# Patient Record
Sex: FEMALE | Race: WHITE | HISPANIC OR LATINO | Employment: FULL TIME | URBAN - METROPOLITAN AREA
[De-identification: names, ages, dates, MRNs, and addresses within clinical notes are randomized per-mention and may not be internally consistent; named-entity substitution may affect disease eponyms.]

---

## 2017-06-08 ENCOUNTER — OFFICE VISIT (OUTPATIENT)
Dept: FAMILY MEDICINE | Facility: CLINIC | Age: 27
End: 2017-06-08
Payer: COMMERCIAL

## 2017-06-08 VITALS
HEART RATE: 106 BPM | WEIGHT: 191 LBS | DIASTOLIC BLOOD PRESSURE: 78 MMHG | SYSTOLIC BLOOD PRESSURE: 116 MMHG | HEIGHT: 64 IN | OXYGEN SATURATION: 98 % | BODY MASS INDEX: 32.61 KG/M2

## 2017-06-08 DIAGNOSIS — F41.8 MIXED ANXIETY DEPRESSIVE DISORDER: ICD-10-CM

## 2017-06-08 DIAGNOSIS — Z00.00 PHYSICAL EXAM, ANNUAL: Primary | ICD-10-CM

## 2017-06-08 PROCEDURE — 99385 PREV VISIT NEW AGE 18-39: CPT | Mod: ,,, | Performed by: FAMILY MEDICINE

## 2017-06-08 RX ORDER — AMITRIPTYLINE HYDROCHLORIDE 10 MG/1
1 TABLET, FILM COATED ORAL NIGHTLY
COMMUNITY
Start: 2017-05-16 | End: 2018-08-29 | Stop reason: DRUGHIGH

## 2017-06-08 NOTE — PROGRESS NOTES
SUBJECTIVE:   HPI: Alea Barrett  is a 27 y.o. female who presents for annual physical .   Establish Care    HPI  Review of Systems   Constitutional: Negative for activity change, appetite change, chills and diaphoresis.   HENT: Negative for congestion, ear discharge and hearing loss.    Eyes: Negative for pain, discharge and itching.   Respiratory: Negative for apnea, cough, chest tightness and shortness of breath.    Cardiovascular: Negative for chest pain, palpitations and leg swelling.   Endocrine: Negative for cold intolerance, heat intolerance and polydipsia.   Genitourinary: Negative for dysuria.   Musculoskeletal: Negative for arthralgias, gait problem and joint swelling.   Skin: Negative for color change.   Neurological: Negative for dizziness and headaches.   Psychiatric/Behavioral: Negative for agitation.        (Not in a hospital admission)  Review of patient's allergies indicates:   Allergen Reactions    No known drug allergies      Past Medical History:   Diagnosis Date    Anxiety     Depression      Past Surgical History:   Procedure Laterality Date    ear tube      bilateral     Family History   Problem Relation Age of Onset    Hypertension Mother     Arthritis Mother     Hypertension Father     Arthritis Father     Hypertension Maternal Grandmother     Arthritis Maternal Grandmother     Hypertension Paternal Grandmother     Diabetes Paternal Grandmother     Arthritis Paternal Grandmother     Hyperlipidemia Paternal Grandmother     Heart disease Maternal Grandfather     Hypertension Maternal Grandfather     Cancer Paternal Grandfather      Social History   Substance Use Topics    Smoking status: Never Smoker    Smokeless tobacco: Never Used    Alcohol use No      Health Maintenance Topics with due status: Not Due       Topic Last Completion Date    TETANUS VACCINE 07/20/2009    Eye Exam 05/29/2017    Pap Smear 05/30/2017    Influenza Vaccine Not Due       There is no  immunization history on file for this patient.  OBJECTIVE:      Vitals:    06/08/17 1426   BP: 116/78   Pulse: 106     Physical Exam   Constitutional: She is oriented to person, place, and time. She appears well-developed and well-nourished.   HENT:   Head: Normocephalic and atraumatic.   Right Ear: External ear normal.   Left Ear: External ear normal.   Nose: Nose normal.   Mouth/Throat: Oropharynx is clear and moist.   Eyes: EOM are normal. Pupils are equal, round, and reactive to light. Right eye exhibits no discharge. Left eye exhibits no discharge.   Neck: Normal range of motion. Neck supple.   Cardiovascular: Normal rate, regular rhythm, normal heart sounds and intact distal pulses.    No murmur heard.  Pulmonary/Chest: Effort normal and breath sounds normal. No respiratory distress. She has no wheezes.   Abdominal: Soft. Bowel sounds are normal. She exhibits no distension. There is no tenderness. There is no rebound and no guarding.   Musculoskeletal: Normal range of motion. She exhibits no edema, tenderness or deformity.   Lymphadenopathy:     She has no cervical adenopathy.   Neurological: She is alert and oriented to person, place, and time. No cranial nerve deficit. Coordination normal.   Skin: Skin is warm and dry. No rash noted. No erythema.   Psychiatric: She has a normal mood and affect.      Assessment:       1. Physical exam, annual    2. Mixed anxiety depressive disorder        Plan:       Physical exam, annual  -     CBC auto differential; Future; Expected date: 06/08/2017  -     Comprehensive metabolic panel; Future; Expected date: 06/08/2017  -     Lipid panel; Future; Expected date: 06/08/2017  -     TSH; Future    Mixed anxiety depressive disorder  Comments:  on TCA since 2016  Orders:  -     CBC auto differential; Future; Expected date: 06/08/2017  -     Comprehensive metabolic panel; Future; Expected date: 06/08/2017  -     Lipid panel; Future; Expected date: 06/08/2017  -     TSH;  Future     Patient was educated on STDs and preventive measures.  Patient was educated on effects and addictive nature of drugs and alcohol.  Carcinogenic potential of tobacco and tanning beds were discussed.     Counseled on age appropriate medical preventative services, including age appropriate cancer screenings, over all nutritional health, need for a consistent exercise regimen and an over all push towards maintaining a vigorous and active lifestyle.    .xist  Counseled on age appropriate vaccines and discussed upcoming health care needs based on age/gender.  Return in about 3 months (around 9/8/2017) for lab review .      6/8/2017 Tamia Goodrich M.D.

## 2017-06-08 NOTE — PATIENT INSTRUCTIONS
Diet and Lifestyle Tips for Irritable Bowel Syndrome (IBS)    Your healthcare professional may suggest some lifestyle changes to help control your IBS. Changing your diet and managing stress are two of the most important. Follow your healthcare providers instructions and try some of the suggestions below.  Change your diet  Your diet may be an important cause of IBS symptoms. You may want to try the following:  · Pay attention to what foods bother you, and avoid them. For example, dairy products are hard for some people to digest.  · Drink 6 to 8 glasses of water a day.  · Avoid caffeine and tobacco. These are muscle stimulants and can affect the working of your digestive tract.  · Avoid alcohol, which can irritate your digestive tract and make your symptoms worse.  · Eat more fiber if constipation is a problem. Fiber makes the stool softer and easier to pass through the colon.  Reduce stress  If stress or anxiety makes your IBS symptoms worse, learning how to manage stress may help you feel better. Try these tips:  · Identify the causes of stress in your life.  · Learn new ways to cope with them.  · Regular exercise is a great way to relieve stress. It can also help ease constipation.  Date Last Reviewed: 7/1/2016  © 1569-3891 [employer]. [address] [place] [address], [place] [address]. All rights reserved. This information is not intended as a substitute for professional medical care. Always follow your healthcare professional's instructions.        Constipation (Adult)  Constipation means that you have bowel movements that are less frequent than usual. Stools often become very hard and difficult to pass.  Constipation is very common. At some point in life it affects almost everyone. Since everyone's bowel habits are different, what is constipation to one person may not be to another. Your healthcare provider may do tests to diagnose constipation. It depends on what he or she finds when evaluating  you.    Symptoms of constipation include:  · Abdominal pain  · Bloating  · Vomiting  · Painful bowel movements  · Itching, swelling, bleeding, or pain around the anus  Causes  Constipation can have many causes. These include:  · Diet low in fiber  · Too much dairy  · Not drinking enough liquids  · Lack of exercise or physical activity. This is especially true for older adults.  · Changes in lifestyle or daily routine, including pregnancy, aging, work, and travel  · Frequent use or misuse of laxatives  · Ignoring the urge to have a bowel movement or delaying it until later  · Medicines, such as certain prescription pain medicines, iron supplements, antacids, certain antidepressants, and calcium supplements  · Diseases like irritable bowel syndrome, bowel obstructions, stroke, diabetes, thyroid disease, Parkinson disease, hemorrhoids, and colon cancer  Complications  Potential complications of constipation can include:  · Hemorrhoids  · Rectal bleeding from hemorrhoids or anal fissures (skin tears)  · Hernias  · Dependency on laxatives  · Chronic constipation  · Fecal impaction  · Bowel obstruction or perforation  Home care  All treatment should be done after talking with your healthcare provider. This is especially true if you have another medical problems, are taking prescription medicines, or are an older adult. Treatment most often involves lifestyle changes. You may also need medicines. Your healthcare provider will tell you which will work best for you. Follow the advice below to help avoid this problem in the future.  Lifestyle changes  These lifestyle changes can help prevent constipation:  · Diet. Eat a high-fiber diet, with fresh fruit and vegetables, and reduce dairy intake, meats, and processed foods  · Fluids. It's important to get enough fluids each day. Drink plenty of water when you eat more fiber. If you are on diet that limits the amount of fluid you can have, talk about this with your healthcare  provider.  · Regular exercise. Check with your healthcare provider first.  Medications  Take any medicines as directed. Some laxatives are safe to use only every now and then. Others can be taken on a regular basis. Talk with your doctor or pharmacist if you have questions.  Prescription pain medicines can cause constipation. If you are taking this kind of medicine, ask your healthcare provider if you should also take a stool softener.  Medicines you may take to treat constipation include:  · Fiber supplements  · Stool softeners  · Laxatives  · Enemas  · Rectal suppositories  Follow-up care  Follow up with your healthcare provider if symptoms don't get better in the next few days. You may need to have more tests or see a specialist.  Call 911  Call 911 if any of these occur:  · Trouble breathing  · Stiff, rigid abdomen that is severely painful to touch  · Confusion  · Fainting or loss of consciousness  · Rapid heart rate  · Chest pain  When to seek medical advice  Call your healthcare provider right away if any of these occur:  · Fever over 100.4°F (38°C)  · Failure to resume normal bowel movements  · Pain in your abdomen or back gets worse  · Nausea or vomiting  · Swelling in your abdomen  · Blood in the stool  · Black, tarry stool  · Involuntary weight loss  · Weakness  Date Last Reviewed: 12/30/2015  © 4516-1647 [employer]. [address] [place] [address], [place] [address]. All rights reserved. This information is not intended as a substitute for professional medical care. Always follow your healthcare professional's instructions.

## 2018-08-29 ENCOUNTER — OFFICE VISIT (OUTPATIENT)
Dept: FAMILY MEDICINE | Facility: CLINIC | Age: 28
End: 2018-08-29
Payer: COMMERCIAL

## 2018-08-29 VITALS
HEIGHT: 64 IN | WEIGHT: 171.5 LBS | DIASTOLIC BLOOD PRESSURE: 66 MMHG | RESPIRATION RATE: 16 BRPM | BODY MASS INDEX: 29.28 KG/M2 | SYSTOLIC BLOOD PRESSURE: 118 MMHG | OXYGEN SATURATION: 99 % | HEART RATE: 102 BPM

## 2018-08-29 DIAGNOSIS — G43.809 OTHER MIGRAINE WITHOUT STATUS MIGRAINOSUS, NOT INTRACTABLE: ICD-10-CM

## 2018-08-29 DIAGNOSIS — G47.09 OTHER INSOMNIA: ICD-10-CM

## 2018-08-29 DIAGNOSIS — F32.89 OTHER DEPRESSION: Primary | ICD-10-CM

## 2018-08-29 DIAGNOSIS — R53.83 OTHER FATIGUE: ICD-10-CM

## 2018-08-29 PROCEDURE — 99999 PR PBB SHADOW E&M-NEW PATIENT-LVL IV: CPT | Mod: PBBFAC,,, | Performed by: FAMILY MEDICINE

## 2018-08-29 PROCEDURE — 99203 OFFICE O/P NEW LOW 30 MIN: CPT | Mod: S$GLB,,, | Performed by: FAMILY MEDICINE

## 2018-08-29 PROCEDURE — 3008F BODY MASS INDEX DOCD: CPT | Mod: CPTII,S$GLB,, | Performed by: FAMILY MEDICINE

## 2018-08-29 RX ORDER — AMITRIPTYLINE HYDROCHLORIDE 25 MG/1
25 TABLET, FILM COATED ORAL NIGHTLY
Qty: 90 TABLET | Refills: 1 | Status: SHIPPED | OUTPATIENT
Start: 2018-08-29 | End: 2019-02-27 | Stop reason: SDUPTHER

## 2018-08-29 NOTE — PATIENT INSTRUCTIONS
Progressive Relaxation  Your body needs relaxation to reduce stress and undo the fight-or-flight response. It helps to plan for 20 minutes of relaxation every day. This is time for yourself. Sit or lie comfortably. Limit distractions like phones. Listen to soft music or just sit in silence. And try the relaxation technique below.    How to do progressive relaxation  Progressive relaxation helps your whole body relax. To try this technique, follow these steps:  1. Find a quiet room. Sit in a comfortable chair or lie on your back.  2. Breathe in deeply to a slow count of 5. Feel your belly, chest, and back expand. Breathe out slowly to a count of 5.  3. After a few minutes, breathe in deeply. Tighten the muscles in your feet. Notice how it feels. Hold the tension for 3 seconds.  4. Breathe out while relaxing the tightened muscles. Notice how relaxed you feel.  5. Repeat steps 3 and 4 with another muscle group. You can move from your feet, calves, and thighs to your stomach, arms, and hands.  Remember the 4 As  · Avoid a stressor. If someone is smoking when youre trying to quit, leave the room.  · Accept a stressor you cant change, like a job loss, by knowing that your feelings are normal.  · Alter how you deal with a stressor. If a constantly ringing phone is a stressor, let the answering machine .  · Adapt to some stressors. When starting a new exercise program, instead of focusing on how hard it will be, think how good you will feel.  Date Last Reviewed: 2/25/2016  © 6991-4506 [employer]. [address] [place] [address], [place] [address]. All rights reserved. This information is not intended as a substitute for professional medical care. Always follow your healthcare professional's instructions.        Treating Anxiety Disorders with Medicine  An anxiety disorder can make you feel nervous or apprehensive, even without a clear reason. In people age 65 and older, generalized anxiety disorder is one  of the most commonly diagnosed anxiety disorders. Many times it occurs with depression. Certain anxiety disorders can cause intense feelings of fear or panic. You may even have physical symptoms such as a racing heartbeat, sweating, or dizziness. If you have these feelings, you dont have to suffer anymore. Treatment to help you overcome your fears will likely include therapy (also called counseling). Medicine may also be prescribed to help control your symptoms.    Medicines  Certain medicines may be prescribed to help control your symptoms. So you may feel less anxious. You may also feel able to move forward with therapy. At first, medicines and dosages may need to be adjusted to find what works best for you. Try to be patient. Tell your healthcare provider how a medicine makes you feel. This way, you can work together to find the treatment thats best for you. Keep in mind that medicines can have side effects. Talk with your provider about any side effects that are bothering you. Changing the dose or type of medicine may help. Dont stop taking medicine on your own. That can cause symptoms to come back.  · Anti-anxiety medicine. This medicine eases symptoms and helps you relax. Your healthcare provider will explain when and how to use it. It may be prescribed for use before situations that make you anxious. You may also be told to take medicine on a regular schedule. Anti-anxiety medicine may make you feel a little sleepy or out of it. Dont drive a car or operate machinery while on this medicine, until you know how it affects you.  Caution  Never use alcohol or other drugs with anti-anxiety medicines. This could result in loss of muscular control, sedation, coma, or death. Also, use only the amount of medicine prescribed for you. If you think you may have taken too much, get emergency care right away.   · Antidepressant medicine. This kind of medicine is often used to treat anxiety, even if you arent  depressed. An antidepressant helps balance out brain chemicals. This helps keep anxiety under control. This medicine is taken on a schedule. It takes a few weeks to start working. If you dont notice a change at first, you may just need more time. But if you dont notice results after the first few weeks, tell your provider.  Keep taking medicines as prescribed  Never change your dosage, share or use another person's medicine, or stop taking your medicines without talking to your healthcare provider first. Keep the following in mind:  · Some medicines must be taken on a schedule. Make this part of your daily routine. For instance, always take your pill before brushing your teeth. A pillbox can help you remember if youve taken your medicine each day.  · Medicines are often taken for 6 to 12 months. Your healthcare provider will then evaluate whether you need to stay on them. Many people who have also had therapy may no longer need medicine to manage anxiety.  · You may need to stop taking medicine slowly to give your body time to adjust. When its time to stop, your healthcare provider will tell you more. Remember: Never stop taking your medicine without talking to your provider first.  · If symptoms return, you may need to start taking medicines again. This isnt your fault. Its just the nature of your anxiety disorder.  Special concerns  · Side effects. Medicines may cause side effects. Ask your healthcare provider or pharmacist what you can expect. They may have ideas for avoiding some side effects.  · Sexual problems. Some antidepressants can affect your desire for sex or your ability to have an orgasm. A change in dosage or medicine often solves the problem. If you have a sexual side effect that concerns you, tell your healthcare provider.  · Addiction. If youve never had a problem with drugs or alcohol, you may not have a problem with medicines used to treat anxiety disorders. But always discuss the  medicines with your healthcare provider before taking them. If you have a history of addiction, you may not be able to use certain medicines used to treat anxiety disorders.  · Medicine interactions. Always check with your pharmacist before using any over-the-counter medicines, including herbal supplements.   Date Last Reviewed: 5/1/2017 © 2000-2017 [employer]. [address] [place] [address], [place] [address]. All rights reserved. This information is not intended as a substitute for professional medical care. Always follow your healthcare professional's instructions.        Depression Affects Your Mind and Body  Everyone feels sad or blue from time to time for a few days or weeks. Depression is when these feelings don't go away and they interfere with daily life.  Depression is a real illness that can develop at any age. It is one of the most common mental health problems in the [place] Depression makes you feel sad, helpless, and hopeless. It gets in the way of your life and relationships. It inhibits your ability to think and act. But, with help, you can feel better again.     When I was depressed, I felt awful. I was so tired all the time I could hardly think, but at night I couldnt fall asleep. My head hurt. My stomach hurt. I didnt know what was wrong with me.   Depression affects your whole body  Brain chemicals affect your body as well as your mood. So depression may do more than just make you feel low. You may also feel bad physically. Depression can:  · Cause trouble with mental tasks such as remembering, concentrating, or making decisions  · Make you feel nervous and jumpy  · Cause trouble sleeping. Or you may sleep too much  · Change your appetite  · Cause headaches, stomachaches, or other aches and pains  · Drain your body of energy  Depression and other illness  It is common for people who have chronic health problems to also have depression. It can often be hard to tell which one caused the  other. A person might become depressed after finding out they have a health problem. But some studies suggest being depressed may make certain health problems more likely. And some depressed people stop taking care of themselves. This may make them more likely to get sick.  Date Last Reviewed: 1/1/2017  © 4668-2719 [employer]. [address] [place] [address], [place] [address]. All rights reserved. This information is not intended as a substitute for professional medical care. Always follow your healthcare professional's instructions.
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
## 2019-02-27 RX ORDER — AMITRIPTYLINE HYDROCHLORIDE 25 MG/1
25 TABLET, FILM COATED ORAL NIGHTLY
Qty: 90 TABLET | Refills: 1 | Status: SHIPPED | OUTPATIENT
Start: 2019-02-27 | End: 2019-08-25 | Stop reason: SDUPTHER

## 2019-08-25 RX ORDER — AMITRIPTYLINE HYDROCHLORIDE 25 MG/1
25 TABLET, FILM COATED ORAL NIGHTLY
Qty: 30 TABLET | Refills: 5 | Status: SHIPPED | OUTPATIENT
Start: 2019-08-25 | End: 2019-11-04

## 2019-10-28 LAB — PAP SMEAR: NORMAL

## 2019-11-04 ENCOUNTER — LAB VISIT (OUTPATIENT)
Dept: LAB | Facility: HOSPITAL | Age: 29
End: 2019-11-04
Attending: FAMILY MEDICINE
Payer: COMMERCIAL

## 2019-11-04 ENCOUNTER — OFFICE VISIT (OUTPATIENT)
Dept: FAMILY MEDICINE | Facility: CLINIC | Age: 29
End: 2019-11-04
Payer: COMMERCIAL

## 2019-11-04 DIAGNOSIS — F41.9 ANXIETY: ICD-10-CM

## 2019-11-04 DIAGNOSIS — Z13.6 SCREENING FOR CARDIOVASCULAR CONDITION: ICD-10-CM

## 2019-11-04 DIAGNOSIS — R53.83 OTHER FATIGUE: Primary | ICD-10-CM

## 2019-11-04 DIAGNOSIS — R53.83 OTHER FATIGUE: ICD-10-CM

## 2019-11-04 LAB
ALBUMIN SERPL BCP-MCNC: 3.7 G/DL (ref 3.5–5.2)
ALP SERPL-CCNC: 62 U/L (ref 55–135)
ALT SERPL W/O P-5'-P-CCNC: 17 U/L (ref 10–44)
ANION GAP SERPL CALC-SCNC: 6 MMOL/L (ref 8–16)
AST SERPL-CCNC: 18 U/L (ref 10–40)
BASOPHILS # BLD AUTO: 0.04 K/UL (ref 0–0.2)
BASOPHILS NFR BLD: 0.4 % (ref 0–1.9)
BILIRUB SERPL-MCNC: 0.2 MG/DL (ref 0.1–1)
BUN SERPL-MCNC: 11 MG/DL (ref 6–20)
CALCIUM SERPL-MCNC: 9 MG/DL (ref 8.7–10.5)
CHLORIDE SERPL-SCNC: 107 MMOL/L (ref 95–110)
CHOLEST SERPL-MCNC: 186 MG/DL (ref 120–199)
CHOLEST/HDLC SERPL: 2.5 {RATIO} (ref 2–5)
CO2 SERPL-SCNC: 25 MMOL/L (ref 23–29)
CREAT SERPL-MCNC: 0.8 MG/DL (ref 0.5–1.4)
DIFFERENTIAL METHOD: ABNORMAL
EOSINOPHIL # BLD AUTO: 0.1 K/UL (ref 0–0.5)
EOSINOPHIL NFR BLD: 1.2 % (ref 0–8)
ERYTHROCYTE [DISTWIDTH] IN BLOOD BY AUTOMATED COUNT: 12.5 % (ref 11.5–14.5)
EST. GFR  (AFRICAN AMERICAN): >60 ML/MIN/1.73 M^2
EST. GFR  (NON AFRICAN AMERICAN): >60 ML/MIN/1.73 M^2
GLUCOSE SERPL-MCNC: 86 MG/DL (ref 70–110)
HCT VFR BLD AUTO: 43.1 % (ref 37–48.5)
HDLC SERPL-MCNC: 74 MG/DL (ref 40–75)
HDLC SERPL: 39.8 % (ref 20–50)
HGB BLD-MCNC: 13.2 G/DL (ref 12–16)
IMM GRANULOCYTES # BLD AUTO: 0.04 K/UL (ref 0–0.04)
IMM GRANULOCYTES NFR BLD AUTO: 0.4 % (ref 0–0.5)
LDLC SERPL CALC-MCNC: 100 MG/DL (ref 63–159)
LYMPHOCYTES # BLD AUTO: 2.5 K/UL (ref 1–4.8)
LYMPHOCYTES NFR BLD: 28.3 % (ref 18–48)
MCH RBC QN AUTO: 28.5 PG (ref 27–31)
MCHC RBC AUTO-ENTMCNC: 30.6 G/DL (ref 32–36)
MCV RBC AUTO: 93 FL (ref 82–98)
MONOCYTES # BLD AUTO: 0.5 K/UL (ref 0.3–1)
MONOCYTES NFR BLD: 5.1 % (ref 4–15)
NEUTROPHILS # BLD AUTO: 5.8 K/UL (ref 1.8–7.7)
NEUTROPHILS NFR BLD: 64.6 % (ref 38–73)
NONHDLC SERPL-MCNC: 112 MG/DL
NRBC BLD-RTO: 0 /100 WBC
PLATELET # BLD AUTO: 288 K/UL (ref 150–350)
PMV BLD AUTO: 10.5 FL (ref 9.2–12.9)
POTASSIUM SERPL-SCNC: 4.1 MMOL/L (ref 3.5–5.1)
PROT SERPL-MCNC: 7.1 G/DL (ref 6–8.4)
RBC # BLD AUTO: 4.63 M/UL (ref 4–5.4)
SODIUM SERPL-SCNC: 138 MMOL/L (ref 136–145)
TRIGL SERPL-MCNC: 60 MG/DL (ref 30–150)
TSH SERPL DL<=0.005 MIU/L-ACNC: 0.58 UIU/ML (ref 0.4–4)
WBC # BLD AUTO: 8.97 K/UL (ref 3.9–12.7)

## 2019-11-04 PROCEDURE — 99213 OFFICE O/P EST LOW 20 MIN: CPT | Mod: S$GLB,,, | Performed by: FAMILY MEDICINE

## 2019-11-04 PROCEDURE — 84443 ASSAY THYROID STIM HORMONE: CPT

## 2019-11-04 PROCEDURE — 36415 COLL VENOUS BLD VENIPUNCTURE: CPT | Mod: PO

## 2019-11-04 PROCEDURE — 85025 COMPLETE CBC W/AUTO DIFF WBC: CPT

## 2019-11-04 PROCEDURE — 99213 PR OFFICE/OUTPT VISIT, EST, LEVL III, 20-29 MIN: ICD-10-PCS | Mod: S$GLB,,, | Performed by: FAMILY MEDICINE

## 2019-11-04 PROCEDURE — 80053 COMPREHEN METABOLIC PANEL: CPT

## 2019-11-04 PROCEDURE — 99999 PR PBB SHADOW E&M-EST. PATIENT-LVL III: ICD-10-PCS | Mod: PBBFAC,,, | Performed by: FAMILY MEDICINE

## 2019-11-04 PROCEDURE — 3008F PR BODY MASS INDEX (BMI) DOCUMENTED: ICD-10-PCS | Mod: CPTII,S$GLB,, | Performed by: FAMILY MEDICINE

## 2019-11-04 PROCEDURE — 80061 LIPID PANEL: CPT

## 2019-11-04 PROCEDURE — 99999 PR PBB SHADOW E&M-EST. PATIENT-LVL III: CPT | Mod: PBBFAC,,, | Performed by: FAMILY MEDICINE

## 2019-11-04 PROCEDURE — 3008F BODY MASS INDEX DOCD: CPT | Mod: CPTII,S$GLB,, | Performed by: FAMILY MEDICINE

## 2019-11-04 RX ORDER — NORETHINDRONE ACETATE AND ETHINYL ESTRADIOL .02; 1 MG/1; MG/1
1 TABLET ORAL DAILY
Refills: 6 | COMMUNITY
Start: 2019-10-31 | End: 2021-09-27

## 2019-11-04 RX ORDER — AMITRIPTYLINE HYDROCHLORIDE 10 MG/1
10 TABLET, FILM COATED ORAL NIGHTLY
Qty: 30 TABLET | Refills: 2 | Status: SHIPPED | OUTPATIENT
Start: 2019-11-04 | End: 2020-03-06

## 2019-11-04 NOTE — PROGRESS NOTES
"Subjective:       Patient ID: Alea Barrett is a 29 y.o. female.    Chief Complaint: Medication Refill (wants to "get off" anxiety medication, )    HPI     The patient is coming here today for a follow-up visit, has anxiety, but stated the symptoms are improved, she wants to be of of amitriptyline.  She is coming here today to get off of the medication.  She complains of worsening fatigue, she has been losing weight and eating healthier.  She denies any symptoms of chest pain, shortness of breath, nausea, vomiting, abdominal pain, diarrhea or constipation at this office visit.  She is up-to-date with Pap smears.  She declined influenza immunization.    Past medical history, past social history was reviewed and discussed with the patient.    Review of Systems   Constitutional: Positive for fatigue. Negative for activity change and unexpected weight change.   HENT: Negative for hearing loss, rhinorrhea and trouble swallowing.    Eyes: Negative for discharge and visual disturbance.   Respiratory: Negative for chest tightness and wheezing.    Cardiovascular: Negative for chest pain and palpitations.   Gastrointestinal: Negative for blood in stool, constipation, diarrhea and vomiting.   Endocrine: Negative for polydipsia and polyuria.   Genitourinary: Negative for difficulty urinating, dysuria, hematuria and menstrual problem.   Musculoskeletal: Negative for arthralgias, joint swelling and neck pain.   Skin: Negative for color change and pallor.   Neurological: Negative for weakness and headaches.   Psychiatric/Behavioral: Negative for confusion, dysphoric mood and sleep disturbance. The patient is nervous/anxious.        Objective:      Physical Exam   Constitutional: She appears well-developed and well-nourished. No distress.   HENT:   Head: Normocephalic and atraumatic.   Right Ear: External ear normal.   Left Ear: External ear normal.   Mouth/Throat: Oropharynx is clear and moist. No oropharyngeal exudate.   Eyes: " Pupils are equal, round, and reactive to light. Conjunctivae are normal. Right eye exhibits no discharge. Left eye exhibits no discharge.   Neck: No tracheal deviation present. No thyromegaly present.   Cardiovascular: Normal rate, regular rhythm and normal heart sounds.   No murmur heard.  Pulmonary/Chest: Effort normal and breath sounds normal. No respiratory distress. She has no wheezes.   Musculoskeletal: She exhibits no tenderness or deformity.   Neurological: No cranial nerve deficit. Coordination normal.   Skin: No rash noted. She is not diaphoretic. No erythema. No pallor.   Psychiatric: She has a normal mood and affect. Her behavior is normal. Judgment and thought content normal.   Nursing note and vitals reviewed.      Assessment:       1. Other fatigue    2. Screening for cardiovascular condition    3. Anxiety        Plan:       Other fatigue:  New problem, workup needed  -     CBC auto differential; Future; Expected date: 11/04/2019  -     Comprehensive metabolic panel; Future; Expected date: 11/04/2019  -     TSH; Future; Expected date: 11/04/2019    Screening for cardiovascular condition  -     Lipid panel; Future; Expected date: 11/04/2019    Anxiety:  Improved  -     amitriptyline (ELAVIL) 10 MG tablet; Take 1 tablet (10 mg total) by mouth every evening.  Dispense: 30 tablet; Refill: 2    Will wean the patient off of amitriptyline, will start patient on 10 mg at bedtime instead of 25 mg.  The patient was advised to take the medication for 4 weeks and then start taking it every other night.  The patient will contact us if she start develops any worsening symptoms of anxiety or depression.  Will call the patient after we have the results of the test.Patient agreed with assessment and plan. Patient verbalized understanding.  The patient declined influenza immunization.

## 2019-11-04 NOTE — PATIENT INSTRUCTIONS
Eating Heart-Healthy Food: Using the DASH Plan    Eating for your heart doesnt have to be hard or boring. You just need to know how to make healthier choices. The DASH eating plan has been developed to help you do just that. DASH stands for Dietary Approaches to Stop Hypertension. It is a plan that has been proven to be healthier for your heart and to lower your risk for high blood pressure. It can also help lower your risk for cancer, heart disease, osteoporosis, and diabetes.  Choosing from each food group  Choose foods from each of the food groups below each day. Try to get the recommended number of servings for each food group. The serving numbers are based on a diet of 2,000 calories a day. Talk to your doctor if youre unsure about your calorie needs. Along with getting the correct servings, the DASH plan also recommends a sodium intake less than 2,300 mg per day.        Grains  Servings: 6 to 8 a day  A serving is:  · 1 slice bread  · 1 ounce dry cereal  · Half a cup cooked rice, pasta or cereal  Best choices: Whole grains and any grains high in fiber. Vegetables  Servings: 4 to 5 a day  A serving is:  · 1 cup raw leafy vegetable  · Half a cup cut-up raw or cooked vegetable  · Half a cup vegetable juice  Best choices: Fresh or frozen vegetables prepared without added salt or fat.   Fruits  Servings: 4 to 5 a day  A serving is:  · 1 medium fruit  · One-quarter cup dried fruit  · Half a cup fresh, frozen, or canned fruit  · Half a cup of 100% fruit juices  Best choices: A variety of fresh fruits of different colors. Whole fruits are a better choice than fruit juices. Low-fat or fat-free dairy  Servings: 2 to 3 a day  A serving is:  · 1 cup milk  · 1 cup yogurt  · One and a half ounces cheese  Best choices: Skim or 1% milk, low-fat or fat-free yogurt or buttermilk, and low-fat cheeses.         Lean meats, poultry, fish  Servings: 6 or fewer a day  A serving is:  · 1 ounce cooked meats, poultry, or fish  · 1  egg  Best choices: Lean poultry and fish. Trim away visible fat. Broil, grill, roast, or boil instead of frying. Remove skin from poultry before eating. Limit how much red meat you eat.  Nuts, seeds, beans  Servings: 4 to 5 a week  A serving is:  · One-third cup nuts (one and a half ounces)  · 2 tablespoons nut butter or seeds  · Half a cup cooked dry beans or legumes  Best choices: Dry roasted nuts with no salt added, lentils, kidney beans, garbanzo beans, and whole worley beans.   Fats and oils  Servings: 2 to 3 a day  A serving is:  · 1 teaspoon vegetable oil  · 1 teaspoon soft margarine  · 1 tablespoon mayonnaise  · 2 tablespoons salad dressing  Best choices: Nut and vegetable oils (nontropical vegetable oils), such as olive and canola oil. Sweets  Servings: 5 a week or fewer  A serving is:  · 1 tablespoon sugar, maple syrup, or honey  · 1 tablespoon jam or jelly  · 1 half-ounce jelly beans (about 15)  · 1 cup lemonade  Best choices: Dried fruit can be a satisfying sweet. Choose low-fat sweets. And watch your serving sizes!      For more on the DASH eating plan, visit:  www.nhlbi.nih.gov/health/health-topics/topics/dash   Date Last Reviewed: 6/1/2016  © 1649-4097 [employer] [address] [place] [address], [place] [address]. All rights reserved. This information is not intended as a substitute for professional medical care. Always follow your healthcare professional's instructions.

## 2019-11-05 VITALS
OXYGEN SATURATION: 98 % | BODY MASS INDEX: 30.34 KG/M2 | TEMPERATURE: 98 F | HEART RATE: 101 BPM | WEIGHT: 177.69 LBS | HEIGHT: 64 IN | SYSTOLIC BLOOD PRESSURE: 122 MMHG | DIASTOLIC BLOOD PRESSURE: 86 MMHG

## 2020-02-06 ENCOUNTER — LAB VISIT (OUTPATIENT)
Dept: LAB | Facility: HOSPITAL | Age: 30
End: 2020-02-06
Attending: FAMILY MEDICINE
Payer: COMMERCIAL

## 2020-02-06 ENCOUNTER — OFFICE VISIT (OUTPATIENT)
Dept: FAMILY MEDICINE | Facility: CLINIC | Age: 30
End: 2020-02-06
Payer: COMMERCIAL

## 2020-02-06 VITALS
WEIGHT: 156.31 LBS | BODY MASS INDEX: 26.69 KG/M2 | HEART RATE: 102 BPM | SYSTOLIC BLOOD PRESSURE: 120 MMHG | RESPIRATION RATE: 19 BRPM | DIASTOLIC BLOOD PRESSURE: 78 MMHG | HEIGHT: 64 IN | TEMPERATURE: 98 F | OXYGEN SATURATION: 98 %

## 2020-02-06 DIAGNOSIS — R00.0 TACHYCARDIA: Primary | ICD-10-CM

## 2020-02-06 DIAGNOSIS — R00.0 TACHYCARDIA: ICD-10-CM

## 2020-02-06 LAB
BASOPHILS # BLD AUTO: 0.06 K/UL (ref 0–0.2)
BASOPHILS NFR BLD: 0.6 % (ref 0–1.9)
DIFFERENTIAL METHOD: NORMAL
EOSINOPHIL # BLD AUTO: 0.1 K/UL (ref 0–0.5)
EOSINOPHIL NFR BLD: 0.7 % (ref 0–8)
ERYTHROCYTE [DISTWIDTH] IN BLOOD BY AUTOMATED COUNT: 12.1 % (ref 11.5–14.5)
HCT VFR BLD AUTO: 43.5 % (ref 37–48.5)
HGB BLD-MCNC: 13.9 G/DL (ref 12–16)
IMM GRANULOCYTES # BLD AUTO: 0.02 K/UL (ref 0–0.04)
IMM GRANULOCYTES NFR BLD AUTO: 0.2 % (ref 0–0.5)
LYMPHOCYTES # BLD AUTO: 3.3 K/UL (ref 1–4.8)
LYMPHOCYTES NFR BLD: 33.6 % (ref 18–48)
MCH RBC QN AUTO: 28.8 PG (ref 27–31)
MCHC RBC AUTO-ENTMCNC: 32 G/DL (ref 32–36)
MCV RBC AUTO: 90 FL (ref 82–98)
MONOCYTES # BLD AUTO: 0.7 K/UL (ref 0.3–1)
MONOCYTES NFR BLD: 6.7 % (ref 4–15)
NEUTROPHILS # BLD AUTO: 5.6 K/UL (ref 1.8–7.7)
NEUTROPHILS NFR BLD: 58.2 % (ref 38–73)
NRBC BLD-RTO: 0 /100 WBC
PLATELET # BLD AUTO: 263 K/UL (ref 150–350)
PMV BLD AUTO: 10.9 FL (ref 9.2–12.9)
RBC # BLD AUTO: 4.82 M/UL (ref 4–5.4)
T4 FREE SERPL-MCNC: 0.89 NG/DL (ref 0.71–1.51)
WBC # BLD AUTO: 9.7 K/UL (ref 3.9–12.7)

## 2020-02-06 PROCEDURE — 84481 FREE ASSAY (FT-3): CPT

## 2020-02-06 PROCEDURE — 85025 COMPLETE CBC W/AUTO DIFF WBC: CPT

## 2020-02-06 PROCEDURE — 3008F PR BODY MASS INDEX (BMI) DOCUMENTED: ICD-10-PCS | Mod: CPTII,S$GLB,, | Performed by: FAMILY MEDICINE

## 2020-02-06 PROCEDURE — 82306 VITAMIN D 25 HYDROXY: CPT

## 2020-02-06 PROCEDURE — 99214 PR OFFICE/OUTPT VISIT, EST, LEVL IV, 30-39 MIN: ICD-10-PCS | Mod: S$GLB,,, | Performed by: FAMILY MEDICINE

## 2020-02-06 PROCEDURE — 3008F BODY MASS INDEX DOCD: CPT | Mod: CPTII,S$GLB,, | Performed by: FAMILY MEDICINE

## 2020-02-06 PROCEDURE — 80048 BASIC METABOLIC PNL TOTAL CA: CPT

## 2020-02-06 PROCEDURE — 36415 COLL VENOUS BLD VENIPUNCTURE: CPT | Mod: PO

## 2020-02-06 PROCEDURE — 99999 PR PBB SHADOW E&M-EST. PATIENT-LVL III: ICD-10-PCS | Mod: PBBFAC,,, | Performed by: FAMILY MEDICINE

## 2020-02-06 PROCEDURE — 99214 OFFICE O/P EST MOD 30 MIN: CPT | Mod: S$GLB,,, | Performed by: FAMILY MEDICINE

## 2020-02-06 PROCEDURE — 84439 ASSAY OF FREE THYROXINE: CPT

## 2020-02-06 PROCEDURE — 99999 PR PBB SHADOW E&M-EST. PATIENT-LVL III: CPT | Mod: PBBFAC,,, | Performed by: FAMILY MEDICINE

## 2020-02-06 NOTE — PROGRESS NOTES
"Subjective:       Patient ID: Alea Barrett is a 30 y.o. female.    Chief Complaint: No chief complaint on file.    New to me patient here for UC visit.  Heart rate has gotten up into the 130's - 140's.  Past VS review showed HR just above 100 in past several OV's since 2017.    Palpitations    This is a new problem. The current episode started in the past 7 days. The problem occurs 2 to 4 times per day. The problem has been unchanged. Nothing aggravates the symptoms. Associated symptoms include dizziness and an irregular heartbeat. Pertinent negatives include no chest pain, fever, nausea, shortness of breath or syncope. She has tried nothing for the symptoms.     Review of Systems   Constitutional: Positive for appetite change (decreased) and unexpected weight change. Negative for fever.   Respiratory: Negative for shortness of breath.    Cardiovascular: Positive for palpitations. Negative for chest pain and syncope.   Gastrointestinal: Positive for diarrhea. Negative for abdominal pain and nausea.   Endocrine: Positive for cold intolerance.   Skin: Negative for rash.   Neurological: Positive for dizziness.   All other systems reviewed and are negative.      Objective:      Physical Exam   Constitutional: She appears well-developed and well-nourished.   Eyes: Pupils are equal, round, and reactive to light. No scleral icterus.   Neck: Neck supple. No thyroid mass present.   Thyroid feels somewhat "full"; no TTP   Cardiovascular: Regular rhythm. Tachycardia present. Exam reveals no gallop.   No murmur heard.     Pulmonary/Chest: Effort normal and breath sounds normal.   Abdominal: Soft. Bowel sounds are normal. There is no tenderness.   Musculoskeletal: She exhibits no edema or tenderness.   Lymphadenopathy:     She has no cervical adenopathy.   Skin: Skin is warm and dry.       Assessment:       1. Tachycardia        Plan:       Tachycardia  -     CBC auto differential; Future; Expected date: 02/06/2020  -     " Basic metabolic panel; Future; Expected date: 02/06/2020  -     T4, free; Future; Expected date: 02/06/2020  -     T3, free; Future; Expected date: 02/06/2020  -     Cancel: HVA/VMA Urine Random  -     Holter monitor - 24 hour; Future  -     HVA/VMA Urine Random; Future; Expected date: 02/06/2020      Patient Instructions   Goal for fluids (water intake) is 75 ounces a day.

## 2020-02-07 LAB
25(OH)D3+25(OH)D2 SERPL-MCNC: 4 NG/ML (ref 30–96)
ANION GAP SERPL CALC-SCNC: 12 MMOL/L (ref 8–16)
BUN SERPL-MCNC: 12 MG/DL (ref 6–20)
CALCIUM SERPL-MCNC: 9.8 MG/DL (ref 8.7–10.5)
CHLORIDE SERPL-SCNC: 105 MMOL/L (ref 95–110)
CO2 SERPL-SCNC: 25 MMOL/L (ref 23–29)
CREAT SERPL-MCNC: 0.8 MG/DL (ref 0.5–1.4)
EST. GFR  (AFRICAN AMERICAN): >60 ML/MIN/1.73 M^2
EST. GFR  (NON AFRICAN AMERICAN): >60 ML/MIN/1.73 M^2
GLUCOSE SERPL-MCNC: 76 MG/DL (ref 70–110)
POTASSIUM SERPL-SCNC: 3.6 MMOL/L (ref 3.5–5.1)
SODIUM SERPL-SCNC: 142 MMOL/L (ref 136–145)
T3FREE SERPL-MCNC: 3 PG/ML (ref 2.3–4.2)

## 2020-02-08 ENCOUNTER — NURSE TRIAGE (OUTPATIENT)
Dept: ADMINISTRATIVE | Facility: CLINIC | Age: 30
End: 2020-02-08

## 2020-02-08 NOTE — TELEPHONE ENCOUNTER
Patient's mom is calling stating she was seen Thursday at [facility] by Dr. Choi for a rapid heart rate. She states patient is now at work but she called and told her her heart rate is now 144. She will be getting a holster monitor on Monday. Attempted to contact caller x 3 to triage her but no answer. Mom advised to have her taken to ED. Understanding verbalized. Please contact caller directly to discuss any further care advice.    Reason for Disposition   [1] Caller is not with the adult (patient) AND [2] probable NON-URGENT symptoms   [1] Heart beating very rapidly (e.g., > 140 / minute) AND [2] present now  (Exception: during exercise)    Additional Information   Negative: [1] Caller is not with the adult (patient) AND [2] reporting urgent symptoms   Negative: Lab result questions   Negative: Medication questions   Negative: Caller can't be reached by phone   Negative: Caller has already spoken to PCP or another triager   Negative: Question about upcoming scheduled test, no triage required and triager able to answer question   Negative: General information question, no triage required and triager able to answer question   Negative: Health Information question, no triage required and triager able to answer question   Negative: Requesting regular office appointment   Negative: [1] Caller requesting NON-URGENT health information AND [2] PCP's office is the best resource   Negative: RN needs further essential information from caller in order to complete triage   Negative: Passed out (i.e., lost consciousness, collapsed and was not responding)   Negative: Shock suspected (e.g., cold/pale/clammy skin, too weak to stand, low BP, rapid pulse)   Negative: Difficult to awaken or acting confused (e.g., disoriented, slurred speech)   Negative: Visible sweat on face or sweat dripping down face   Negative: Unable to walk, or can only walk with assistance (e.g., requires support)   Negative: [1]  Received SHOCK from implantable cardiac defibrillator AND [2] persisting symptoms (i.e., palpitations, lightheadedness)   Negative: Sounds like a life-threatening emergency to the triager   Negative: Chest pain   Negative: Difficulty breathing   Negative: Dizziness, lightheadedness, or weakness    Protocols used: INFORMATION ONLY CALL-A-AH, HEART RATE AND HEARTBEAT [id number]

## 2020-02-10 ENCOUNTER — HOSPITAL ENCOUNTER (OUTPATIENT)
Dept: CARDIOLOGY | Facility: HOSPITAL | Age: 30
Discharge: HOME | End: 2020-02-10
Attending: FAMILY MEDICINE
Payer: COMMERCIAL

## 2020-02-10 DIAGNOSIS — R00.0 TACHYCARDIA: ICD-10-CM

## 2020-02-10 PROCEDURE — 93225 XTRNL ECG REC<48 HRS REC: CPT

## 2020-02-10 PROCEDURE — 93227 XTRNL ECG REC<48 HR R&I: CPT | Mod: ,,, | Performed by: INTERNAL MEDICINE

## 2020-02-10 PROCEDURE — 93227 HOLTER MONITOR - 24 HOUR (CUPID ONLY): ICD-10-PCS | Mod: ,,, | Performed by: INTERNAL MEDICINE

## 2020-02-10 NOTE — TELEPHONE ENCOUNTER
I spoke to patients mother who stated that her daughters pulse came down to 88 so they did not report to the ER.  Lab results given.  Pt's mother verbalized understanding.

## 2020-02-12 LAB
OHS CV EVENT MONITOR DAY: 0
OHS CV HOLTER LENGTH DECIMAL HOURS: 23.98
OHS CV HOLTER LENGTH HOURS: 23
OHS CV HOLTER LENGTH MINUTES: 59

## 2020-02-14 ENCOUNTER — TELEPHONE (OUTPATIENT)
Dept: FAMILY MEDICINE | Facility: CLINIC | Age: 30
End: 2020-02-14

## 2020-02-14 ENCOUNTER — TELEPHONE (OUTPATIENT)
Dept: UROLOGY | Facility: CLINIC | Age: 30
End: 2020-02-14

## 2020-02-14 NOTE — TELEPHONE ENCOUNTER
----- Message from Yany Thomas sent at 2/14/2020  3:36 PM CST -----  Contact: mother  Type:  Patient Returning Call    Who Called: Sharmila  Who Left Message for Patient:  Jaquelin  Does the patient know what this is regarding?:  unsure  Best Call Back Number:    Additional Information:  na

## 2020-02-14 NOTE — TELEPHONE ENCOUNTER
----- Message from Camilo Whipple sent at 2/14/2020  3:27 PM CST -----  Contact: Mother of pt   Mother calling in regards to a call back from nurse in office. Mother states she received a call but person didn't state who was calling, only stated Doctor name    Please advise pt can be contact at [phone]

## 2020-02-14 NOTE — TELEPHONE ENCOUNTER
----- Message from Megan Bernal sent at 2/14/2020  3:49 PM CST -----  Type:  Patient Returning Call    Who Called:  Sharmila Barrett (Mother)  Who Left Message for Patient:  lela  Does the patient know what this is regarding?:    Best Call Back Number:  [phone]  Additional Information:  Please call mom's number because patient is at work and unable to answer the phone

## 2020-02-18 ENCOUNTER — HOSPITAL ENCOUNTER (EMERGENCY)
Facility: HOSPITAL | Age: 30
Discharge: HOME | End: 2020-02-18
Attending: EMERGENCY MEDICINE
Payer: COMMERCIAL

## 2020-02-18 VITALS
BODY MASS INDEX: 27.64 KG/M2 | HEART RATE: 83 BPM | DIASTOLIC BLOOD PRESSURE: 72 MMHG | SYSTOLIC BLOOD PRESSURE: 118 MMHG | OXYGEN SATURATION: 100 % | TEMPERATURE: 98 F | WEIGHT: 156 LBS | HEIGHT: 63 IN | RESPIRATION RATE: 18 BRPM

## 2020-02-18 DIAGNOSIS — R07.89 CHEST DISCOMFORT: ICD-10-CM

## 2020-02-18 DIAGNOSIS — R07.9 CHEST PAIN, UNSPECIFIED TYPE: Primary | ICD-10-CM

## 2020-02-18 LAB
ANION GAP SERPL CALC-SCNC: 10 MMOL/L (ref 8–16)
BASOPHILS # BLD AUTO: 0.04 K/UL (ref 0–0.2)
BASOPHILS NFR BLD: 0.4 % (ref 0–1.9)
BUN SERPL-MCNC: 4 MG/DL (ref 6–20)
CALCIUM SERPL-MCNC: 9.7 MG/DL (ref 8.7–10.5)
CHLORIDE SERPL-SCNC: 107 MMOL/L (ref 95–110)
CO2 SERPL-SCNC: 24 MMOL/L (ref 23–29)
CREAT SERPL-MCNC: 0.7 MG/DL (ref 0.5–1.4)
D DIMER PPP IA.FEU-MCNC: <0.19 MG/L FEU
DIFFERENTIAL METHOD: NORMAL
EOSINOPHIL # BLD AUTO: 0.1 K/UL (ref 0–0.5)
EOSINOPHIL NFR BLD: 0.5 % (ref 0–8)
ERYTHROCYTE [DISTWIDTH] IN BLOOD BY AUTOMATED COUNT: 12.2 % (ref 11.5–14.5)
EST. GFR  (AFRICAN AMERICAN): >60 ML/MIN/1.73 M^2
EST. GFR  (NON AFRICAN AMERICAN): >60 ML/MIN/1.73 M^2
GLUCOSE SERPL-MCNC: 89 MG/DL (ref 70–110)
HCT VFR BLD AUTO: 38.1 % (ref 37–48.5)
HGB BLD-MCNC: 12.2 G/DL (ref 12–16)
IMM GRANULOCYTES # BLD AUTO: 0.03 K/UL (ref 0–0.04)
LYMPHOCYTES # BLD AUTO: 2.8 K/UL (ref 1–4.8)
LYMPHOCYTES NFR BLD: 30.4 % (ref 18–48)
MCH RBC QN AUTO: 28.2 PG (ref 27–31)
MCHC RBC AUTO-ENTMCNC: 32 G/DL (ref 32–36)
MCV RBC AUTO: 88 FL (ref 82–98)
MONOCYTES # BLD AUTO: 0.4 K/UL (ref 0.3–1)
MONOCYTES NFR BLD: 4.8 % (ref 4–15)
NEUTROPHILS # BLD AUTO: 5.8 K/UL (ref 1.8–7.7)
NEUTROPHILS NFR BLD: 63.6 % (ref 38–73)
NRBC BLD-RTO: 0 /100 WBC
PLATELET # BLD AUTO: 260 K/UL (ref 150–350)
PMV BLD AUTO: 10.2 FL (ref 9.2–12.9)
POTASSIUM SERPL-SCNC: 3.2 MMOL/L (ref 3.5–5.1)
RBC # BLD AUTO: 4.32 M/UL (ref 4–5.4)
SODIUM SERPL-SCNC: 141 MMOL/L (ref 136–145)
TROPONIN I SERPL DL<=0.01 NG/ML-MCNC: <0.006 NG/ML (ref 0–0.03)
WBC # BLD AUTO: 9.17 K/UL (ref 3.9–12.7)

## 2020-02-18 PROCEDURE — 85025 COMPLETE CBC W/AUTO DIFF WBC: CPT

## 2020-02-18 PROCEDURE — 85379 FIBRIN DEGRADATION QUANT: CPT

## 2020-02-18 PROCEDURE — 93005 ELECTROCARDIOGRAM TRACING: CPT

## 2020-02-18 PROCEDURE — 99284 EMERGENCY DEPT VISIT MOD MDM: CPT | Mod: 25

## 2020-02-18 PROCEDURE — 84484 ASSAY OF TROPONIN QUANT: CPT

## 2020-02-18 PROCEDURE — 80048 BASIC METABOLIC PNL TOTAL CA: CPT

## 2020-02-18 PROCEDURE — 36415 COLL VENOUS BLD VENIPUNCTURE: CPT

## 2020-02-18 NOTE — ED PROVIDER NOTES
Encounter Date: 2/18/2020    SCRIBE #1 NOTE: IMaryann, am scribing for, and in the presence of, Dr. Luke.       History     Chief Complaint   Patient presents with    Chest Pain     x 2 days        Time seen by provider: 5:51 PM on 02/18/2020    Alea Barrett is a 30 y.o. female with anxiety and depression who presents to the ED with c/o left upper chest tightness that has been waxing and waning over the past 2-3 days. Patient states the episodes are  by a few minutes and her pain is worse with exertion and movement. She endorses nausea and one episode of feeling dizzy but denies any cough, SOB, fever, pain with breathing, vomiting, diaphoresis, leg swelling, or abdominal pain. Patient was seen by her PCP where she had labs and an elevated HR. No hx of hospitalization. No cardiopulmonary PMHx or PSHx. NKDA.     The history is provided by the patient.     Review of patient's allergies indicates:   Allergen Reactions    No known drug allergies      Past Medical History:   Diagnosis Date    Anxiety     Depression      Past Surgical History:   Procedure Laterality Date    ear tube      bilateral    EYE SURGERY      WRIST SURGERY  04/2019    Carpal Tunnel     Family History   Problem Relation Age of Onset    Hypertension Mother     Arthritis Mother     Hypertension Father     Arthritis Father     Hypertension Maternal Grandmother     Arthritis Maternal Grandmother     Hypertension Paternal Grandmother     Diabetes Paternal Grandmother     Arthritis Paternal Grandmother     Hyperlipidemia Paternal Grandmother     Heart disease Maternal Grandfather     Hypertension Maternal Grandfather     Cancer Paternal Grandfather      Social History     Tobacco Use    Smoking status: Never Smoker    Smokeless tobacco: Never Used   Substance Use Topics    Alcohol use: No     Frequency: Monthly or less     Drinks per session: 1 or 2     Binge frequency: Less than monthly    Drug use: No      Review of Systems   Constitutional: Negative for diaphoresis and fever.   Respiratory: Positive for chest tightness. Negative for cough and shortness of breath.    Cardiovascular: Negative for leg swelling.   Gastrointestinal: Positive for nausea. Negative for abdominal pain and vomiting.   Genitourinary: Negative for flank pain.   Musculoskeletal: Negative for gait problem.   Neurological: Positive for dizziness. Negative for weakness.   Psychiatric/Behavioral: Negative for confusion.       Physical Exam     Initial Vitals [02/18/20 1741]   BP Pulse Resp Temp SpO2   131/73 97 18 98.2 °F (36.8 °C) 100 %      MAP       --         Physical Exam    Nursing note and vitals reviewed.  Constitutional: She appears well-developed and well-nourished. She is not diaphoretic. No distress.   HENT:   Head: Normocephalic and atraumatic.   Mouth/Throat: Oropharynx is clear and moist.   Eyes: Conjunctivae are normal.   Neck: Neck supple.   Cardiovascular: Normal rate, regular rhythm, normal heart sounds and intact distal pulses. Exam reveals no gallop and no friction rub.    No murmur heard.  Pulses:       Radial pulses are 2+ on the right side, and 2+ on the left side.        Dorsalis pedis pulses are 2+ on the right side, and 2+ on the left side.        Posterior tibial pulses are 2+ on the right side, and 2+ on the left side.   Pulmonary/Chest: Breath sounds normal. She has no wheezes. She has no rhonchi. She has no rales. She exhibits no tenderness and no crepitus.   Abdominal: Soft. She exhibits no distension. There is no tenderness.   Musculoskeletal: Normal range of motion.        Right lower leg: She exhibits no tenderness and no edema.        Left lower leg: She exhibits no tenderness and no edema.   No leg tenderness, asymmetry or edema    Neurological: She is alert and oriented to person, place, and time.   Skin: No rash noted. No erythema.   Psychiatric: Her speech is normal.         ED Course   Procedures  Labs  Reviewed   BASIC METABOLIC PANEL - Abnormal; Notable for the following components:       Result Value    Potassium 3.2 (*)     BUN, Bld 4 (*)     All other components within normal limits   CBC W/ AUTO DIFFERENTIAL   TROPONIN I   D DIMER, QUANTITATIVE          Imaging Results          X-Ray Chest PA And Lateral (In process)  Result time 02/18/20 18:09:31                 Medical Decision Making:   History:   Old Medical Records: I decided to obtain old medical records.  Independently Interpreted Test(s):   I have ordered and independently interpreted EKG Reading(s) - see prior notes  Clinical Tests:   Lab Tests: Ordered and Reviewed  Radiological Study: Ordered and Reviewed  Medical Tests: Ordered and Reviewed            Scribe Attestation:   Scribe #1: I performed the above scribed service and the documentation accurately describes the services I performed. I attest to the accuracy of the note.    I, Dr. Miguelito Luke, personally performed the services described in this documentation. All medical record entries made by the scribe were at my direction and in my presence.  I have reviewed the chart and agree that the record reflects my personal performance and is accurate and complete. Miguelito Luke MD.  10:21 PM 02/18/2020    Alea Barrett is a 30 y.o. female presenting with chest pain in this otherwise well-appearing patient.  Low risk HEART score with negative cardiac biomarker after multiple hours of symptoms for the past 2 days and nonischemic EKG.  I doubt ACS.  She is appropriate for outpatient cardiology follow-up.  I do not think she requires admission for cardiac monitoring or serial troponin evaluation.  D-dimer is negative with low suspicion for PE.  I doubt PE and do not think further CT is indicated.  I doubt aortic dissection.  I doubt other process such as pneumothorax.  Chest x-ray reviewed.  No sign of arrhythmia on EKG or monitor here.  Follow up with Cardiology for previous tachycardia  as previously planned.  Detailed return precautions reviewed as well.        ED Course as of Feb 18 2222   Tue Feb 18, 2020   1810 EKG:  Normal sinus rhythm at a rate of 80.  Normal intervals.  Normal axis.  No significant ST or T wave changes suggesting acute ischemia or infarction.  No MO depression or other sign of pericarditis.  No sign of arrhythmia including no delta waves, Brugada syndrome, or signs of hypertrophic cardiomyopathy.    [MR]   1911 CXR:  NAD. (my read)    [MR]      ED Course User Index  [MR] Miguelito Luke MD                Clinical Impression:       ICD-10-CM ICD-9-CM   1. Chest pain, unspecified type R07.9 786.50   2. Chest discomfort R07.89 786.59         Disposition:   Disposition: Discharged  Condition: Stable                     Miguelito Luke MD  02/18/20 2224

## 2020-02-19 NOTE — ED NOTES
Alea Barrett presents to the ED with c/o left sided chest pain that started 2 days ago. Patient reports that pain is exacerbated with exertion. Patient denies any SOB, nausea, emesis, diaphoresis and area of complaint is not reproducible. Mucous membranes are pink and moist. Skin is warm, dry and intact. Lungs are clear bilaterally, respirations are regular and unlabored. Denies SOB, cough, congestion or rhinorrhea. BS active x4, no tenderness with palpation, abd is soft and not distended. Denies any appetite or activity change. S1S2, capillary refill is < 2 seconds. Denies dysuria, difficulty urinating, frequency, numbness, tingling or weakness. GISSEL RAY

## 2020-02-19 NOTE — ED NOTES
Upon discharge, patient is AAOx4, no cardiac or respiratory complications. Follow up care reviewed with patient and has been instructed to return to the ER if needed. Patient verbalized understanding and ambulated to the lobby without difficulty. CORY CHAUHAN

## 2020-02-24 ENCOUNTER — PATIENT OUTREACH (OUTPATIENT)
Dept: ADMINISTRATIVE | Facility: OTHER | Age: 30
End: 2020-02-24

## 2020-02-26 ENCOUNTER — OFFICE VISIT (OUTPATIENT)
Dept: CARDIOLOGY | Facility: CLINIC | Age: 30
End: 2020-02-26
Payer: COMMERCIAL

## 2020-02-26 VITALS
WEIGHT: 157.88 LBS | DIASTOLIC BLOOD PRESSURE: 72 MMHG | HEART RATE: 101 BPM | SYSTOLIC BLOOD PRESSURE: 123 MMHG | HEIGHT: 63 IN | OXYGEN SATURATION: 99 % | BODY MASS INDEX: 27.97 KG/M2

## 2020-02-26 DIAGNOSIS — R00.2 PALPITATIONS: ICD-10-CM

## 2020-02-26 DIAGNOSIS — E87.6 HYPOKALEMIA: ICD-10-CM

## 2020-02-26 DIAGNOSIS — R63.4 WEIGHT LOSS, UNINTENTIONAL: ICD-10-CM

## 2020-02-26 DIAGNOSIS — E55.9 VITAMIN D DEFICIENCY: ICD-10-CM

## 2020-02-26 DIAGNOSIS — R00.0 TACHYCARDIA: Primary | ICD-10-CM

## 2020-02-26 PROCEDURE — 99999 PR PBB SHADOW E&M-EST. PATIENT-LVL V: ICD-10-PCS | Mod: PBBFAC,,, | Performed by: NURSE PRACTITIONER

## 2020-02-26 PROCEDURE — 99204 OFFICE O/P NEW MOD 45 MIN: CPT | Mod: S$GLB,,, | Performed by: NURSE PRACTITIONER

## 2020-02-26 PROCEDURE — 99204 PR OFFICE/OUTPT VISIT, NEW, LEVL IV, 45-59 MIN: ICD-10-PCS | Mod: S$GLB,,, | Performed by: NURSE PRACTITIONER

## 2020-02-26 PROCEDURE — 99999 PR PBB SHADOW E&M-EST. PATIENT-LVL V: CPT | Mod: PBBFAC,,, | Performed by: NURSE PRACTITIONER

## 2020-02-26 PROCEDURE — 3008F BODY MASS INDEX DOCD: CPT | Mod: CPTII,S$GLB,, | Performed by: NURSE PRACTITIONER

## 2020-02-26 PROCEDURE — 3008F PR BODY MASS INDEX (BMI) DOCUMENTED: ICD-10-PCS | Mod: CPTII,S$GLB,, | Performed by: NURSE PRACTITIONER

## 2020-02-26 RX ORDER — CHOLECALCIFEROL (VITAMIN D3) 25 MCG
2000 TABLET ORAL DAILY
COMMUNITY
End: 2023-05-22

## 2020-02-26 RX ORDER — ERGOCALCIFEROL 1.25 MG/1
50000 CAPSULE ORAL
Qty: 6 CAPSULE | Refills: 0 | Status: SHIPPED | OUTPATIENT
Start: 2020-02-26 | End: 2020-03-17

## 2020-02-26 NOTE — PATIENT INSTRUCTIONS
Thank you for coming to your cardiology appointment today.     Points of care discussed at today's appointment:   Increase your Vit D to 50,000 units once weekly for 6 weeks. Then resume Vit D 1,000 mg daily.       If you have any questions or concerns related to your cardiology care, please call [phone].        Thank you,  Maryann HIGGINS, DNP-C  02/26/2020

## 2020-02-26 NOTE — LETTER
February 26, 2020      Wes Choi MD  [address]  [facility] [address]           [facility] - Cardiology  [address]  [place] [address]  Phone: [phone]          Patient: Alea Barrett   MR Number: [id number]   YOB: 1990   Date of Visit: 2/26/2020       Dear Dr. Wes Choi:    Thank you for referring Alea Barrett to me for evaluation. Attached you will find relevant portions of my assessment and plan of care.    If you have questions, please do not hesitate to call me. I look forward to following Alea Barrett along with you.    Sincerely,    Maryann Reyes, [place]    Enclosure  CC:  No Recipients    If you would like to receive this communication electronically, please contact [email or web address] or [phone] to request more information on [email or web address] Link access.    For providers and/or their staff who would like to refer a patient to [facility], please contact us through our one-stop-shop provider referral line, [facility], at [phone].    If you feel you have received this communication in error or would no longer like to receive these types of communications, please e-mail [email or web address]

## 2020-02-26 NOTE — PROGRESS NOTES
Cardiology Consults Clinic Note    Subjective:   Chief Complaint: Tachycardia; Chest Pain; Shortness of Breath; and Dizziness  Last Clinic Visit: New Patient     Problems:  Hypokalemia  Tachycardia  Unintentional weight loss    History of Present Illness: Alea Barrett is a 30 y.o. female who presents for evaluation of tachycardia and palpitations. She was seen in the ED on 2/18 with reports of chest pain associated with chest fluttering.  ED workup was unremarkable with negative cardiac biomarker and nonischemic EKG. Completed 24 hour holter revealing of sinus tachycardia with max  bpm and rare ectopy.   Reports that for approximately 6 weeks she has found her HR to be randomly and briefly elevated, rates up to 130s-140s.  She associates tachycardia with mild shortness of breath and brief episodes of chest fluttering.  She has also had episodes of feeling dizzy.      Review of labs also revealing of vitamin d deficiency and hypokalemia. Reports that she has lost approximately 20 lbs in the past 2 month.  She is not dieting or exercising, reports that she has not been trying to lose weight. She attributes weight loss to stress and working nearly 80+ hour work weeks.  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      Review of Systems   Constitution: Positive for malaise/fatigue and weight loss. Negative for decreased appetite, fever and weight gain.   HENT: Negative for congestion, hearing loss and nosebleeds.    Eyes: Negative for visual disturbance.   Cardiovascular: Positive for irregular heartbeat and palpitations. Negative for chest pain, dyspnea on exertion, leg swelling and syncope.   Respiratory: Negative for cough, shortness of breath and sleep disturbances due to breathing.    Endocrine: Negative for cold intolerance and heat intolerance.   Hematologic/Lymphatic: Negative for bleeding problem. Does not bruise/bleed easily.   Gastrointestinal: Negative for  "bloating, abdominal pain, change in bowel habit and heartburn.   Neurological: Positive for dizziness. Negative for loss of balance and numbness.   Psychiatric/Behavioral: Negative for altered mental status. The patient is not nervous/anxious.        Medications:  Patient's Medications   New Prescriptions    ERGOCALCIFEROL (ERGOCALCIFEROL) 50,000 UNIT CAP    Take 1 capsule (50,000 Units total) by mouth every 7 days.   Previous Medications    AMITRIPTYLINE (ELAVIL) 10 MG TABLET    Take 1 tablet (10 mg total) by mouth every evening.    NORETHINDRONE-ETHINYL ESTRADIOL (MICROGESTIN 1/20) 1-20 MG-MCG PER TABLET    Take 1 tablet by mouth once daily.    VITAMIN D (VITAMIN D3) 1000 UNITS TAB    Take 1,000 Units by mouth once daily.   Modified Medications    No medications on file   Discontinued Medications    No medications on file       Family History:  Alea's family history includes Arthritis in her father, maternal grandmother, mother, and paternal grandmother; Cancer in her paternal grandfather; Diabetes in her paternal grandmother; Heart disease in her maternal grandfather; Hyperlipidemia in her father and paternal grandmother; Hypertension in her father, maternal grandfather, maternal grandmother, mother, and paternal grandmother; Pacemaker/defibrilator in her maternal grandmother.    Social History:  Alea reports that she has never smoked. She has never used smokeless tobacco. She reports that she does not drink alcohol or use drugs.    Objective:   /72 (BP Location: Left arm, Patient Position: Sitting, BP Method: Medium (Automatic))   Pulse 101   Ht 5' 3" (1.6 m)   Wt 71.6 kg (157 lb 13.6 oz)   SpO2 99%   BMI 27.96 kg/m²     Physical Exam   Constitutional: She is oriented to person, place, and time. Vital signs are normal. She appears well-developed and well-nourished.   HENT:   Head: Normocephalic.   Eyes: Pupils are equal, round, and reactive to light.   Neck: Normal range of motion. Neck supple. No " JVD present. Carotid bruit is not present.   Cardiovascular: Normal rate, regular rhythm, S1 normal, S2 normal, normal heart sounds and intact distal pulses.  Occasional extrasystoles are present. PMI is not displaced. Exam reveals no gallop and no friction rub.   No murmur heard.  Pulses:       Carotid pulses are 2+ on the right side, and 2+ on the left side.       Radial pulses are 2+ on the right side, and 2+ on the left side.        Dorsalis pedis pulses are 2+ on the right side, and 2+ on the left side.        Posterior tibial pulses are 1+ on the right side, and 1+ on the left side.   Pulmonary/Chest: Effort normal and breath sounds normal. No accessory muscle usage. She has no decreased breath sounds. She has no wheezes.   Abdominal: Soft. Normal appearance and bowel sounds are normal. She exhibits no distension, no fluid wave and no ascites. There is no hepatosplenomegaly. There is no tenderness.   Musculoskeletal: Normal range of motion. She exhibits no edema.   Neurological: She is alert and oriented to person, place, and time. She has normal strength.   Skin: Skin is warm, dry and intact. No ecchymosis and no rash noted. No erythema.   Psychiatric: She has a normal mood and affect. Her speech is normal and behavior is normal. Judgment and thought content normal. Cognition and memory are normal.   Vitals reviewed.      EKG:  My independent visualization of most recent EKG is NSR 80 bpm. Normal EKG.     Lipids:  Recent Labs   Lab 11/04/19  0854   LDL Cholesterol 100.0   HDL 74   Cholesterol 186      Renal:  Recent Labs   Lab 02/18/20  1816   Creatinine 0.7   Potassium 3.2 L   CO2 24   BUN, Bld 4 L     Liver:  Recent Labs   Lab 11/04/19  0854   AST 18   ALT 17       Assessment:     1. Tachycardia    2. Palpitations    3. Hypokalemia    4. Vitamin D deficiency    5. Weight loss, unintentional      Plan:     Alea Barrett was seen in clinic today for tachycardia and palpitations     Diagnoses and associated  orders include:    Tachycardia  Comments:  24 hour holter completed: sinus tachycardia with rare ectopy  Atypical presentation of tachycardia, no not suspect cardiac cause   Refer to endocrine     Orders:  -     ALDOSTERONE; Future; Expected date: 02/26/2020  -     CORTISOL, 8AM; Future; Expected date: 02/26/2020  -     CORTISOL, 11PM; Future; Expected date: 02/26/2020  -     Ambulatory referral/consult to Endocrinology; Future; Expected date: 03/04/2020    Palpitations  Comments:  24 hour holter completed with rare ectopy  Suspect benign premature beats  Correct electrolyte deficiencies     Hypokalemia  Comments:  K 3.2  Monitor aldosterone and cortisol levels   Orders:  -     Ambulatory referral/consult to Endocrinology; Future; Expected date: 03/04/2020    Vitamin D deficiency  Comments:  Start high dose Vit D2 once weekly for 6 weeks, then resume Vit D3 1000 units daily.   Orders:  -     ergocalciferol (ERGOCALCIFEROL) 50,000 unit Cap; Take 1 capsule (50,000 Units total) by mouth every 7 days.  Dispense: 6 capsule; Refill: 0    Weight loss, unintentional  Comments:  20 lb weight loss in 2 months  Attributes to busy lifestyle and high stress  Not dieting or exercising   Orders:  -     Ambulatory referral/consult to Endocrinology; Future; Expected date: 03/04/2020      This patient was seen and discussed with Dr. Elizabeth.     Maryann HIGGINS DNP  02/26/2020  5:50 PM      Follow-up:     Future Appointments   Date Time Provider Department Center   2/27/2020 11:30 AM CHET MUNOZ [facility]   2/27/2020  4:30 PM CHET MUNOZ [facility]   3/2/2020  8:30 AM Kirill Avila MD [facility]

## 2020-02-27 ENCOUNTER — LAB VISIT (OUTPATIENT)
Dept: LAB | Facility: HOSPITAL | Age: 30
End: 2020-02-27
Payer: COMMERCIAL

## 2020-02-27 DIAGNOSIS — R00.0 TACHYCARDIA: ICD-10-CM

## 2020-02-27 LAB — CORTIS SERPL-MCNC: 16.2 UG/DL (ref 4.3–22.4)

## 2020-02-27 PROCEDURE — 82533 TOTAL CORTISOL: CPT

## 2020-02-27 PROCEDURE — 36415 COLL VENOUS BLD VENIPUNCTURE: CPT | Mod: PO

## 2020-03-01 ENCOUNTER — PATIENT OUTREACH (OUTPATIENT)
Dept: ADMINISTRATIVE | Facility: OTHER | Age: 30
End: 2020-03-01

## 2020-03-02 ENCOUNTER — OFFICE VISIT (OUTPATIENT)
Dept: ENDOCRINOLOGY | Facility: CLINIC | Age: 30
End: 2020-03-02
Payer: COMMERCIAL

## 2020-03-02 VITALS
SYSTOLIC BLOOD PRESSURE: 116 MMHG | BODY MASS INDEX: 27.98 KG/M2 | DIASTOLIC BLOOD PRESSURE: 64 MMHG | WEIGHT: 157.94 LBS | HEIGHT: 63 IN | HEART RATE: 91 BPM

## 2020-03-02 DIAGNOSIS — R00.0 TACHYCARDIA: ICD-10-CM

## 2020-03-02 DIAGNOSIS — F41.8 MIXED ANXIETY DEPRESSIVE DISORDER: ICD-10-CM

## 2020-03-02 DIAGNOSIS — E55.9 VITAMIN D DEFICIENCY: ICD-10-CM

## 2020-03-02 DIAGNOSIS — R00.2 PALPITATIONS: ICD-10-CM

## 2020-03-02 PROCEDURE — 3008F PR BODY MASS INDEX (BMI) DOCUMENTED: ICD-10-PCS | Mod: CPTII,S$GLB,, | Performed by: INTERNAL MEDICINE

## 2020-03-02 PROCEDURE — 99999 PR PBB SHADOW E&M-EST. PATIENT-LVL III: CPT | Mod: PBBFAC,,, | Performed by: INTERNAL MEDICINE

## 2020-03-02 PROCEDURE — 99999 PR PBB SHADOW E&M-EST. PATIENT-LVL III: ICD-10-PCS | Mod: PBBFAC,,, | Performed by: INTERNAL MEDICINE

## 2020-03-02 PROCEDURE — 99204 PR OFFICE/OUTPT VISIT, NEW, LEVL IV, 45-59 MIN: ICD-10-PCS | Mod: S$GLB,,, | Performed by: INTERNAL MEDICINE

## 2020-03-02 PROCEDURE — 99204 OFFICE O/P NEW MOD 45 MIN: CPT | Mod: S$GLB,,, | Performed by: INTERNAL MEDICINE

## 2020-03-02 PROCEDURE — 3008F BODY MASS INDEX DOCD: CPT | Mod: CPTII,S$GLB,, | Performed by: INTERNAL MEDICINE

## 2020-03-02 NOTE — PROGRESS NOTES
"ENDOCRINOLOGY CLINIC NEW PATIENT NOTE  03/02/2020     The patient's last visit with me was on Visit date not found.     Subjective:      Reason for referal: referred by Maryann Reyes, NP for management of palpitations and tachycardia.    HPI:   Alea Barrett is a 30 y.o. female with a history of depression /anxiety and IBS who presents for evaluation of episodic tachycardia and palpitations.    Since mid December 2019 has been having periods of palpitations (sometimes associate lightheadedness, shortness of breath, no CP).  Per fitbit HR going to 130s-140s, has been wearing it since 2018.  Resting HR in 90s.  Having episodes daily lasting less than a minute several times a day.  Has not noticed any triggers or patterns, happens at rest too.  Last month she had 1 severe episode that was persistent and per patient felt like she was having a heart attack so she went to the ER.  At that time she had a normal EKG and was referred to Cardiology.  She saw Cardiology and had 24 hour holter completed showing sinus tachycardia with rare ectopy.  Per cardiology note, she had Atypical presentation of tachycardia, no not suspect cardiac cause" thus was referred to endocrine for further evaluation.    She also had normal free T3 and free T4 last month however latest TSH was last year, normal.     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  She does not feel her symptoms are related to anxiety.    Pt reports no abdominal discomfort.  No h/o hypertension or hypertensive emergency.  - pallor  +palpitations  Positive weight loss      Has been on amitriptyline x 4 years for anxiety/depression/IBS    Thyroid Symptoms    Weight change:  []  Gain [x]  Loss  []  Denies   Unintentional 23 lb weight loss from Nov-Jan [id number]  Not eating as much b/c busy    Temperature intolerance:  [x]  Cold [x]  Hot   []  Denies   both- unchanged    GI:  []  Diarrhea []  Constipation [x]  " Denies    Integument:  []  Hair loss []  Dry skin  [x]  Denies    Other:  [x]  Palpitation [x]  tremor    episodic tremor - sometimes unrelated to palpitations     [x]  Increased anxiety    []  Denies     Ref. Range 11/4/2019 08:54 2/6/2020 16:26   TSH Latest Ref Range: 0.400 - 4.000 uIU/mL 0.580    T3, Free Latest Ref Range: 2.3 - 4.2 pg/mL  3.0   Free T4 Latest Ref Range: 0.71 - 1.51 ng/dL  0.89     With regards to the vitamin d deficiency:  currently taking Ergocalciferol 50 k weekly, started by Cardiology in February 2020.    Lab Results   Component Value Date    EIBAFIBC34OH 4 (L) 02/06/2020          Past Medical History:   Diagnosis Date    Anxiety     Depression        Past Surgical History:   Procedure Laterality Date    ear tube      bilateral    EYE SURGERY      WRIST SURGERY  04/2019    Carpal Tunnel       Review of patient's allergies indicates:   Allergen Reactions    No known drug allergies          Current Outpatient Medications:     norethindrone-ethinyl estradiol (MICROGESTIN 1/20) 1-20 mg-mcg per tablet, Take 1 tablet by mouth once daily., Disp: , Rfl: 6    vitamin D (VITAMIN D3) 1000 units Tab, Take 1,000 Units by mouth once daily., Disp: , Rfl:     amitriptyline (ELAVIL) 10 MG tablet, Take 1 tablet (10 mg total) by mouth every evening. (Patient taking differently: Take 25 mg by mouth every evening. ), Disp: 30 tablet, Rfl: 2    ergocalciferol (ERGOCALCIFEROL) 50,000 unit Cap, Take 1 capsule (50,000 Units total) by mouth every 7 days. (Patient not taking: Reported on 3/2/2020), Disp: 6 capsule, Rfl: 0    Social History     Socioeconomic History    Marital status: Single     Spouse name: Not on file    Number of children: Not on file    Years of education: Not on file    Highest education level: Not on file   Occupational History    Not on file   Social Needs    Financial resource strain: Not very hard    Food insecurity:     Worry: Never true     Inability: Never true     "Transportation needs:     Medical: No     Non-medical: No   Tobacco Use    Smoking status: Never Smoker    Smokeless tobacco: Never Used   Substance and Sexual Activity    Alcohol use: No     Frequency: Monthly or less     Drinks per session: 1 or 2     Binge frequency: Less than monthly    Drug use: No    Sexual activity: Not on file   Lifestyle    Physical activity:     Days per week: 2 days     Minutes per session: 30 min    Stress: Only a little   Relationships    Social connections:     Talks on phone: More than three times a week     Gets together: More than three times a week     Attends Hoahaoism service: Not on file     Active member of club or organization: No     Attends meetings of clubs or organizations: Never     Relationship status: Never    Other Topics Concern    Not on file   Social History Narrative    Not on file       Family History   Problem Relation Age of Onset    Hypertension Mother     Arthritis Mother     Hypertension Father     Arthritis Father     Hyperlipidemia Father     Hypertension Maternal Grandmother     Arthritis Maternal Grandmother     Pacemaker/defibrilator Maternal Grandmother     Hypertension Paternal Grandmother     Diabetes Paternal Grandmother     Arthritis Paternal Grandmother     Hyperlipidemia Paternal Grandmother     Hypertension Maternal Grandfather     Heart disease Maternal Grandfather     Cancer Paternal Grandfather        ROS:  14 point review of systems reviewed.  Pertinent positives and negatives per HPI.  All others negative.    Objective:   Physical Exam   /64   Pulse 91   Ht 5' 3" (1.6 m)   Wt 71.6 kg (157 lb 15.4 oz)   BMI 27.98 kg/m²   Wt Readings from Last 3 Encounters:   03/02/20 71.6 kg (157 lb 15.4 oz)   02/26/20 71.6 kg (157 lb 13.6 oz)   02/18/20 70.8 kg (156 lb)   ]    Constitutional:  Pleasant,  in no acute distress.   HENT:   Head:    Normocephalic and atraumatic.   Mouth/Throat:   Oropharynx is clear and " moist. No oropharyngeal exudate.   Eyes:    EOMI. No scleral icterus.   Neck:    No thyromegaly or palpable thyroid nodules, no cervical LAD  Cardiovascular:  Normal rate, regular rhythm, no murmurs.  No carotid bruits.  Respiratory:   Effort normal and breath sounds normal.   Gastrointestinal: Soft, nontender  Neurological:  + fine tremor of outstretched hands, normal speech  Skin:    Skin is warm, dry  Psych:   Normal mood and affect.   Extremity:  No edema or wounds, no deformity    LABORATORY REVIEW:    Chemistry        Component Value Date/Time     02/18/2020 1816    K 3.2 (L) 02/18/2020 1816     02/18/2020 1816    CO2 24 02/18/2020 1816    BUN 4 (L) 02/18/2020 1816    CREATININE 0.7 02/18/2020 1816    GLU 89 02/18/2020 1816        Component Value Date/Time    CALCIUM 9.7 02/18/2020 1816    ALKPHOS 62 11/04/2019 0854    AST 18 11/04/2019 0854    ALT 17 11/04/2019 0854    BILITOT 0.2 11/04/2019 0854    ESTGFRAFRICA >60 02/18/2020 1816    EGFRNONAA >60 02/18/2020 1816          No results found for: HGBA1C  Other labs reviewed today in HPI    Assessment/Plan:     Problem List Items Addressed This Visit        1 - High    Palpitations     Patient continues to have frequent episodes of palpitations however they last less than 30 sec and are relatively mild now.  She only had 1 severe episode of palpitations and chest discomfort that took her to the ER last month which may have been related to a panic attack as symptoms seemed different than current symptoms.  Given episodically nature I do not think this is related to thyroid dysfunction but will repeat thyroid labs today to definitively rule out.  She did have some reported weight loss that she attributes to stress but this could have been a transient hyperthyroidism.         Tachycardia     Holter monitor with sinus tachycardia with average heart rate 103 which may be her baseline.  I doubt that she has hyperthyroidism or pheochromocytoma.  We  discussed testing plasma metanephrines may show a slightly high level as she is currently taking Amitriptyline (has been on this medication for 4 years) however if very high will need further investigation.  My suspicion for pheochromocytoma or paraganglioma is low.         Relevant Orders    Basic metabolic panel (Completed)    T3, free (Completed)    TSH (Completed)    T4, free (Completed)    Metanephrines, Plasma Free       3     Mixed anxiety depressive disorder     Periodic palpitations and tachycardia may be related to stress and anxiety.  If endocrine workup negative she will discuss adjusting medications with her primary care doctor.            Unprioritized    Vitamin D deficiency     Continue ergocalciferol 50,000 weekly.  Recheck vitamin-D in 6 months with primary care             Return to clinic pawan Avila MD

## 2020-03-02 NOTE — ASSESSMENT & PLAN NOTE
Patient continues to have frequent episodes of palpitations however they last less than 30 sec and are relatively mild now.  She only had 1 severe episode of palpitations and chest discomfort that took her to the ER last month which may have been related to a panic attack as symptoms seemed different than current symptoms.  Given episodically nature I do not think this is related to thyroid dysfunction but will repeat thyroid labs today to definitively rule out.  She did have some reported weight loss that she attributes to stress but this could have been a transient hyperthyroidism.

## 2020-03-02 NOTE — LETTER
March 2, 2020      Maryann Reyes, [place]  [address]  [facility] [address]           [facility] Endocrinology [address] [place]  [address]  [facility] [address]  Phone: [phone]  Fax: [phone]          Patient: Alea Barrett   MR Number: [id number]   YOB: 1990   Date of Visit: 3/2/2020       Dear Maryann Reyes:    Thank you for referring Alea Barrett to me for evaluation. Attached you will find relevant portions of my assessment and plan of care.    If you have questions, please do not hesitate to call me. I look forward to following Alea Barrett along with you.    Sincerely,    Kirill Avila MD    Enclosure  CC:  No Recipients    If you would like to receive this communication electronically, please contact [email or web address] or [phone] to request more information on [employer] Link access.    For providers and/or their staff who would like to refer a patient to [facility], please contact us through our one-stop-shop provider referral line, [facility] , at [phone].    If you feel you have received this communication in error or would no longer like to receive these types of communications, please e-mail [email or web address]

## 2020-03-02 NOTE — ASSESSMENT & PLAN NOTE
Periodic palpitations and tachycardia may be related to stress and anxiety.  If endocrine workup negative she will discuss adjusting medications with her primary care doctor.

## 2020-03-05 ENCOUNTER — PATIENT MESSAGE (OUTPATIENT)
Dept: CARDIOLOGY | Facility: CLINIC | Age: 30
End: 2020-03-05

## 2020-03-06 ENCOUNTER — OFFICE VISIT (OUTPATIENT)
Dept: CARDIOLOGY | Facility: CLINIC | Age: 30
End: 2020-03-06
Payer: COMMERCIAL

## 2020-03-06 VITALS
DIASTOLIC BLOOD PRESSURE: 69 MMHG | WEIGHT: 161.63 LBS | HEIGHT: 63 IN | HEART RATE: 95 BPM | BODY MASS INDEX: 28.64 KG/M2 | OXYGEN SATURATION: 99 % | SYSTOLIC BLOOD PRESSURE: 117 MMHG

## 2020-03-06 DIAGNOSIS — R00.2 PALPITATIONS: ICD-10-CM

## 2020-03-06 DIAGNOSIS — I47.11 INAPPROPRIATE SINUS TACHYCARDIA: Primary | ICD-10-CM

## 2020-03-06 DIAGNOSIS — F41.8 MIXED ANXIETY DEPRESSIVE DISORDER: ICD-10-CM

## 2020-03-06 PROBLEM — F41.9 ANXIETY: Status: ACTIVE | Noted: 2020-03-06

## 2020-03-06 PROCEDURE — 99213 PR OFFICE/OUTPT VISIT, EST, LEVL III, 20-29 MIN: ICD-10-PCS | Mod: S$GLB,,, | Performed by: NURSE PRACTITIONER

## 2020-03-06 PROCEDURE — 3008F PR BODY MASS INDEX (BMI) DOCUMENTED: ICD-10-PCS | Mod: CPTII,S$GLB,, | Performed by: NURSE PRACTITIONER

## 2020-03-06 PROCEDURE — 3008F BODY MASS INDEX DOCD: CPT | Mod: CPTII,S$GLB,, | Performed by: NURSE PRACTITIONER

## 2020-03-06 PROCEDURE — 99999 PR PBB SHADOW E&M-EST. PATIENT-LVL IV: CPT | Mod: PBBFAC,,, | Performed by: NURSE PRACTITIONER

## 2020-03-06 PROCEDURE — 99213 OFFICE O/P EST LOW 20 MIN: CPT | Mod: S$GLB,,, | Performed by: NURSE PRACTITIONER

## 2020-03-06 PROCEDURE — 99999 PR PBB SHADOW E&M-EST. PATIENT-LVL IV: ICD-10-PCS | Mod: PBBFAC,,, | Performed by: NURSE PRACTITIONER

## 2020-03-06 NOTE — LETTER
March 6, 2020      Wes Choi MD  [address]  [facility] [address]           [facility] - Cardiology  [address]  [place] [address]  Phone: [phone]          Patient: Alea Barrett   MR Number: [id number]   YOB: 1990   Date of Visit: 3/6/2020       Dear Dr. Wes Choi:    Thank you for referring Alea Barrett to me for evaluation. Attached you will find relevant portions of my assessment and plan of care.    If you have questions, please do not hesitate to call me. I look forward to following Alea Barrett along with you.    Sincerely,    Maryann Reyes, [place]    Enclosure  CC:  No Recipients    If you would like to receive this communication electronically, please contact [email or web address] or [phone] to request more information on [employer] Link access.    For providers and/or their staff who would like to refer a patient to [facility], please contact us through our one-stop-shop provider referral line, [facility], at [phone].    If you feel you have received this communication in error or would no longer like to receive these types of communications, please e-mail [email or web address]

## 2020-03-06 NOTE — PATIENT INSTRUCTIONS
Thank you for coming to your cardiology appointment today.     Points of care discussed at today's appointment:  - Increase hydration, especially beverages high in electrolytes  - keep up on your salt intake   - It's okay to exercise    If you have any questions or concerns related to your cardiology care, please call [phone].    Thank you,  Maryann Eric HIGGINS, DNP-C  03/06/2020    Understanding Tachycardia    The heart has an electrical system that sends signals to control the heartbeat. Any abnormal change in the speed or pattern of the heartbeat is called an arrhythmia. If you have an arrhythmia that causes the heart to beat faster than normal, this is known as tachycardia. There are many types of tachycardia. They can affect the hearts upper chambers (atria), the hearts lower chambers (ventricles), or both.  What causes tachycardia?  Many things can cause tachycardia, including:  · Damage to heart tissue from heart disease, past heart attack, or heart surgery  · Abnormal electrical pathways in the heart  · Problems with the hearts structure that you are born with   · High blood pressure  · Overactive thyroid  · Use of certain medicines  · Severe blood loss or anemia  · Dehydration  · Severe stress, fear, or anxiety  · Smoking  · Too much alcohol or caffeine  · Abuse of certain street drugs, such as cocaine  · Infections  · Certain inflammatory conditions  · Chronic  pain syndromes  What are the symptoms of tachycardia?  Tachycardia can cause a fast, pounding, or irregular heartbeat. It can also make it harder for the heart to pump blood efficiently to the body. This may cause symptoms such as:  · Shortness of breath  · Tiredness  · Dizziness or fainting  · Chest pain  Some people with tachycardia may have no symptoms at all.  How is tachycardiatreated?  Treatment for tachycardia depends on the cause. It also depends on the type you have and how severe your symptoms are. Tachycardia in the ventricles is  often more serious than in the atria. For this reason, it may need to be treated right away. Possible treatments include:  · Treating the underlying cause. For instance, if a medicine is causing your tachycardia, changing the dosage or stopping the medicine with your doctors guidance may correct the problem.  · Lifestyle changes. These include getting enough sleep and reducing stress. They also include avoiding caffeine, alcohol, tobacco, and street drugs.  · Vagal maneuvers.These are techniques that may help interrupt a fast heartbeat and slow it down. One example is to take a deep breath and bear down hard while holding your breath.   · Medicines. These may be used to help slow down a fast heartbeat. They may also be used to regulate the pattern of the heartbeat.  · Electrical cardioversion. Special pads or paddles are used to send one or more brief  electrical shocks to the heart. This can help restore the heartbeat to normal.  · Ablation. A long thin tube called a catheter is inserted into a blood vessel and threaded to the heart. The catheter sends out hot or cold energy to the areas causing abnormal signals. This destroys the problem tissue or cells. This may stop certain types of tachycardia.  · Pacemaker. This is a device that is placed just under the skin in the chest. It sends paced signals to make the heart beat at a more normal rate and rhythm. You may need this when certain medicines that treat tachycardia also result in a slow heart rate.    · Implantable cardioverter defibrillator (ICD). This is a device that is placed just under the skin in the chest or armpit. The ICD monitors your heart rate. When needed, it sends controlled burst of signals to the heart to overdrive a tachycardia.  It can also send a single stronger shock to the heart to stop a life-threatening type of tachycardia, if needed.  · Surgery. During surgery, different techniques may be used to create scar tissue in the areas of the  heart causing abnormal signals. This may help stop certain types of tachycardia.  What are the complications of tachycardia?  These can include:  · Blood clots or stroke  · Heart failure. With this problem, the heart muscle is so weak it no longer pumps blood well.  · Sudden cardiac arrest. This is when the heart suddenly stops beating.  When should I call my healthcare provider?  Call your healthcare provider right away if you have any of these:  · Symptoms that dont get better with treatment, or get worse  · New symptoms   Date Last Reviewed: 5/1/2016  © 9007-6252 [employer]. [address] [place] [id number]. All rights reserved. This information is not intended as a substitute for professional medical care. Always follow your healthcare professional's instructions.

## 2020-03-06 NOTE — PROGRESS NOTES
Cardiology Consults Clinic Note    Subjective:   Chief Complaint: Tachycardia (2 week f/u ); Palpitations; Shortness of Breath; and Dizziness  Last Clinic Visit:      Problems:  IBS  Depression/anxiety  Tachycardia       History of Present Illness: Alea Barrett is a 30 y.o. female who presents for follow up of episodic palpitations and tachycardia. She was recently seen by endocrine. Work up revealed normal thyroid function, as well as normal aldosterone and cortisol levels. Metanephrines in process, however, low suspicion for pheochromocytoma.   She returns today with reports of ongoing episodes of tachycardia.  She wears a Fitbit which has detected HRs in the 120s-140s.  Reports that episodes of palpitations/tachycardia are occurring multiple times/day, lasting less than a minute.  Feeling more fatigues and reports having a chest tightness with palpitations. Denies shortness of breath or syncope.   She completed at 24 hour Holter in February that was revealing of sinus tachycardia/sinus rhythm with rare ectopy.    Review of labs revealing of normal kidney and thyroid function.     Review of Systems   Constitution: Negative for decreased appetite, fever, malaise/fatigue and weight gain.   HENT: Negative for congestion, hearing loss and nosebleeds.    Eyes: Negative for visual disturbance.   Cardiovascular: Positive for palpitations. Negative for chest pain, dyspnea on exertion, irregular heartbeat, leg swelling and syncope.   Respiratory: Negative for cough, shortness of breath and sleep disturbances due to breathing.    Endocrine: Negative for cold intolerance and heat intolerance.   Hematologic/Lymphatic: Negative for bleeding problem. Does not bruise/bleed easily.   Gastrointestinal: Negative for bloating, abdominal pain, change in bowel habit and heartburn.   Neurological: Negative for dizziness, loss of balance and numbness.   Psychiatric/Behavioral: Negative for altered mental status. The patient is  "not nervous/anxious.        Medications:  Patient's Medications   New Prescriptions    No medications on file   Previous Medications    AMITRIPTYLINE (ELAVIL) 10 MG TABLET    Take 1 tablet (10 mg total) by mouth every evening.    ERGOCALCIFEROL (ERGOCALCIFEROL) 50,000 UNIT CAP    Take 1 capsule (50,000 Units total) by mouth every 7 days.    NORETHINDRONE-ETHINYL ESTRADIOL (MICROGESTIN 1/20) 1-20 MG-MCG PER TABLET    Take 1 tablet by mouth once daily.    VITAMIN D (VITAMIN D3) 1000 UNITS TAB    Take 1,000 Units by mouth once daily.   Modified Medications    No medications on file   Discontinued Medications    No medications on file       Family History:  Alea's family history includes Arthritis in her father, maternal grandmother, mother, and paternal grandmother; Cancer in her paternal grandfather; Diabetes in her paternal grandmother; Heart disease in her maternal grandfather; Hyperlipidemia in her father and paternal grandmother; Hypertension in her father, maternal grandfather, maternal grandmother, mother, and paternal grandmother; Pacemaker/defibrilator in her maternal grandmother.    Social History:  Alea reports that she has never smoked. She has never used smokeless tobacco. She reports that she does not drink alcohol or use drugs.    Objective:   /69 (BP Location: Left arm, Patient Position: Sitting, BP Method: Medium (Automatic))   Pulse 95   Ht 5' 3" (1.6 m)   Wt 73.3 kg (161 lb 9.6 oz)   SpO2 99%   BMI 28.63 kg/m²     Physical Exam   Constitutional: She is oriented to person, place, and time. Vital signs are normal. She appears well-developed and well-nourished.   HENT:   Head: Normocephalic.   Eyes: Pupils are equal, round, and reactive to light.   Neck: Normal range of motion. Neck supple. No JVD present. Carotid bruit is not present.   Cardiovascular: Normal rate, regular rhythm, S1 normal, S2 normal, normal heart sounds and intact distal pulses. PMI is not displaced. Exam reveals no " gallop and no friction rub.   No murmur heard.  Pulses:       Carotid pulses are 2+ on the right side, and 2+ on the left side.       Radial pulses are 2+ on the right side, and 2+ on the left side.        Dorsalis pedis pulses are 2+ on the right side, and 2+ on the left side.        Posterior tibial pulses are 1+ on the right side, and 1+ on the left side.   Pulmonary/Chest: Effort normal and breath sounds normal. No accessory muscle usage. She has no decreased breath sounds. She has no wheezes.   Abdominal: Soft. Normal appearance and bowel sounds are normal. She exhibits no distension, no fluid wave and no ascites. There is no hepatosplenomegaly. There is no tenderness.   Musculoskeletal: Normal range of motion. She exhibits no edema.   Neurological: She is alert and oriented to person, place, and time. She has normal strength.   Skin: Skin is warm, dry and intact. No ecchymosis and no rash noted. No erythema.   Psychiatric: She has a normal mood and affect. Her speech is normal and behavior is normal. Judgment and thought content normal. Cognition and memory are normal.   Vitals reviewed.    EKG:  My independent visualization of most recent EKG is NSR 80 bpm. Normal EKG.     Lipids:  Recent Labs   Lab 11/04/19  0854   LDL Cholesterol 100.0   HDL 74   Cholesterol 186      Renal:  Recent Labs   Lab 03/02/20  0903   Creatinine 0.8   Potassium 4.2   CO2 23   BUN, Bld 6     Liver:  Recent Labs   Lab 11/04/19  0854   AST 18   ALT 17       Assessment:     1. Inappropriate sinus tachycardia    2. Palpitations    3. Mixed anxiety depressive disorder      Plan:     Alea Barrett was seen in clinic today for Tachycardia (2 week f/u ); Palpitations; Shortness of Breath; and Dizziness    Diagnoses and associated orders include:      Inappropriate sinus tachycardia  Comments:  Resting HR 90s. Uncertain origin, however, labs WNL, holter with rare ectopy and no arrhythmias.   Extensive time spent counseling patient. Recommend  avoiding triggers including caffeine, alcohol.   Encouraged to hydrate and continue to practice stress reduction techniques. Encouraged to exercise.     Palpitations    Mixed anxiety depressive disorder        Follow up as needed    This patient was seen and discussed with Dr. Malik HIGGINS DNP  03/06/2020  8:01 AM      Follow-up:     No future appointments.

## 2020-03-17 DIAGNOSIS — E55.9 VITAMIN D DEFICIENCY: ICD-10-CM

## 2020-03-17 RX ORDER — ERGOCALCIFEROL 1.25 MG/1
CAPSULE ORAL
Qty: 4 CAPSULE | Refills: 1 | Status: SHIPPED | OUTPATIENT
Start: 2020-03-17 | End: 2020-04-10

## 2020-04-10 DIAGNOSIS — E55.9 VITAMIN D DEFICIENCY: ICD-10-CM

## 2020-04-10 RX ORDER — ERGOCALCIFEROL 1.25 MG/1
CAPSULE ORAL
Qty: 4 CAPSULE | Refills: 1 | Status: SHIPPED | OUTPATIENT
Start: 2020-04-10 | End: 2021-09-27

## 2020-04-16 ENCOUNTER — PATIENT OUTREACH (OUTPATIENT)
Dept: ADMINISTRATIVE | Facility: HOSPITAL | Age: 30
End: 2020-04-16

## 2020-04-16 NOTE — PROGRESS NOTES
Chart review completed 04/16/2020.  Care Everywhere updates requested and reviewed.  Immunizations reconciled. Media reviewed.        updated with external Pap report.     Health Maintenance Due   Topic Date Due    HIV Screening  01/21/2005    TETANUS VACCINE  07/20/2019    Influenza Vaccine (1) 09/01/2019

## 2020-05-21 NOTE — PROGRESS NOTES
Refill Routing Note    Medication(s) are not appropriate for processing by [facility]:       Medication not active on medication list     Medication-related problems identified:   Dose adjustment  Drug-drug interaction  Medication Therapy Plan: Elavil last ordered for 10 mg dose in 11/19 with plans to wean per OV note; Pt has routinely filled Elavil 25 mg per Epic claims data; Unclear which dose pt should be taking; Defer to you         Automatic Epic Protocol Generated Data:    Requested Prescriptions   Pending Prescriptions Disp Refills    amitriptyline (ELAVIL) 25 MG tablet [Pharmacy Med Name: AMITRIPTYLINE HCL 25 MG TAB] 30 tablet 5     Sig: TAKE 1 TABLET (25 MG TOTAL) BY MOUTH EVERY EVENING.       Psychiatry:  Antidepressants - Heterocyclics (TCAs) Passed - 5/21/2020  1:36 AM        Passed - Patient is at least 18 years old        Passed - Negative Pregnancy Status Check        Passed - Office visit in past 6 months or future 90 days.     Recent Outpatient Visits            2 months ago Inappropriate sinus tachycardia    [facility] - Cardiology Maryann Reyes NP    2 months ago Tachycardia    [facility] - Endocrinology [facility] [place] Kirill Avila MD    2 months ago Tachycardia    [facility] - Cardiology Maryann Reyes NP    3 months ago Tachycardia    [place] - Family Medicine Wes Choi MD    6 months ago Other fatigue    [place] - Family Medicine Makeda Reeder MD                    Powered by [employer] - 5/21/2020  1:36 AM        The requested medication is not on the active medication list.           Appointments  past 12m or future 3m with PCP    Date Provider   Last Visit   11/4/2019 Makeda Reeder MD   Next Visit   Visit date not found Makeda Reeder MD   ED visits in past 90 days: 0     Note composed:4:59 PM 05/21/2020

## 2020-05-22 RX ORDER — AMITRIPTYLINE HYDROCHLORIDE 25 MG/1
25 TABLET, FILM COATED ORAL NIGHTLY
Qty: 30 TABLET | Refills: 5 | Status: SHIPPED | OUTPATIENT
Start: 2020-05-22 | End: 2020-10-21

## 2020-10-21 RX ORDER — AMITRIPTYLINE HYDROCHLORIDE 25 MG/1
TABLET, FILM COATED ORAL
Qty: 30 TABLET | Refills: 1 | Status: SHIPPED | OUTPATIENT
Start: 2020-10-21 | End: 2020-12-30

## 2020-12-30 RX ORDER — AMITRIPTYLINE HYDROCHLORIDE 25 MG/1
TABLET, FILM COATED ORAL
Qty: 30 TABLET | Refills: 0 | Status: SHIPPED | OUTPATIENT
Start: 2020-12-30 | End: 2021-09-27

## 2020-12-30 NOTE — TELEPHONE ENCOUNTER
Care Due:                  Date            Visit Type   Department     Provider  --------------------------------------------------------------------------------                                MYCHART                              FOLLOWUP/OF  [facility]  Last Visit: 11-      FICE VISIT   MEDICINE       AYALA SOLARES  Next Visit: None Scheduled  None         None Found                                                            Last  Test          Frequency    Reason                     Performed    Due Date  --------------------------------------------------------------------------------    Office Visit  12 months..  amitriptyline............  11-   10-    Powered by [employer] Reference number: [id number]. 12/30/2020 12:24:30 AM   CST

## 2020-12-30 NOTE — TELEPHONE ENCOUNTER
Please if you can make her an appointment to see me back, I gave her 30 tablets and no refills, I did not see the patient for more than a year.  Thank you

## 2021-02-19 ENCOUNTER — PATIENT OUTREACH (OUTPATIENT)
Dept: ADMINISTRATIVE | Facility: OTHER | Age: 31
End: 2021-02-19

## 2021-02-19 ENCOUNTER — HOSPITAL ENCOUNTER (OUTPATIENT)
Dept: RADIOLOGY | Facility: HOSPITAL | Age: 31
Discharge: HOME | End: 2021-02-19
Attending: STUDENT IN AN ORGANIZED HEALTH CARE EDUCATION/TRAINING PROGRAM
Payer: COMMERCIAL

## 2021-02-19 ENCOUNTER — OFFICE VISIT (OUTPATIENT)
Dept: RHEUMATOLOGY | Facility: CLINIC | Age: 31
End: 2021-02-19
Payer: COMMERCIAL

## 2021-02-19 VITALS
SYSTOLIC BLOOD PRESSURE: 129 MMHG | HEART RATE: 92 BPM | HEIGHT: 64 IN | DIASTOLIC BLOOD PRESSURE: 77 MMHG | WEIGHT: 174 LBS | BODY MASS INDEX: 29.71 KG/M2

## 2021-02-19 DIAGNOSIS — M54.9 DORSALGIA, UNSPECIFIED: ICD-10-CM

## 2021-02-19 DIAGNOSIS — M25.50 ARTHRALGIA, UNSPECIFIED JOINT: Primary | ICD-10-CM

## 2021-02-19 DIAGNOSIS — M25.50 ARTHRALGIA, UNSPECIFIED JOINT: ICD-10-CM

## 2021-02-19 DIAGNOSIS — F41.9 ANXIETY: ICD-10-CM

## 2021-02-19 DIAGNOSIS — E55.9 VITAMIN D DEFICIENCY: ICD-10-CM

## 2021-02-19 PROCEDURE — 1125F AMNT PAIN NOTED PAIN PRSNT: CPT | Mod: S$GLB,,, | Performed by: STUDENT IN AN ORGANIZED HEALTH CARE EDUCATION/TRAINING PROGRAM

## 2021-02-19 PROCEDURE — 99205 OFFICE O/P NEW HI 60 MIN: CPT | Mod: S$GLB,,, | Performed by: STUDENT IN AN ORGANIZED HEALTH CARE EDUCATION/TRAINING PROGRAM

## 2021-02-19 PROCEDURE — 72100 X-RAY EXAM L-S SPINE 2/3 VWS: CPT | Mod: 26,,, | Performed by: RADIOLOGY

## 2021-02-19 PROCEDURE — 99999 PR PBB SHADOW E&M-EST. PATIENT-LVL III: CPT | Mod: PBBFAC,,, | Performed by: STUDENT IN AN ORGANIZED HEALTH CARE EDUCATION/TRAINING PROGRAM

## 2021-02-19 PROCEDURE — 99205 PR OFFICE/OUTPT VISIT, NEW, LEVL V, 60-74 MIN: ICD-10-PCS | Mod: S$GLB,,, | Performed by: STUDENT IN AN ORGANIZED HEALTH CARE EDUCATION/TRAINING PROGRAM

## 2021-02-19 PROCEDURE — 77077 JOINT SURVEY SINGLE VIEW: CPT | Mod: 26,,, | Performed by: RADIOLOGY

## 2021-02-19 PROCEDURE — 77077 JOINT SURVEY SINGLE VIEW: CPT | Mod: TC

## 2021-02-19 PROCEDURE — 72100 X-RAY EXAM L-S SPINE 2/3 VWS: CPT | Mod: TC

## 2021-02-19 PROCEDURE — 77077 XR ARTHRITIS SURVEY: ICD-10-PCS | Mod: 26,,, | Performed by: RADIOLOGY

## 2021-02-19 PROCEDURE — 73521 X-RAY EXAM HIPS BI 2 VIEWS: CPT | Mod: 26,,, | Performed by: RADIOLOGY

## 2021-02-19 PROCEDURE — 3008F PR BODY MASS INDEX (BMI) DOCUMENTED: ICD-10-PCS | Mod: CPTII,S$GLB,, | Performed by: STUDENT IN AN ORGANIZED HEALTH CARE EDUCATION/TRAINING PROGRAM

## 2021-02-19 PROCEDURE — 72100 XR LUMBAR SPINE AP AND LATERAL: ICD-10-PCS | Mod: 26,,, | Performed by: RADIOLOGY

## 2021-02-19 PROCEDURE — 73521 X-RAY EXAM HIPS BI 2 VIEWS: CPT | Mod: TC

## 2021-02-19 PROCEDURE — 99999 PR PBB SHADOW E&M-EST. PATIENT-LVL III: ICD-10-PCS | Mod: PBBFAC,,, | Performed by: STUDENT IN AN ORGANIZED HEALTH CARE EDUCATION/TRAINING PROGRAM

## 2021-02-19 PROCEDURE — 1125F PR PAIN SEVERITY QUANTIFIED, PAIN PRESENT: ICD-10-PCS | Mod: S$GLB,,, | Performed by: STUDENT IN AN ORGANIZED HEALTH CARE EDUCATION/TRAINING PROGRAM

## 2021-02-19 PROCEDURE — 3008F BODY MASS INDEX DOCD: CPT | Mod: CPTII,S$GLB,, | Performed by: STUDENT IN AN ORGANIZED HEALTH CARE EDUCATION/TRAINING PROGRAM

## 2021-02-19 PROCEDURE — 73521 XR HIPS BILATERAL 2 VIEW INCL AP PELVIS: ICD-10-PCS | Mod: 26,,, | Performed by: RADIOLOGY

## 2021-02-19 RX ORDER — NORELGESTROMIN AND ETHINYL ESTRADIOL 35; 150 UG/MG; UG/MG
1 PATCH TRANSDERMAL WEEKLY
COMMUNITY
End: 2023-05-22

## 2021-02-23 ENCOUNTER — PATIENT MESSAGE (OUTPATIENT)
Dept: RHEUMATOLOGY | Facility: CLINIC | Age: 31
End: 2021-02-23

## 2021-03-15 ENCOUNTER — OFFICE VISIT (OUTPATIENT)
Dept: RHEUMATOLOGY | Facility: CLINIC | Age: 31
End: 2021-03-15
Payer: COMMERCIAL

## 2021-03-15 VITALS
HEIGHT: 64 IN | SYSTOLIC BLOOD PRESSURE: 132 MMHG | WEIGHT: 164 LBS | BODY MASS INDEX: 28 KG/M2 | HEART RATE: 97 BPM | DIASTOLIC BLOOD PRESSURE: 79 MMHG

## 2021-03-15 DIAGNOSIS — G47.00 INSOMNIA, UNSPECIFIED TYPE: ICD-10-CM

## 2021-03-15 DIAGNOSIS — M79.7 FIBROMYALGIA: Primary | ICD-10-CM

## 2021-03-15 DIAGNOSIS — F41.9 ANXIETY: ICD-10-CM

## 2021-03-15 DIAGNOSIS — M25.50 ARTHRALGIA, UNSPECIFIED JOINT: ICD-10-CM

## 2021-03-15 DIAGNOSIS — E55.9 VITAMIN D DEFICIENCY: ICD-10-CM

## 2021-03-15 PROCEDURE — 99999 PR PBB SHADOW E&M-EST. PATIENT-LVL III: ICD-10-PCS | Mod: PBBFAC,,, | Performed by: STUDENT IN AN ORGANIZED HEALTH CARE EDUCATION/TRAINING PROGRAM

## 2021-03-15 PROCEDURE — 3008F PR BODY MASS INDEX (BMI) DOCUMENTED: ICD-10-PCS | Mod: CPTII,S$GLB,, | Performed by: STUDENT IN AN ORGANIZED HEALTH CARE EDUCATION/TRAINING PROGRAM

## 2021-03-15 PROCEDURE — 99999 PR PBB SHADOW E&M-EST. PATIENT-LVL III: CPT | Mod: PBBFAC,,, | Performed by: STUDENT IN AN ORGANIZED HEALTH CARE EDUCATION/TRAINING PROGRAM

## 2021-03-15 PROCEDURE — 1125F PR PAIN SEVERITY QUANTIFIED, PAIN PRESENT: ICD-10-PCS | Mod: S$GLB,,, | Performed by: STUDENT IN AN ORGANIZED HEALTH CARE EDUCATION/TRAINING PROGRAM

## 2021-03-15 PROCEDURE — 1125F AMNT PAIN NOTED PAIN PRSNT: CPT | Mod: S$GLB,,, | Performed by: STUDENT IN AN ORGANIZED HEALTH CARE EDUCATION/TRAINING PROGRAM

## 2021-03-15 PROCEDURE — 99214 OFFICE O/P EST MOD 30 MIN: CPT | Mod: S$GLB,,, | Performed by: STUDENT IN AN ORGANIZED HEALTH CARE EDUCATION/TRAINING PROGRAM

## 2021-03-15 PROCEDURE — 99214 PR OFFICE/OUTPT VISIT, EST, LEVL IV, 30-39 MIN: ICD-10-PCS | Mod: S$GLB,,, | Performed by: STUDENT IN AN ORGANIZED HEALTH CARE EDUCATION/TRAINING PROGRAM

## 2021-03-15 PROCEDURE — 3008F BODY MASS INDEX DOCD: CPT | Mod: CPTII,S$GLB,, | Performed by: STUDENT IN AN ORGANIZED HEALTH CARE EDUCATION/TRAINING PROGRAM

## 2021-03-15 RX ORDER — TRAZODONE HYDROCHLORIDE 50 MG/1
50 TABLET ORAL NIGHTLY
Qty: 30 TABLET | Refills: 5 | Status: SHIPPED | OUTPATIENT
Start: 2021-03-15 | End: 2022-02-28

## 2021-04-29 ENCOUNTER — PATIENT MESSAGE (OUTPATIENT)
Dept: RESEARCH | Facility: HOSPITAL | Age: 31
End: 2021-04-29

## 2021-07-07 ENCOUNTER — PATIENT MESSAGE (OUTPATIENT)
Dept: ADMINISTRATIVE | Facility: HOSPITAL | Age: 31
End: 2021-07-07

## 2021-09-27 ENCOUNTER — OFFICE VISIT (OUTPATIENT)
Dept: FAMILY MEDICINE | Facility: CLINIC | Age: 31
End: 2021-09-27
Payer: COMMERCIAL

## 2021-09-27 ENCOUNTER — HOSPITAL ENCOUNTER (OUTPATIENT)
Dept: RADIOLOGY | Facility: HOSPITAL | Age: 31
Discharge: HOME | End: 2021-09-27
Attending: FAMILY MEDICINE
Payer: COMMERCIAL

## 2021-09-27 VITALS
OXYGEN SATURATION: 96 % | DIASTOLIC BLOOD PRESSURE: 78 MMHG | HEIGHT: 63 IN | WEIGHT: 167.31 LBS | SYSTOLIC BLOOD PRESSURE: 118 MMHG | HEART RATE: 74 BPM | BODY MASS INDEX: 29.64 KG/M2

## 2021-09-27 DIAGNOSIS — M71.331 SYNOVIAL CYST OF RIGHT WRIST: ICD-10-CM

## 2021-09-27 DIAGNOSIS — M25.531 RIGHT WRIST PAIN: ICD-10-CM

## 2021-09-27 DIAGNOSIS — Z00.01 ANNUAL VISIT FOR GENERAL ADULT MEDICAL EXAMINATION WITH ABNORMAL FINDINGS: Primary | ICD-10-CM

## 2021-09-27 PROCEDURE — 1159F MED LIST DOCD IN RCRD: CPT | Mod: CPTII,S$GLB,, | Performed by: FAMILY MEDICINE

## 2021-09-27 PROCEDURE — 73100 X-RAY EXAM OF WRIST: CPT | Mod: TC,FY,PO,RT

## 2021-09-27 PROCEDURE — 73100 X-RAY EXAM OF WRIST: CPT | Mod: 26,RT,, | Performed by: RADIOLOGY

## 2021-09-27 PROCEDURE — 3074F PR MOST RECENT SYSTOLIC BLOOD PRESSURE < 130 MM HG: ICD-10-PCS | Mod: CPTII,S$GLB,, | Performed by: FAMILY MEDICINE

## 2021-09-27 PROCEDURE — 99999 PR PBB SHADOW E&M-EST. PATIENT-LVL IV: CPT | Mod: PBBFAC,,, | Performed by: FAMILY MEDICINE

## 2021-09-27 PROCEDURE — 1160F PR REVIEW ALL MEDS BY PRESCRIBER/CLIN PHARMACIST DOCUMENTED: ICD-10-PCS | Mod: CPTII,S$GLB,, | Performed by: FAMILY MEDICINE

## 2021-09-27 PROCEDURE — 90686 IIV4 VACC NO PRSV 0.5 ML IM: CPT | Mod: S$GLB,,, | Performed by: FAMILY MEDICINE

## 2021-09-27 PROCEDURE — 99395 PR PREVENTIVE VISIT,EST,18-39: ICD-10-PCS | Mod: 25,S$GLB,, | Performed by: FAMILY MEDICINE

## 2021-09-27 PROCEDURE — 3078F DIAST BP <80 MM HG: CPT | Mod: CPTII,S$GLB,, | Performed by: FAMILY MEDICINE

## 2021-09-27 PROCEDURE — 90686 FLU VACCINE (QUAD) GREATER THAN OR EQUAL TO 3YO PRESERVATIVE FREE IM: ICD-10-PCS | Mod: S$GLB,,, | Performed by: FAMILY MEDICINE

## 2021-09-27 PROCEDURE — 73100 XR WRIST 2 VIEW RIGHT: ICD-10-PCS | Mod: 26,RT,, | Performed by: RADIOLOGY

## 2021-09-27 PROCEDURE — 3078F PR MOST RECENT DIASTOLIC BLOOD PRESSURE < 80 MM HG: ICD-10-PCS | Mod: CPTII,S$GLB,, | Performed by: FAMILY MEDICINE

## 2021-09-27 PROCEDURE — 90471 FLU VACCINE (QUAD) GREATER THAN OR EQUAL TO 3YO PRESERVATIVE FREE IM: ICD-10-PCS | Mod: S$GLB,,, | Performed by: FAMILY MEDICINE

## 2021-09-27 PROCEDURE — 99999 PR PBB SHADOW E&M-EST. PATIENT-LVL IV: ICD-10-PCS | Mod: PBBFAC,,, | Performed by: FAMILY MEDICINE

## 2021-09-27 PROCEDURE — 3074F SYST BP LT 130 MM HG: CPT | Mod: CPTII,S$GLB,, | Performed by: FAMILY MEDICINE

## 2021-09-27 PROCEDURE — 3008F PR BODY MASS INDEX (BMI) DOCUMENTED: ICD-10-PCS | Mod: CPTII,S$GLB,, | Performed by: FAMILY MEDICINE

## 2021-09-27 PROCEDURE — 1160F RVW MEDS BY RX/DR IN RCRD: CPT | Mod: CPTII,S$GLB,, | Performed by: FAMILY MEDICINE

## 2021-09-27 PROCEDURE — 99395 PREV VISIT EST AGE 18-39: CPT | Mod: 25,S$GLB,, | Performed by: FAMILY MEDICINE

## 2021-09-27 PROCEDURE — 1159F PR MEDICATION LIST DOCUMENTED IN MEDICAL RECORD: ICD-10-PCS | Mod: CPTII,S$GLB,, | Performed by: FAMILY MEDICINE

## 2021-09-27 PROCEDURE — 3008F BODY MASS INDEX DOCD: CPT | Mod: CPTII,S$GLB,, | Performed by: FAMILY MEDICINE

## 2021-09-27 PROCEDURE — 90471 IMMUNIZATION ADMIN: CPT | Mod: S$GLB,,, | Performed by: FAMILY MEDICINE

## 2021-09-29 ENCOUNTER — OFFICE VISIT (OUTPATIENT)
Dept: ORTHOPEDICS | Facility: CLINIC | Age: 31
End: 2021-09-29
Payer: COMMERCIAL

## 2021-09-29 VITALS
SYSTOLIC BLOOD PRESSURE: 135 MMHG | DIASTOLIC BLOOD PRESSURE: 88 MMHG | HEIGHT: 63 IN | WEIGHT: 167 LBS | HEART RATE: 90 BPM | BODY MASS INDEX: 29.59 KG/M2

## 2021-09-29 DIAGNOSIS — M71.331 SYNOVIAL CYST OF RIGHT WRIST: ICD-10-CM

## 2021-09-29 DIAGNOSIS — M25.531 RIGHT WRIST PAIN: ICD-10-CM

## 2021-09-29 PROCEDURE — 1159F PR MEDICATION LIST DOCUMENTED IN MEDICAL RECORD: ICD-10-PCS | Mod: CPTII,S$GLB,, | Performed by: ORTHOPAEDIC SURGERY

## 2021-09-29 PROCEDURE — 1160F RVW MEDS BY RX/DR IN RCRD: CPT | Mod: CPTII,S$GLB,, | Performed by: ORTHOPAEDIC SURGERY

## 2021-09-29 PROCEDURE — 99203 OFFICE O/P NEW LOW 30 MIN: CPT | Mod: 25,S$GLB,, | Performed by: ORTHOPAEDIC SURGERY

## 2021-09-29 PROCEDURE — 3075F PR MOST RECENT SYSTOLIC BLOOD PRESS GE 130-139MM HG: ICD-10-PCS | Mod: CPTII,S$GLB,, | Performed by: ORTHOPAEDIC SURGERY

## 2021-09-29 PROCEDURE — 3079F DIAST BP 80-89 MM HG: CPT | Mod: CPTII,S$GLB,, | Performed by: ORTHOPAEDIC SURGERY

## 2021-09-29 PROCEDURE — 1159F MED LIST DOCD IN RCRD: CPT | Mod: CPTII,S$GLB,, | Performed by: ORTHOPAEDIC SURGERY

## 2021-09-29 PROCEDURE — 99203 PR OFFICE/OUTPT VISIT, NEW, LEVL III, 30-44 MIN: ICD-10-PCS | Mod: 25,S$GLB,, | Performed by: ORTHOPAEDIC SURGERY

## 2021-09-29 PROCEDURE — 20612 GANGLION CYST: ICD-10-PCS | Mod: RT,S$GLB,, | Performed by: ORTHOPAEDIC SURGERY

## 2021-09-29 PROCEDURE — 3008F BODY MASS INDEX DOCD: CPT | Mod: CPTII,S$GLB,, | Performed by: ORTHOPAEDIC SURGERY

## 2021-09-29 PROCEDURE — 20612 ASPIRATE/INJ GANGLION CYST: CPT | Mod: RT,S$GLB,, | Performed by: ORTHOPAEDIC SURGERY

## 2021-09-29 PROCEDURE — 3075F SYST BP GE 130 - 139MM HG: CPT | Mod: CPTII,S$GLB,, | Performed by: ORTHOPAEDIC SURGERY

## 2021-09-29 PROCEDURE — 99999 PR PBB SHADOW E&M-EST. PATIENT-LVL III: CPT | Mod: PBBFAC,,, | Performed by: ORTHOPAEDIC SURGERY

## 2021-09-29 PROCEDURE — 3008F PR BODY MASS INDEX (BMI) DOCUMENTED: ICD-10-PCS | Mod: CPTII,S$GLB,, | Performed by: ORTHOPAEDIC SURGERY

## 2021-09-29 PROCEDURE — 99999 PR PBB SHADOW E&M-EST. PATIENT-LVL III: ICD-10-PCS | Mod: PBBFAC,,, | Performed by: ORTHOPAEDIC SURGERY

## 2021-09-29 PROCEDURE — 3079F PR MOST RECENT DIASTOLIC BLOOD PRESSURE 80-89 MM HG: ICD-10-PCS | Mod: CPTII,S$GLB,, | Performed by: ORTHOPAEDIC SURGERY

## 2021-09-29 PROCEDURE — 1160F PR REVIEW ALL MEDS BY PRESCRIBER/CLIN PHARMACIST DOCUMENTED: ICD-10-PCS | Mod: CPTII,S$GLB,, | Performed by: ORTHOPAEDIC SURGERY

## 2021-11-04 ENCOUNTER — IMMUNIZATION (OUTPATIENT)
Dept: PRIMARY CARE CLINIC | Facility: CLINIC | Age: 31
End: 2021-11-04
Payer: COMMERCIAL

## 2021-11-04 DIAGNOSIS — Z23 NEED FOR VACCINATION: Primary | ICD-10-CM

## 2021-11-04 PROCEDURE — 91300 COVID-19, MRNA, LNP-S, PF, 30 MCG/0.3 ML DOSE VACCINE: CPT | Mod: S$GLB,,, | Performed by: FAMILY MEDICINE

## 2021-11-04 PROCEDURE — 91300 COVID-19, MRNA, LNP-S, PF, 30 MCG/0.3 ML DOSE VACCINE: ICD-10-PCS | Mod: S$GLB,,, | Performed by: FAMILY MEDICINE

## 2022-02-12 ENCOUNTER — PATIENT MESSAGE (OUTPATIENT)
Dept: ADMINISTRATIVE | Facility: OTHER | Age: 32
End: 2022-02-12
Payer: COMMERCIAL

## 2022-02-15 ENCOUNTER — OFFICE VISIT (OUTPATIENT)
Dept: URGENT CARE | Facility: CLINIC | Age: 32
End: 2022-02-15
Payer: COMMERCIAL

## 2022-02-15 VITALS
HEIGHT: 63 IN | WEIGHT: 168 LBS | HEART RATE: 111 BPM | DIASTOLIC BLOOD PRESSURE: 86 MMHG | RESPIRATION RATE: 18 BRPM | SYSTOLIC BLOOD PRESSURE: 129 MMHG | TEMPERATURE: 98 F | OXYGEN SATURATION: 99 % | BODY MASS INDEX: 29.77 KG/M2

## 2022-02-15 DIAGNOSIS — U07.1 COVID-19 VIRUS DETECTED: Primary | ICD-10-CM

## 2022-02-15 DIAGNOSIS — R53.83 FATIGUE, UNSPECIFIED TYPE: ICD-10-CM

## 2022-02-15 DIAGNOSIS — R06.09 DYSPNEA ON EXERTION: ICD-10-CM

## 2022-02-15 DIAGNOSIS — R11.0 NAUSEA: ICD-10-CM

## 2022-02-15 DIAGNOSIS — R05.9 COUGH: ICD-10-CM

## 2022-02-15 LAB
CTP QC/QA: YES
SARS-COV-2 AG RESP QL IA.RAPID: POSITIVE

## 2022-02-15 PROCEDURE — 3074F SYST BP LT 130 MM HG: CPT | Mod: CPTII,S$GLB,, | Performed by: NURSE PRACTITIONER

## 2022-02-15 PROCEDURE — 99214 OFFICE O/P EST MOD 30 MIN: CPT | Mod: S$GLB,,, | Performed by: NURSE PRACTITIONER

## 2022-02-15 PROCEDURE — 1159F MED LIST DOCD IN RCRD: CPT | Mod: CPTII,S$GLB,, | Performed by: NURSE PRACTITIONER

## 2022-02-15 PROCEDURE — 1159F PR MEDICATION LIST DOCUMENTED IN MEDICAL RECORD: ICD-10-PCS | Mod: CPTII,S$GLB,, | Performed by: NURSE PRACTITIONER

## 2022-02-15 PROCEDURE — 87811 SARS-COV-2 COVID19 W/OPTIC: CPT | Mod: S$GLB,,, | Performed by: NURSE PRACTITIONER

## 2022-02-15 PROCEDURE — 3008F BODY MASS INDEX DOCD: CPT | Mod: CPTII,S$GLB,, | Performed by: NURSE PRACTITIONER

## 2022-02-15 PROCEDURE — 1160F RVW MEDS BY RX/DR IN RCRD: CPT | Mod: CPTII,S$GLB,, | Performed by: NURSE PRACTITIONER

## 2022-02-15 PROCEDURE — 3079F PR MOST RECENT DIASTOLIC BLOOD PRESSURE 80-89 MM HG: ICD-10-PCS | Mod: CPTII,S$GLB,, | Performed by: NURSE PRACTITIONER

## 2022-02-15 PROCEDURE — 1160F PR REVIEW ALL MEDS BY PRESCRIBER/CLIN PHARMACIST DOCUMENTED: ICD-10-PCS | Mod: CPTII,S$GLB,, | Performed by: NURSE PRACTITIONER

## 2022-02-15 PROCEDURE — 3079F DIAST BP 80-89 MM HG: CPT | Mod: CPTII,S$GLB,, | Performed by: NURSE PRACTITIONER

## 2022-02-15 PROCEDURE — 3008F PR BODY MASS INDEX (BMI) DOCUMENTED: ICD-10-PCS | Mod: CPTII,S$GLB,, | Performed by: NURSE PRACTITIONER

## 2022-02-15 PROCEDURE — 99214 PR OFFICE/OUTPT VISIT, EST, LEVL IV, 30-39 MIN: ICD-10-PCS | Mod: S$GLB,,, | Performed by: NURSE PRACTITIONER

## 2022-02-15 PROCEDURE — 87811 SARS CORONAVIRUS 2 ANTIGEN POCT, MANUAL READ: ICD-10-PCS | Mod: S$GLB,,, | Performed by: NURSE PRACTITIONER

## 2022-02-15 PROCEDURE — 3074F PR MOST RECENT SYSTOLIC BLOOD PRESSURE < 130 MM HG: ICD-10-PCS | Mod: CPTII,S$GLB,, | Performed by: NURSE PRACTITIONER

## 2022-02-15 RX ORDER — FLUTICASONE PROPIONATE 50 MCG
1 SPRAY, SUSPENSION (ML) NASAL DAILY
Qty: 15.8 ML | Refills: 0 | Status: SHIPPED | OUTPATIENT
Start: 2022-02-15 | End: 2023-05-22

## 2022-02-15 RX ORDER — CETIRIZINE HYDROCHLORIDE 10 MG/1
10 TABLET ORAL DAILY
Qty: 30 TABLET | Refills: 0 | Status: SHIPPED | OUTPATIENT
Start: 2022-02-15 | End: 2023-05-22

## 2022-02-15 RX ORDER — ONDANSETRON 4 MG/1
4 TABLET, ORALLY DISINTEGRATING ORAL EVERY 8 HOURS PRN
Qty: 20 TABLET | Refills: 0 | Status: SHIPPED | OUTPATIENT
Start: 2022-02-15 | End: 2022-02-28

## 2022-02-15 RX ORDER — BENZONATATE 100 MG/1
100 CAPSULE ORAL 3 TIMES DAILY PRN
Qty: 30 CAPSULE | Refills: 0 | Status: SHIPPED | OUTPATIENT
Start: 2022-02-15 | End: 2022-02-25

## 2022-02-15 RX ORDER — PROMETHAZINE HYDROCHLORIDE AND DEXTROMETHORPHAN HYDROBROMIDE 6.25; 15 MG/5ML; MG/5ML
5 SYRUP ORAL EVERY 4 HOURS PRN
Qty: 118 ML | Refills: 0 | Status: SHIPPED | OUTPATIENT
Start: 2022-02-15 | End: 2022-02-25

## 2022-02-15 RX ORDER — ALBUTEROL SULFATE 90 UG/1
2 AEROSOL, METERED RESPIRATORY (INHALATION) EVERY 6 HOURS PRN
Qty: 18 G | Refills: 0 | Status: SHIPPED | OUTPATIENT
Start: 2022-02-15 | End: 2023-10-16

## 2022-02-15 NOTE — PATIENT INSTRUCTIONS
Patient Education       COVID-19 Discharge Instructions   About this topic   Coronavirus disease 2019 is also known as COVID-19. It is a viral illness that infects the lungs. It is caused by a virus called SARS-associated coronavirus (SARS-CoV-2).  The signs of COVID-19 most often start a few days after you have been infected. In some people, it takes longer to show signs. Others never show signs of the infection. You may have a cough, fever, shaking chills and it may be hard to breathe. You may be very tired, have muscle aches, a headache or sore throat. Some people have an upset stomach or loose stools. Others lose their sense of smell or taste. You may not have these signs all the time and they may come and go while you are sick.  The virus spreads easily through droplets when you talk, sneeze, or cough. You can pass the virus to others when you are talking close together, singing, hugging, sharing food, or shaking hands. Doctors believe the germs also survive on surfaces like tables, door handles, and telephones. However, this is not a common way that COVID-19 spreads. Doctors believe you can also spread the infection even if you dont have any symptoms, but they do not know how that happens. This is why getting vaccinated is one of the best ways to keep you healthy and slow the spread of the virus.  Some people have a mild case of COVID-19 and are able to stay at home and away from others until they feel better. Others may need to be in the hospital if they are very sick. Some people with COVID-19 can have some symptoms for weeks or months. People with COVID-19 must isolate themselves. You can start to be around others when your doctor says it is safe to do so.       What care is needed at home?   · Ask your doctor what you need to do when you go home. Make sure you ask questions if you do not understand what the doctor says.  · Drink lots of water, juice, or broth to replace fluids lost from a fever.  · You  may use cool mist humidifiers to help ease congestion and coughing.  · Use 2 to 3 pillows to prop yourself up when you lie down to make it easier to breathe and sleep.  · Do not smoke and do not drink beer, wine, and mixed drinks (alcohol).  · To lower the chance of passing the infection to others, get a COVID-19 vaccine after your infection has resolved.  · If you have not been fully vaccinated:  ? Wear a mask over your mouth and nose if you are around others who are not sick. Cloth masks work best if they have more than one layer of fabric.  ? Wash your hands often.  ? Stay home in a separate room, if possible, away from others. Only go out to get medical care.  ? Use a separate bathroom if possible.  ? Do not make food for others.  What follow-up care is needed?   · Your doctor may ask you to make visits to the office to check on your progress. Be sure to keep these visits. Make sure you wear a mask at these visits.  · If you can, tell the staff you have COVID-19 ahead of time so they can take extra care to stop the disease from spreading.  · It may take a few weeks before your health returns to normal.  What drugs may be needed?   The doctor may order drugs to:  · Help with breathing  · Help with fever  · Help with swelling in your airways and lungs  · Control coughing  · Ease a sore throat  · Help a runny or stuffy nose  Will physical activity be limited?   You may have to limit your physical activity. Talk to your doctor about the right amount of activity for you. If you have been very sick with COVID-19, it can take some time to get your strength back.  Will there be any other care needed?   Doctors do not know how long you can pass the virus on to others after you are sick. This is why it is important to stay in a separate room, if possible, when you are sick. For now, doctors are giving general guidelines for you to follow after you have been sick. Before you go around other people, you should:  · Be fever  free for 24 hours without taking any drugs to lower the fever  · Have no symptoms of cough or shortness of breath  · Wait at least 10 days after first having symptoms or your first positive test, and you need to be symptom free as above. Some experts suggest waiting 20 days if you have had a more severe infection.  Talk with your doctor about getting a COVID-19 vaccine.  What problems could happen?   · Fluid loss. This is dehydration.  · Short-term or long-term lung damage  · Heart problems  · Death  When do I need to call the doctor?   · You are having so much trouble breathing that you can only say one or two words at a time.  · You need to sit upright at all times to be able to breathe and/or cannot lie down.  · You are very confused or cannot stay awake.  · Your lips or skin start to turn blue or grey.  · You think you might be having a medical emergency. Some examples of medical emergencies are:  ? Severe chest pain.  ? Not able to speak or move normally.  · You have trouble breathing when talking or sitting still.  · You have new shortness of breath.  · You become weak or dizzy.  · You have very dark urine or do not pass urine for more than 8 hours.  · You have new or worsening COVID-19 symptoms like:  ? Fever  ? Cough  ? Feeling very tired  ? Shaking chills  ? Headache  ? Trouble swallowing  ? Throwing up  ? Loose stools  ? Reddish purple spots on your fingers or toes  Teach Back: Helping You Understand   The Teach Back Method helps you understand the information we are giving you. After you talk with the staff, tell them in your own words what you learned. This helps to make sure the staff has described each thing clearly. It also helps to explain things that may have been confusing. Before going home, make sure you can do these:  · I can tell you about my condition.  · I can tell you what may help ease my breathing.  · I can tell you what I can do to help avoid passing the infection to others.  · I can tell  you what I will do if I have trouble breathing; feel sleepy or confused; or my fingertips, fingernails, skin, or lips are blue.  Where can I learn more?   Centers for Disease Control and Prevention  https://www.cdc.gov/coronavirus/2019-ncov/about/index.html   Centers for Disease Control and Prevention  https://www.cdc.gov/coronavirus/2019-ncov/hcp/disposition-in-home-patients.html   World Health Organization  https://www.who.int/news-room/q-a-detail/a-m-gjtaygkzsdshx   Last Reviewed Date   2021-10-05  Consumer Information Use and Disclaimer   This information is not specific medical advice and does not replace information you receive from your health care provider. This is only a brief summary of general information. It does NOT include all information about conditions, illnesses, injuries, tests, procedures, treatments, therapies, discharge instructions or life-style choices that may apply to you. You must talk with your health care provider for complete information about your health and treatment options. This information should not be used to decide whether or not to accept your health care providers advice, instructions or recommendations. Only your health care provider has the knowledge and training to provide advice that is right for you.  Copyright   Copyright © 2021 [employer] and its affiliates and/or licensors. All rights reserved.  Symptomatic treatment to include:    Rest, increase fluid intake to include electrolyte replacement  Ibuprofen/Tylenol as directed for fever, sore throat, body aches  Zrytec and flonase for sinus symptoms  Phenergan cough syrup at night for cough  Tessalon perles cough pills as needed day or night  Mucinex D over the counter as directed for sinus congestion.  Coricidin HBP if you have high blood pressure.  Zofran as directed for nausea/vomiting.  Warm, salt water gargles, over the counter throat lozenges or sprays as desires.   Imodium over the counter as directed for  diarrhea.  ER for difficulty breathing not relieved by rest, excessive lethargy and/or change in mental status  Albuterol inhaler (if prescribed) for chest tightness, shortness or breath, wheezing, or tight/wheezing cough especially when brought on with deep breath.  Follow CDC isolation guidelines as provided  Patient Instructions   POSITIVE COVID TEST      You have tested positive for COVID-19 today.  Please note that patients who test positive for COVID-19 are required by the CDC to undergo isolation for 5 days, then wearing a mask around others for an additional 5 days, after their symptoms first began following the new updated guidelines of 12/27/2021. This isolation starts from the day you first developed symptoms, not the day of your positive test. For example, if your symptoms began on a Monday but tested positive on the following Wednesday, your 5-day isolation begins from that Monday, not the Wednesday you tested positive.  However, if you are asymptomatic (a person who does not have any symptoms) and COVID-19 positive, your 5-day isolation begins on the day you tested positive, regardless of exposure date.  Also, per the CDC guidelines, once your 5 days have passed, symptoms have resolved or are improving, and you have not had fever greater than 100.4F in the last 24 hours without taking any fever reducers such as Tylenol (Acetaminophen) or Motrin (Ibuprofen), you may return to your normal activities including social distancing, wearing masks, and frequent handwashing - YOU DO NOT NEED ANOTHER TEST IN ORDER TO END YOUR QUARANTINE.

## 2022-02-15 NOTE — PROGRESS NOTES
"Subjective:       Patient ID: Alea Barrett is a 32 y.o. female.    Vitals:  height is 5' 3" (1.6 m) and weight is 76.2 kg (168 lb). Her oral temperature is 98.1 °F (36.7 °C). Her blood pressure is 129/86 and her pulse is 111 (abnormal). Her respiration is 18 and oxygen saturation is 99%.     Chief Complaint: COVID-19 Concerns    Pt states "she tested positive for covid on Saturday with an at home covid test and now she is having trouble breathing. Some of her other symptoms include sneezing, coughing, sore throat, and headaches."       Constitution: Positive for appetite change, fatigue and generalized weakness.   HENT: Positive for congestion and sore throat.    Cardiovascular: Positive for sob on exertion.   Respiratory: Positive for cough.    Gastrointestinal: Negative for nausea, vomiting and diarrhea.   Musculoskeletal: Positive for muscle ache.   Neurological: Negative for headaches.       Objective:      Physical Exam   Constitutional: She is oriented to person, place, and time.   HENT:   Head: Normocephalic and atraumatic.   Ears:   Right Ear: Tympanic membrane normal.   Left Ear: Tympanic membrane normal.   Nose: Congestion present.   Mouth/Throat: Posterior oropharyngeal erythema present.   Cardiovascular: Normal rate, regular rhythm and normal pulses.   Pulmonary/Chest: Effort normal and breath sounds normal.   Abdominal: Normal appearance.   Musculoskeletal: Normal range of motion.         General: Normal range of motion.   Neurological: She is alert and oriented to person, place, and time.   Skin: Skin is warm.   Psychiatric: Her behavior is normal. Mood normal.   Nursing note and vitals reviewed.        Assessment:       1. COVID-19 virus detected    2. Fatigue, unspecified type    3. Cough    4. Dyspnea on exertion    5. Nausea      Covid antigen Positive    Plan:         COVID-19 virus detected    Fatigue, unspecified type    Cough  -     SARS Coronavirus 2 Antigen, POCT Manual Read  -     " benzonatate (TESSALON) 100 MG capsule; Take 1 capsule (100 mg total) by mouth 3 (three) times daily as needed for Cough.  Dispense: 30 capsule; Refill: 0  -     promethazine-dextromethorphan (PROMETHAZINE-DM) 6.25-15 mg/5 mL Syrp; Take 5 mLs by mouth every 4 (four) hours as needed (cough).  Dispense: 118 mL; Refill: 0    Dyspnea on exertion  -     albuterol (VENTOLIN HFA) 90 mcg/actuation inhaler; Inhale 2 puffs into the lungs every 6 (six) hours as needed for Wheezing. Rescue  Dispense: 18 g; Refill: 0  -     fluticasone propionate (FLONASE) 50 mcg/actuation nasal spray; 1 spray (50 mcg total) by Each Nostril route once daily.  Dispense: 15.8 mL; Refill: 0  -     cetirizine (ZYRTEC) 10 MG tablet; Take 1 tablet (10 mg total) by mouth once daily.  Dispense: 30 tablet; Refill: 0    Nausea  -     ondansetron (ZOFRAN-ODT) 4 MG TbDL; Take 1 tablet (4 mg total) by mouth every 8 (eight) hours as needed (nausea, vomiting).  Dispense: 20 tablet; Refill: 0    Rest, increase fluid intake to include electrolyte replacement  Ibuprofen/Tylenol as directed for fever, sore throat, body aches  Zrytec and flonase for sinus symptoms  Phenergan cough syrup at night for cough  Tessalon perles cough pills as needed day or night  Mucinex D over the counter as directed for sinus congestion.  Coricidin HBP if you have high blood pressure.  Zofran as directed for nausea/vomiting.  Warm, salt water gargles, over the counter throat lozenges or sprays as desires.   Imodium over the counter as directed for diarrhea.  ER for difficulty breathing not relieved by rest, excessive lethargy and/or change in mental status  Albuterol inhaler (if prescribed) for chest tightness, shortness or breath, wheezing, or tight/wheezing cough especially when brought on with deep breath.  Follow CDC isolation guidelines as provided  Patient Instructions   POSITIVE COVID TEST      You have tested positive for COVID-19 today.  Please note that patients who test  positive for COVID-19 are required by the CDC to undergo isolation for 5 days, then wearing a mask around others for an additional 5 days, after their symptoms first began following the new updated guidelines of 12/27/2021. This isolation starts from the day you first developed symptoms, not the day of your positive test. For example, if your symptoms began on a Monday but tested positive on the following Wednesday, your 5-day isolation begins from that Monday, not the Wednesday you tested positive.  However, if you are asymptomatic (a person who does not have any symptoms) and COVID-19 positive, your 5-day isolation begins on the day you tested positive, regardless of exposure date.  Also, per the CDC guidelines, once your 5 days have passed, symptoms have resolved or are improving, and you have not had fever greater than 100.4F in the last 24 hours without taking any fever reducers such as Tylenol (Acetaminophen) or Motrin (Ibuprofen), you may return to your normal activities including social distancing, wearing masks, and frequent handwashing - YOU DO NOT NEED ANOTHER TEST IN ORDER TO END YOUR QUARANTINE.     Follow up if symptoms persist or worsen.

## 2022-02-15 NOTE — LETTER
February 15, 2022      [facility]  [address]  [facility] [address]  Phone: [phone]       Patient: Alea Barrett   YOB: 1990  Date of Visit: 02/15/2022    To Whom It May Concern:    Matias Barrett  was at [facility] on 02/15/2022. The patient may return to work/school when her symptoms improve and she is fever free for 24 hours. If you have any questions or concerns, or if I can be of further assistance, please do not hesitate to contact me.    Sincerely,    Lisette Molina, NP

## 2022-02-16 ENCOUNTER — NURSE TRIAGE (OUTPATIENT)
Dept: ADMINISTRATIVE | Facility: CLINIC | Age: 32
End: 2022-02-16
Payer: COMMERCIAL

## 2022-02-17 NOTE — TELEPHONE ENCOUNTER
pts mother calling wanting to know what the rule is about needing a negative test after testing positive for COVID. Informed her based on CDC guidelines. Verbalized understanding. Will route message.     Reason for Disposition   COVID-19 Testing, questions about    Protocols used: CORONAVIRUS (COVID-19) DIAGNOSED OR [id number]

## 2022-02-28 ENCOUNTER — OFFICE VISIT (OUTPATIENT)
Dept: FAMILY MEDICINE | Facility: CLINIC | Age: 32
End: 2022-02-28
Payer: COMMERCIAL

## 2022-02-28 VITALS
HEART RATE: 87 BPM | WEIGHT: 170.63 LBS | OXYGEN SATURATION: 98 % | HEIGHT: 63 IN | SYSTOLIC BLOOD PRESSURE: 122 MMHG | BODY MASS INDEX: 30.23 KG/M2 | TEMPERATURE: 99 F | RESPIRATION RATE: 18 BRPM | DIASTOLIC BLOOD PRESSURE: 68 MMHG

## 2022-02-28 DIAGNOSIS — J01.00 ACUTE MAXILLARY SINUSITIS, RECURRENCE NOT SPECIFIED: Primary | ICD-10-CM

## 2022-02-28 DIAGNOSIS — R05.9 COUGH: ICD-10-CM

## 2022-02-28 DIAGNOSIS — R51.9 SINUS HEADACHE: ICD-10-CM

## 2022-02-28 PROCEDURE — 99999 PR PBB SHADOW E&M-EST. PATIENT-LVL V: ICD-10-PCS | Mod: PBBFAC,,, | Performed by: PHYSICIAN ASSISTANT

## 2022-02-28 PROCEDURE — 99213 OFFICE O/P EST LOW 20 MIN: CPT | Mod: S$GLB,,, | Performed by: PHYSICIAN ASSISTANT

## 2022-02-28 PROCEDURE — 3008F BODY MASS INDEX DOCD: CPT | Mod: CPTII,S$GLB,, | Performed by: PHYSICIAN ASSISTANT

## 2022-02-28 PROCEDURE — 1160F PR REVIEW ALL MEDS BY PRESCRIBER/CLIN PHARMACIST DOCUMENTED: ICD-10-PCS | Mod: CPTII,S$GLB,, | Performed by: PHYSICIAN ASSISTANT

## 2022-02-28 PROCEDURE — 3078F DIAST BP <80 MM HG: CPT | Mod: CPTII,S$GLB,, | Performed by: PHYSICIAN ASSISTANT

## 2022-02-28 PROCEDURE — 99213 PR OFFICE/OUTPT VISIT, EST, LEVL III, 20-29 MIN: ICD-10-PCS | Mod: S$GLB,,, | Performed by: PHYSICIAN ASSISTANT

## 2022-02-28 PROCEDURE — 1159F PR MEDICATION LIST DOCUMENTED IN MEDICAL RECORD: ICD-10-PCS | Mod: CPTII,S$GLB,, | Performed by: PHYSICIAN ASSISTANT

## 2022-02-28 PROCEDURE — 3074F PR MOST RECENT SYSTOLIC BLOOD PRESSURE < 130 MM HG: ICD-10-PCS | Mod: CPTII,S$GLB,, | Performed by: PHYSICIAN ASSISTANT

## 2022-02-28 PROCEDURE — 1159F MED LIST DOCD IN RCRD: CPT | Mod: CPTII,S$GLB,, | Performed by: PHYSICIAN ASSISTANT

## 2022-02-28 PROCEDURE — 99999 PR PBB SHADOW E&M-EST. PATIENT-LVL V: CPT | Mod: PBBFAC,,, | Performed by: PHYSICIAN ASSISTANT

## 2022-02-28 PROCEDURE — 3008F PR BODY MASS INDEX (BMI) DOCUMENTED: ICD-10-PCS | Mod: CPTII,S$GLB,, | Performed by: PHYSICIAN ASSISTANT

## 2022-02-28 PROCEDURE — 3078F PR MOST RECENT DIASTOLIC BLOOD PRESSURE < 80 MM HG: ICD-10-PCS | Mod: CPTII,S$GLB,, | Performed by: PHYSICIAN ASSISTANT

## 2022-02-28 PROCEDURE — 3074F SYST BP LT 130 MM HG: CPT | Mod: CPTII,S$GLB,, | Performed by: PHYSICIAN ASSISTANT

## 2022-02-28 PROCEDURE — 1160F RVW MEDS BY RX/DR IN RCRD: CPT | Mod: CPTII,S$GLB,, | Performed by: PHYSICIAN ASSISTANT

## 2022-02-28 RX ORDER — DOXYCYCLINE HYCLATE 100 MG
100 TABLET ORAL 2 TIMES DAILY
Qty: 20 TABLET | Refills: 0 | Status: SHIPPED | OUTPATIENT
Start: 2022-02-28 | End: 2022-03-10

## 2022-02-28 RX ORDER — PROMETHAZINE HYDROCHLORIDE AND DEXTROMETHORPHAN HYDROBROMIDE 6.25; 15 MG/5ML; MG/5ML
5 SYRUP ORAL NIGHTLY PRN
Qty: 180 ML | Refills: 0 | Status: SHIPPED | OUTPATIENT
Start: 2022-02-28 | End: 2022-03-10

## 2022-02-28 RX ORDER — METHYLPREDNISOLONE 4 MG/1
TABLET ORAL
Qty: 1 EACH | Refills: 0 | Status: SHIPPED | OUTPATIENT
Start: 2022-02-28 | End: 2023-05-22

## 2022-02-28 NOTE — PROGRESS NOTES
Subjective:       Patient ID: Alea Barrett is a 32 y.o. female.    Chief Complaint: Cough, Nasal Congestion, and Headache    Ms. Barrett is a 32 year old female who was diagnosis with COVID earlier this month. She has continued sinus pain and pressure. She also has a cough and post nasal drip. She has been prescribed cough medication and an inhaler. The patient reports these have not helped significantly.     Review of patient's allergies indicates:   Allergen Reactions    No known drug allergies          Current Outpatient Medications:     albuterol (VENTOLIN HFA) 90 mcg/actuation inhaler, Inhale 2 puffs into the lungs every 6 (six) hours as needed for Wheezing. Rescue, Disp: 18 g, Rfl: 0    cetirizine (ZYRTEC) 10 MG tablet, Take 1 tablet (10 mg total) by mouth once daily., Disp: 30 tablet, Rfl: 0    fluticasone propionate (FLONASE) 50 mcg/actuation nasal spray, 1 spray (50 mcg total) by Each Nostril route once daily., Disp: 15.8 mL, Rfl: 0    norelgestromin-ethinyl estradiol (ORTHO EVRA) 150-35 mcg/24 hr, Place 1 patch onto the skin once a week., Disp: , Rfl:     vitamin D (VITAMIN D3) 1000 units Tab, Take 2,000 Units by mouth once daily., Disp: , Rfl:     doxycycline (VIBRA-TABS) 100 MG tablet, Take 1 tablet (100 mg total) by mouth 2 (two) times daily. for 10 days, Disp: 20 tablet, Rfl: 0    methylPREDNISolone (MEDROL DOSEPACK) 4 mg tablet, use as directed, Disp: 1 each, Rfl: 0    promethazine-dextromethorphan (PROMETHAZINE-DM) 6.25-15 mg/5 mL Syrp, Take 5 mLs by mouth nightly as needed (cough)., Disp: 180 mL, Rfl: 0    Lab Results   Component Value Date    WBC 7.32 09/27/2021    HGB 13.3 09/27/2021    HCT 40.6 09/27/2021     09/27/2021    CHOL 218 (H) 09/27/2021    TRIG 67 09/27/2021    HDL 94 (H) 09/27/2021    ALT 14 09/27/2021    AST 15 09/27/2021     09/27/2021    K 4.2 09/27/2021     09/27/2021    CREATININE 0.8 09/27/2021    BUN 9 09/27/2021    CO2 23 09/27/2021    TSH 0.922  09/27/2021       Review of Systems   Constitutional: Positive for activity change and fatigue. Negative for appetite change and fever.   HENT: Positive for postnasal drip, sinus pressure and sinus pain. Negative for rhinorrhea.    Eyes: Negative for visual disturbance.   Respiratory: Positive for cough. Negative for shortness of breath.    Cardiovascular: Negative for chest pain.   Gastrointestinal: Negative for abdominal distention and abdominal pain.   Musculoskeletal: Negative for arthralgias and myalgias.   Neurological: Positive for headaches.   Hematological: Negative for adenopathy.   Psychiatric/Behavioral: The patient is not nervous/anxious.        Objective:      Physical Exam  Constitutional:       General: She is not in acute distress.     Appearance: Normal appearance.   HENT:      Head: Normocephalic and atraumatic.      Comments: Maxillary sinuses TTP     Right Ear: Tympanic membrane, ear canal and external ear normal.      Left Ear: Tympanic membrane, ear canal and external ear normal.      Mouth/Throat:      Pharynx: No oropharyngeal exudate or posterior oropharyngeal erythema.      Comments: Posterior oropharynx mildly erythematous with post nasal drip  Eyes:      Conjunctiva/sclera: Conjunctivae normal.   Cardiovascular:      Rate and Rhythm: Normal rate and regular rhythm.   Pulmonary:      Effort: Pulmonary effort is normal. No respiratory distress.      Breath sounds: Normal breath sounds. No wheezing.   Musculoskeletal:      Right lower leg: No edema.      Left lower leg: No edema.   Lymphadenopathy:      Cervical: No cervical adenopathy.   Neurological:      Mental Status: She is alert and oriented to person, place, and time.   Psychiatric:         Behavior: Behavior normal.         Assessment:       1. Acute maxillary sinusitis, recurrence not specified    2. Sinus headache    3. Cough        Plan:     Alea was seen today for cough, nasal congestion and headache.    Diagnoses and all  orders for this visit:    Acute maxillary sinusitis, recurrence not specified  -     doxycycline (VIBRA-TABS) 100 MG tablet; Take 1 tablet (100 mg total) by mouth 2 (two) times daily. for 10 days Avoid sun exposure while taking this medication  Patient wishes to not take amoxicillin containing medication    Sinus headache  -     methylPREDNISolone (MEDROL DOSEPACK) 4 mg tablet; use as directed    Cough  -     promethazine-dextromethorphan (PROMETHAZINE-DM) 6.25-15 mg/5 mL Syrp; Take 5 mLs by mouth nightly as needed (cough).    Take antibiotics with food.  Increase fluid intake.  Call the clinic if symptoms worsen, new symptoms develop or if you are not any better after completion of your antibiotics.      Follow up in 4-6 weeks or sooner if needed.

## 2022-02-28 NOTE — PATIENT INSTRUCTIONS
If you are due for any health screening(s) below please notify me so we can arrange them to be ordered and scheduled to maintain your health.     Health Maintenance   Topic Date Due    TETANUS VACCINE  07/20/2019    Hepatitis C Screening  Completed    Lipid Panel  Completed

## 2022-07-07 ENCOUNTER — OFFICE VISIT (OUTPATIENT)
Dept: URGENT CARE | Facility: CLINIC | Age: 32
End: 2022-07-07
Payer: COMMERCIAL

## 2022-07-07 VITALS
HEIGHT: 63 IN | HEART RATE: 93 BPM | DIASTOLIC BLOOD PRESSURE: 78 MMHG | TEMPERATURE: 99 F | WEIGHT: 168 LBS | OXYGEN SATURATION: 99 % | BODY MASS INDEX: 29.77 KG/M2 | RESPIRATION RATE: 16 BRPM | SYSTOLIC BLOOD PRESSURE: 125 MMHG

## 2022-07-07 DIAGNOSIS — R50.9 FEVER, UNSPECIFIED FEVER CAUSE: Primary | ICD-10-CM

## 2022-07-07 DIAGNOSIS — Z20.822 COVID-19 VIRUS NOT DETECTED: ICD-10-CM

## 2022-07-07 DIAGNOSIS — J01.10 ACUTE NON-RECURRENT FRONTAL SINUSITIS: ICD-10-CM

## 2022-07-07 DIAGNOSIS — R05.9 COUGH: ICD-10-CM

## 2022-07-07 LAB
CTP QC/QA: YES
SARS-COV-2 AG RESP QL IA.RAPID: NEGATIVE

## 2022-07-07 PROCEDURE — 1160F PR REVIEW ALL MEDS BY PRESCRIBER/CLIN PHARMACIST DOCUMENTED: ICD-10-PCS | Mod: CPTII,S$GLB,, | Performed by: NURSE PRACTITIONER

## 2022-07-07 PROCEDURE — 99214 OFFICE O/P EST MOD 30 MIN: CPT | Mod: S$GLB,,, | Performed by: NURSE PRACTITIONER

## 2022-07-07 PROCEDURE — 3074F SYST BP LT 130 MM HG: CPT | Mod: CPTII,S$GLB,, | Performed by: NURSE PRACTITIONER

## 2022-07-07 PROCEDURE — 1159F PR MEDICATION LIST DOCUMENTED IN MEDICAL RECORD: ICD-10-PCS | Mod: CPTII,S$GLB,, | Performed by: NURSE PRACTITIONER

## 2022-07-07 PROCEDURE — 3008F BODY MASS INDEX DOCD: CPT | Mod: CPTII,S$GLB,, | Performed by: NURSE PRACTITIONER

## 2022-07-07 PROCEDURE — 3078F DIAST BP <80 MM HG: CPT | Mod: CPTII,S$GLB,, | Performed by: NURSE PRACTITIONER

## 2022-07-07 PROCEDURE — 87811 SARS-COV-2 COVID19 W/OPTIC: CPT | Mod: QW,S$GLB,, | Performed by: NURSE PRACTITIONER

## 2022-07-07 PROCEDURE — 3008F PR BODY MASS INDEX (BMI) DOCUMENTED: ICD-10-PCS | Mod: CPTII,S$GLB,, | Performed by: NURSE PRACTITIONER

## 2022-07-07 PROCEDURE — 87811 SARS CORONAVIRUS 2 ANTIGEN POCT, MANUAL READ: ICD-10-PCS | Mod: QW,S$GLB,, | Performed by: NURSE PRACTITIONER

## 2022-07-07 PROCEDURE — 1159F MED LIST DOCD IN RCRD: CPT | Mod: CPTII,S$GLB,, | Performed by: NURSE PRACTITIONER

## 2022-07-07 PROCEDURE — 1160F RVW MEDS BY RX/DR IN RCRD: CPT | Mod: CPTII,S$GLB,, | Performed by: NURSE PRACTITIONER

## 2022-07-07 PROCEDURE — 99214 PR OFFICE/OUTPT VISIT, EST, LEVL IV, 30-39 MIN: ICD-10-PCS | Mod: S$GLB,,, | Performed by: NURSE PRACTITIONER

## 2022-07-07 PROCEDURE — 3078F PR MOST RECENT DIASTOLIC BLOOD PRESSURE < 80 MM HG: ICD-10-PCS | Mod: CPTII,S$GLB,, | Performed by: NURSE PRACTITIONER

## 2022-07-07 PROCEDURE — 3074F PR MOST RECENT SYSTOLIC BLOOD PRESSURE < 130 MM HG: ICD-10-PCS | Mod: CPTII,S$GLB,, | Performed by: NURSE PRACTITIONER

## 2022-07-07 RX ORDER — AZITHROMYCIN 250 MG/1
TABLET, FILM COATED ORAL
Qty: 6 TABLET | Refills: 0 | Status: SHIPPED | OUTPATIENT
Start: 2022-07-07 | End: 2022-07-12

## 2022-07-07 RX ORDER — BENZONATATE 200 MG/1
200 CAPSULE ORAL 3 TIMES DAILY PRN
Qty: 21 CAPSULE | Refills: 0 | Status: SHIPPED | OUTPATIENT
Start: 2022-07-07 | End: 2022-07-14

## 2022-07-07 RX ORDER — PROMETHAZINE HYDROCHLORIDE AND DEXTROMETHORPHAN HYDROBROMIDE 6.25; 15 MG/5ML; MG/5ML
5 SYRUP ORAL NIGHTLY PRN
Qty: 50 ML | Refills: 0 | Status: SHIPPED | OUTPATIENT
Start: 2022-07-07 | End: 2022-07-17

## 2022-07-07 NOTE — LETTER
July 7, 2022      [facility]  [address]  [facility] [address]  Phone: [phone]       Patient: Alea Barrett   YOB: 1990  Date of Visit: 07/07/2022    To Whom It May Concern:    Matias Barrett  was at [facility] on 07/07/2022. The patient may return to work/school when she is fever free for 24 hours and her symptoms are improving. If you have any questions or concerns, or if I can be of further assistance, please do not hesitate to contact me.    Sincerely,    Tiffanie Beauchamp NP

## 2022-07-07 NOTE — PROGRESS NOTES
"Subjective:       Patient ID: Alea Barrett is a 32 y.o. female.    Vitals:  height is 5' 3" (1.6 m) and weight is 76.2 kg (168 lb). Her oral temperature is 98.8 °F (37.1 °C). Her blood pressure is 125/78 and her pulse is 93. Her respiration is 16 and oxygen saturation is 99%.     Chief Complaint: Fever    Fever   This is a new problem. Episode onset: 3 days ago. The problem has been gradually worsening. Associated symptoms include headaches, muscle aches and a sore throat. Associated symptoms comments: Chills. She has tried acetaminophen for the symptoms. The treatment provided no relief.       Constitution: Positive for fever.   HENT: Positive for sore throat.    Neurological: Positive for headaches.       Objective:      Physical Exam   Constitutional: She is oriented to person, place, and time.   HENT:   Head: Normocephalic and atraumatic.   Ears:   Right Ear: Tympanic membrane, external ear and ear canal normal.   Left Ear: Tympanic membrane, external ear and ear canal normal.   Nose: Congestion present.   Mouth/Throat: Posterior oropharyngeal erythema present.   Eyes: Conjunctivae are normal.   Neck: Neck supple.   Cardiovascular: Normal rate, regular rhythm, normal heart sounds and normal pulses.   Pulmonary/Chest: Effort normal and breath sounds normal.   Abdominal: Normal appearance. Soft.   Musculoskeletal: Normal range of motion.         General: Normal range of motion.   Neurological: She is alert and oriented to person, place, and time.   Skin: Skin is warm and dry. Capillary refill takes 2 to 3 seconds.   Psychiatric: Her behavior is normal. Mood normal.   Nursing note and vitals reviewed.        Assessment:       1. Fever, unspecified fever cause    2. COVID-19 virus not detected    3. Cough    4. Acute non-recurrent frontal sinusitis      Covid antigen: Negative    Plan:         Fever, unspecified fever cause  -     SARS Coronavirus 2 Antigen, POCT Manual Read    COVID-19 virus not " detected    Cough  -     promethazine-dextromethorphan (PROMETHAZINE-DM) 6.25-15 mg/5 mL Syrp; Take 5 mLs by mouth nightly as needed (cough).  Dispense: 50 mL; Refill: 0  -     benzonatate (TESSALON) 200 MG capsule; Take 1 capsule (200 mg total) by mouth 3 (three) times daily as needed for Cough.  Dispense: 21 capsule; Refill: 0    Acute non-recurrent frontal sinusitis  -     azithromycin (Z-OG) 250 MG tablet; Take 2 tablets by mouth on day 1; Take 1 tablet by mouth on days 2-5  Dispense: 6 tablet; Refill: 0    Azithromycin 500mg on day 1 then 250mg on day 2,3,4,5 take with food  Promethazine DM 1 teaspoon at bedtime as needed for cough  Tessalon 200mg Take 1 capsule by mouth 3 times daily as needed for cough  Tylenol/Ibuprofen as directed for fever/body aches  Follow up if symptoms persist or worsen

## 2022-07-07 NOTE — PATIENT INSTRUCTIONS
Azithromycin 500mg on day 1 then 250mg on day 2,3,4,5 take with food  Promethazine DM 1 teaspoon at bedtime as needed for cough  Tessalon 200mg Take 1 capsule by mouth 3 times daily as needed for cough  Tylenol/Ibuprofen as directed for fever/body aches  Follow up if symptoms persist or worsen

## 2022-09-24 NOTE — ASSESSMENT & PLAN NOTE
Holter monitor with sinus tachycardia with average heart rate 103 which may be her baseline.  I doubt that she has hyperthyroidism or pheochromocytoma.  We discussed testing plasma metanephrines may show a slightly high level as she is currently taking Amitriptyline (has been on this medication for 4 years) however if very high will need further investigation.  My suspicion for pheochromocytoma or paraganglioma is low.   impairments found/rehab potential

## 2023-04-07 ENCOUNTER — OFFICE VISIT (OUTPATIENT)
Dept: URGENT CARE | Facility: CLINIC | Age: 33
End: 2023-04-07
Payer: COMMERCIAL

## 2023-04-07 VITALS
BODY MASS INDEX: 30.83 KG/M2 | HEART RATE: 98 BPM | HEIGHT: 63 IN | OXYGEN SATURATION: 98 % | TEMPERATURE: 98 F | DIASTOLIC BLOOD PRESSURE: 90 MMHG | WEIGHT: 174 LBS | RESPIRATION RATE: 18 BRPM | SYSTOLIC BLOOD PRESSURE: 131 MMHG

## 2023-04-07 DIAGNOSIS — J04.0 LARYNGITIS: ICD-10-CM

## 2023-04-07 DIAGNOSIS — R50.9 FEVER, UNSPECIFIED FEVER CAUSE: ICD-10-CM

## 2023-04-07 DIAGNOSIS — J06.9 VIRAL URI WITH COUGH: Primary | ICD-10-CM

## 2023-04-07 DIAGNOSIS — H69.93 EUSTACHIAN TUBE DYSFUNCTION, BILATERAL: ICD-10-CM

## 2023-04-07 LAB
CTP QC/QA: YES
CTP QC/QA: YES
FLUAV AG NPH QL: NEGATIVE
FLUBV AG NPH QL: NEGATIVE
SARS-COV-2 AG RESP QL IA.RAPID: NEGATIVE

## 2023-04-07 PROCEDURE — 87804 POCT INFLUENZA A/B: ICD-10-PCS | Mod: QW,,, | Performed by: NURSE PRACTITIONER

## 2023-04-07 PROCEDURE — 99214 OFFICE O/P EST MOD 30 MIN: CPT | Mod: S$GLB,,, | Performed by: NURSE PRACTITIONER

## 2023-04-07 PROCEDURE — 87811 SARS CORONAVIRUS 2 ANTIGEN POCT, MANUAL READ: ICD-10-PCS | Mod: QW,S$GLB,, | Performed by: NURSE PRACTITIONER

## 2023-04-07 PROCEDURE — 87811 SARS-COV-2 COVID19 W/OPTIC: CPT | Mod: QW,S$GLB,, | Performed by: NURSE PRACTITIONER

## 2023-04-07 PROCEDURE — 87804 INFLUENZA ASSAY W/OPTIC: CPT | Mod: QW,,, | Performed by: NURSE PRACTITIONER

## 2023-04-07 PROCEDURE — 99214 PR OFFICE/OUTPT VISIT, EST, LEVL IV, 30-39 MIN: ICD-10-PCS | Mod: S$GLB,,, | Performed by: NURSE PRACTITIONER

## 2023-04-07 RX ORDER — BROMPHENIRAMINE MALEATE, PSEUDOEPHEDRINE HYDROCHLORIDE, AND DEXTROMETHORPHAN HYDROBROMIDE 2; 30; 10 MG/5ML; MG/5ML; MG/5ML
10 SYRUP ORAL EVERY 4 HOURS PRN
Qty: 118 ML | Refills: 0 | Status: SHIPPED | OUTPATIENT
Start: 2023-04-07 | End: 2023-04-17

## 2023-04-07 RX ORDER — BENZONATATE 100 MG/1
100 CAPSULE ORAL 3 TIMES DAILY PRN
Qty: 21 CAPSULE | Refills: 0 | Status: SHIPPED | OUTPATIENT
Start: 2023-04-07 | End: 2023-04-14

## 2023-04-07 RX ORDER — AZELASTINE 1 MG/ML
1 SPRAY, METERED NASAL 2 TIMES DAILY
Qty: 30 ML | Refills: 0 | Status: SHIPPED | OUTPATIENT
Start: 2023-04-07 | End: 2023-05-22

## 2023-04-07 RX ORDER — GUAIFENESIN AND DEXTROMETHORPHAN HYDROBROMIDE 20; 400 MG/1; MG/1
1 TABLET ORAL EVERY 4 HOURS PRN
Qty: 30 EACH | Refills: 0 | Status: SHIPPED | OUTPATIENT
Start: 2023-04-07 | End: 2023-04-17

## 2023-04-08 NOTE — PROGRESS NOTES
"Subjective:      Patient ID: Alea Barrett is a 33 y.o. female.    Vitals:  height is 5' 3" (1.6 m) and weight is 78.9 kg (174 lb). Her oral temperature is 98 °F (36.7 °C). Her blood pressure is 131/90 (abnormal) and her pulse is 98. Her respiration is 18 and oxygen saturation is 98%.     Chief Complaint: Cough    33-year-old female seen today for cough, sinus congestion and fever.  States approximately 1 week ago she developed laryngitis and a fever along with a sore throat.  She states that symptoms have changed and now she is having a productive cough with continued laryngitis and intermittent sore throat.    Cough  This is a new problem. The current episode started in the past 7 days. The problem has been gradually improving. The problem occurs constantly. The cough is Productive of sputum. Associated symptoms include chills, ear congestion, ear pain, a fever, headaches, nasal congestion, postnasal drip, a sore throat and sweats. Pertinent negatives include no rash. The symptoms are aggravated by lying down and cold air. She has tried OTC cough suppressant (tylenol) for the symptoms. The treatment provided mild relief.     Constitution: Positive for chills and fever.   HENT:  Positive for ear pain, congestion, postnasal drip, sinus pressure, sore throat and voice change. Negative for trouble swallowing.    Neck: Negative for painful lymph nodes.   Respiratory:  Positive for cough.    Skin:  Negative for rash.   Neurological:  Positive for headaches. Negative for dizziness, light-headedness, passing out, disorientation and altered mental status.   Hematologic/Lymphatic: Negative for swollen lymph nodes.   Psychiatric/Behavioral:  Negative for altered mental status, disorientation and confusion.     Objective:     Physical Exam   Constitutional: She is oriented to person, place, and time. She appears well-developed. She is cooperative.  Non-toxic appearance. She does not appear ill. No distress.   HENT:   Head: " Normocephalic and atraumatic.   Ears:   Right Ear: Hearing, external ear and ear canal normal. Tympanic membrane is bulging. Tympanic membrane is not erythematous. No middle ear effusion.   Left Ear: Hearing, external ear and ear canal normal. Tympanic membrane is bulging. Tympanic membrane is not erythematous.  No middle ear effusion.   Nose: Mucosal edema, rhinorrhea and congestion present. No nasal deformity. No epistaxis. Right sinus exhibits no maxillary sinus tenderness and no frontal sinus tenderness. Left sinus exhibits no maxillary sinus tenderness and no frontal sinus tenderness.   Mouth/Throat: Uvula is midline and mucous membranes are normal. Mucous membranes are moist. No trismus in the jaw. Normal dentition. No uvula swelling. No oropharyngeal exudate, posterior oropharyngeal edema, posterior oropharyngeal erythema, tonsillar abscesses or cobblestoning. Tonsils are 1+ on the right. Tonsils are 1+ on the left. No tonsillar exudate. Oropharynx is clear.   Eyes: Conjunctivae and lids are normal. No scleral icterus.   Neck: Trachea normal and phonation normal. Neck supple. No edema present. No erythema present. No neck rigidity present.   Cardiovascular: Normal rate, regular rhythm, normal heart sounds and normal pulses.   Pulmonary/Chest: Effort normal and breath sounds normal. No accessory muscle usage or stridor. No respiratory distress. She has no decreased breath sounds. She has no wheezes. She has no rhonchi.   Abdominal: Normal appearance.   Musculoskeletal: Normal range of motion.         General: No deformity. Normal range of motion.   Lymphadenopathy:     She has no cervical adenopathy.   Neurological: no focal deficit. She is alert and oriented to person, place, and time. She exhibits normal muscle tone. Coordination normal.   Skin: Skin is warm, dry, intact, not diaphoretic and not pale. Capillary refill takes 2 to 3 seconds.   Psychiatric: Her speech is normal and behavior is normal. Judgment  and thought content normal.   Nursing note and vitals reviewed.    Assessment:     1. Viral URI with cough    2. Laryngitis    3. Fever, unspecified fever cause    4. Eustachian tube dysfunction, bilateral        Plan:       Viral URI with cough  -     SARS Coronavirus 2 Antigen, POCT Manual Read  -     POCT Influenza A/B Rapid Antigen  -     azelastine (ASTELIN) 137 mcg (0.1 %) nasal spray; 1 spray (137 mcg total) by Nasal route 2 (two) times daily.  Dispense: 30 mL; Refill: 0  -     brompheniramine-pseudoeph-DM (BROMFED DM) 2-30-10 mg/5 mL Syrp; Take 10 mLs by mouth every 4 (four) hours as needed (sinus congestion/drainage).  Dispense: 118 mL; Refill: 0  -     benzocaine-menthoL 6-10 mg lozenge; Take 1 lozenge by mouth every 2 (two) hours as needed (Sore Throat).  Dispense: 18 tablet; Refill: 0  -     benzonatate (TESSALON) 100 MG capsule; Take 1 capsule (100 mg total) by mouth 3 (three) times daily as needed for Cough.  Dispense: 21 capsule; Refill: 0  -     dextromethorphan-guaiFENesin  mg Tab; Take 1 tablet by mouth every 4 (four) hours as needed (cough/congestion).  Dispense: 30 each; Refill: 0    Laryngitis    Fever, unspecified fever cause    Eustachian tube dysfunction, bilateral    COVID negative  Influenza negative       I have discussed the test results and physical exam findings with the patient. She has an overall favorable physical exam without evidence of distress. We discussed symptom monitoring, conservative care methods, medication use, and follow up orders. She verbalized understanding and agreement with the plan of care.

## 2023-04-08 NOTE — PATIENT INSTRUCTIONS
Increase clear fluid intake  Stop all current over the counter cough, cold, flu medicine  Tylenol/motrin otc for fever or pain  Start Astelin nasal spray and Bromfed syrup for sinus congestion and pressure.  Take Mucinex DM as needed for chest congestion and coughing. Take mucinex with a full glass of water at each dose  Add a humidifier to your room at bedtime for respiratory comfort.  Saltwater gargles 4 x daily and benzocaine anesthetic throat lozenges for sore throat. May also add honey based cough syrup  Follow up with PCP  Go immediately to the nearest emergency room for shortness of breath, chest pain,  or other emergent concern.  Return to clinic for new, worse, or unresolving symptoms

## 2023-05-22 ENCOUNTER — LAB VISIT (OUTPATIENT)
Dept: LAB | Facility: HOSPITAL | Age: 33
End: 2023-05-22
Attending: FAMILY MEDICINE
Payer: COMMERCIAL

## 2023-05-22 ENCOUNTER — OFFICE VISIT (OUTPATIENT)
Dept: FAMILY MEDICINE | Facility: CLINIC | Age: 33
End: 2023-05-22
Payer: COMMERCIAL

## 2023-05-22 VITALS
DIASTOLIC BLOOD PRESSURE: 78 MMHG | HEART RATE: 84 BPM | OXYGEN SATURATION: 97 % | BODY MASS INDEX: 31.38 KG/M2 | WEIGHT: 177.13 LBS | HEIGHT: 63 IN | SYSTOLIC BLOOD PRESSURE: 108 MMHG

## 2023-05-22 DIAGNOSIS — R63.5 WEIGHT GAIN: ICD-10-CM

## 2023-05-22 DIAGNOSIS — Z00.01 ANNUAL VISIT FOR GENERAL ADULT MEDICAL EXAMINATION WITH ABNORMAL FINDINGS: Primary | ICD-10-CM

## 2023-05-22 DIAGNOSIS — E55.9 VITAMIN D DEFICIENCY: ICD-10-CM

## 2023-05-22 DIAGNOSIS — Z00.01 ANNUAL VISIT FOR GENERAL ADULT MEDICAL EXAMINATION WITH ABNORMAL FINDINGS: ICD-10-CM

## 2023-05-22 LAB
25(OH)D3+25(OH)D2 SERPL-MCNC: 19 NG/ML (ref 30–96)
BASOPHILS # BLD AUTO: 0.04 K/UL (ref 0–0.2)
BASOPHILS NFR BLD: 0.6 % (ref 0–1.9)
CHOLEST SERPL-MCNC: 187 MG/DL (ref 120–199)
CHOLEST/HDLC SERPL: 2.5 {RATIO} (ref 2–5)
DIFFERENTIAL METHOD: ABNORMAL
EOSINOPHIL # BLD AUTO: 0.1 K/UL (ref 0–0.5)
EOSINOPHIL NFR BLD: 1.3 % (ref 0–8)
ERYTHROCYTE [DISTWIDTH] IN BLOOD BY AUTOMATED COUNT: 13 % (ref 11.5–14.5)
HCT VFR BLD AUTO: 39.9 % (ref 37–48.5)
HDLC SERPL-MCNC: 74 MG/DL (ref 40–75)
HDLC SERPL: 39.6 % (ref 20–50)
HGB BLD-MCNC: 12.7 G/DL (ref 12–16)
IMM GRANULOCYTES # BLD AUTO: 0.02 K/UL (ref 0–0.04)
IMM GRANULOCYTES NFR BLD AUTO: 0.3 % (ref 0–0.5)
INSULIN COLLECTION INTERVAL: NORMAL
INSULIN SERPL-ACNC: 8.1 UU/ML
LDLC SERPL CALC-MCNC: 102 MG/DL (ref 63–159)
LYMPHOCYTES # BLD AUTO: 1.9 K/UL (ref 1–4.8)
LYMPHOCYTES NFR BLD: 27.6 % (ref 18–48)
MCH RBC QN AUTO: 28.3 PG (ref 27–31)
MCHC RBC AUTO-ENTMCNC: 31.8 G/DL (ref 32–36)
MCV RBC AUTO: 89 FL (ref 82–98)
MONOCYTES # BLD AUTO: 0.3 K/UL (ref 0.3–1)
MONOCYTES NFR BLD: 4.4 % (ref 4–15)
NEUTROPHILS # BLD AUTO: 4.6 K/UL (ref 1.8–7.7)
NEUTROPHILS NFR BLD: 65.8 % (ref 38–73)
NONHDLC SERPL-MCNC: 113 MG/DL
NRBC BLD-RTO: 0 /100 WBC
PLATELET # BLD AUTO: 332 K/UL (ref 150–450)
PMV BLD AUTO: 10.1 FL (ref 9.2–12.9)
RBC # BLD AUTO: 4.48 M/UL (ref 4–5.4)
TRIGL SERPL-MCNC: 55 MG/DL (ref 30–150)
TSH SERPL DL<=0.005 MIU/L-ACNC: 0.47 UIU/ML (ref 0.4–4)
WBC # BLD AUTO: 6.99 K/UL (ref 3.9–12.7)

## 2023-05-22 PROCEDURE — 3074F PR MOST RECENT SYSTOLIC BLOOD PRESSURE < 130 MM HG: ICD-10-PCS | Mod: CPTII,S$GLB,, | Performed by: FAMILY MEDICINE

## 2023-05-22 PROCEDURE — 84443 ASSAY THYROID STIM HORMONE: CPT | Performed by: FAMILY MEDICINE

## 2023-05-22 PROCEDURE — 82306 VITAMIN D 25 HYDROXY: CPT | Performed by: FAMILY MEDICINE

## 2023-05-22 PROCEDURE — 99999 PR PBB SHADOW E&M-EST. PATIENT-LVL III: ICD-10-PCS | Mod: PBBFAC,,, | Performed by: FAMILY MEDICINE

## 2023-05-22 PROCEDURE — 3008F BODY MASS INDEX DOCD: CPT | Mod: CPTII,S$GLB,, | Performed by: FAMILY MEDICINE

## 2023-05-22 PROCEDURE — 99395 PREV VISIT EST AGE 18-39: CPT | Mod: S$GLB,,, | Performed by: FAMILY MEDICINE

## 2023-05-22 PROCEDURE — 80061 LIPID PANEL: CPT | Performed by: FAMILY MEDICINE

## 2023-05-22 PROCEDURE — 99395 PR PREVENTIVE VISIT,EST,18-39: ICD-10-PCS | Mod: S$GLB,,, | Performed by: FAMILY MEDICINE

## 2023-05-22 PROCEDURE — 36415 COLL VENOUS BLD VENIPUNCTURE: CPT | Mod: PO | Performed by: FAMILY MEDICINE

## 2023-05-22 PROCEDURE — 1159F PR MEDICATION LIST DOCUMENTED IN MEDICAL RECORD: ICD-10-PCS | Mod: CPTII,S$GLB,, | Performed by: FAMILY MEDICINE

## 2023-05-22 PROCEDURE — 85025 COMPLETE CBC W/AUTO DIFF WBC: CPT | Performed by: FAMILY MEDICINE

## 2023-05-22 PROCEDURE — 3078F PR MOST RECENT DIASTOLIC BLOOD PRESSURE < 80 MM HG: ICD-10-PCS | Mod: CPTII,S$GLB,, | Performed by: FAMILY MEDICINE

## 2023-05-22 PROCEDURE — 80053 COMPREHEN METABOLIC PANEL: CPT | Performed by: FAMILY MEDICINE

## 2023-05-22 PROCEDURE — 1159F MED LIST DOCD IN RCRD: CPT | Mod: CPTII,S$GLB,, | Performed by: FAMILY MEDICINE

## 2023-05-22 PROCEDURE — 3074F SYST BP LT 130 MM HG: CPT | Mod: CPTII,S$GLB,, | Performed by: FAMILY MEDICINE

## 2023-05-22 PROCEDURE — 3008F PR BODY MASS INDEX (BMI) DOCUMENTED: ICD-10-PCS | Mod: CPTII,S$GLB,, | Performed by: FAMILY MEDICINE

## 2023-05-22 PROCEDURE — 83525 ASSAY OF INSULIN: CPT | Performed by: FAMILY MEDICINE

## 2023-05-22 PROCEDURE — 3078F DIAST BP <80 MM HG: CPT | Mod: CPTII,S$GLB,, | Performed by: FAMILY MEDICINE

## 2023-05-22 PROCEDURE — 99999 PR PBB SHADOW E&M-EST. PATIENT-LVL III: CPT | Mod: PBBFAC,,, | Performed by: FAMILY MEDICINE

## 2023-05-22 NOTE — PROGRESS NOTES
Assessment:       1. Annual visit for general adult medical examination with abnormal findings    2. Vitamin D deficiency    3. Weight gain        Plan:       Annual visit for general adult medical examination with abnormal findings  -     Insulin, Random; Future; Expected date: 05/22/2023  -     Vitamin D; Future; Expected date: 05/22/2023  -     CBC Auto Differential; Future; Expected date: 05/22/2023  -     Comprehensive Metabolic Panel; Future; Expected date: 05/22/2023  -     TSH; Future; Expected date: 05/22/2023  -     Lipid Panel; Future; Expected date: 05/22/2023    Vitamin D deficiency: Uncontrolled  -     Vitamin D; Future; Expected date: 05/22/2023    Weight gain: Worsening  -     Insulin, Random; Future; Expected date: 05/22/2023         Recommend healthy habits, avoid processed foods and processed starches, increase physical activity.  Will send a message when we have the results of the test.  The patient's BMI has been recorded in the chart. The patient has been provided educational materials regarding the benefits of attaining and maintaining a normal weight. We will continue to address and follow this issue during follow up visits.   Patient agreed with assessment and plan. Patient verbalized understanding.     Subjective:       Patient ID: Alea Barrett is a 33 y.o. female.    Chief Complaint:  Annual checkup and Follow-up    HPI    The patient is coming here today for an annual checkup and for a follow-up visit.  The patient has vitamin-D deficiency, currently not taking medications, the patient is using Ventolin inhaler as needed, she was sick back in 4 weeks.  The patient denies any symptoms of chest pain, shortness of breath, nausea, vomiting, abdominal pain, diarrhea or constipation.    Past medical history, past social history was reviewed and discussed with the patient.    Review of Systems   Constitutional:  Positive for unexpected weight change. Negative for activity change and appetite  change.   HENT:  Negative for ear discharge.    Eyes:  Negative for discharge and itching.   Respiratory:  Negative for choking and chest tightness.    Cardiovascular:  Negative for palpitations and leg swelling.   Gastrointestinal:  Negative for abdominal distention and constipation.   Endocrine: Negative for cold intolerance and heat intolerance.   Genitourinary:  Negative for dysuria and flank pain.   Musculoskeletal:  Negative for back pain.   Skin:  Negative for pallor.   Allergic/Immunologic: Negative for environmental allergies and food allergies.   Neurological:  Negative for dizziness and facial asymmetry.   Hematological:  Negative for adenopathy. Does not bruise/bleed easily.   Psychiatric/Behavioral:  Negative for agitation, confusion, decreased concentration and sleep disturbance.      Objective:      Physical Exam  Vitals and nursing note reviewed.   Constitutional:       General: She is not in acute distress.     Appearance: Normal appearance. She is well-developed. She is obese. She is not diaphoretic.   HENT:      Head: Normocephalic and atraumatic.      Right Ear: External ear normal.      Left Ear: External ear normal.      Nose: Nose normal.      Mouth/Throat:      Pharynx: No oropharyngeal exudate.   Eyes:      General: No scleral icterus.        Right eye: No discharge.         Left eye: No discharge.      Conjunctiva/sclera: Conjunctivae normal.      Pupils: Pupils are equal, round, and reactive to light.   Cardiovascular:      Rate and Rhythm: Normal rate and regular rhythm.      Heart sounds: Normal heart sounds.   Pulmonary:      Effort: Pulmonary effort is normal. No respiratory distress.      Breath sounds: Normal breath sounds. No wheezing.   Abdominal:      General: Abdomen is flat. Bowel sounds are normal. There is no distension.      Palpations: Abdomen is soft.      Tenderness: There is no abdominal tenderness.   Musculoskeletal:         General: No tenderness.      Cervical back:  Neck supple.   Skin:     Coloration: Skin is not pale.      Findings: No erythema.   Neurological:      Mental Status: She is alert.      Cranial Nerves: No cranial nerve deficit.      Coordination: Coordination normal.   Psychiatric:         Behavior: Behavior normal.         Thought Content: Thought content normal.         Judgment: Judgment normal.

## 2023-05-23 LAB
ALBUMIN SERPL BCP-MCNC: 4 G/DL (ref 3.5–5.2)
ALP SERPL-CCNC: 74 U/L (ref 55–135)
ALT SERPL W/O P-5'-P-CCNC: 10 U/L (ref 10–44)
ANION GAP SERPL CALC-SCNC: 7 MMOL/L (ref 8–16)
AST SERPL-CCNC: 16 U/L (ref 10–40)
BILIRUB SERPL-MCNC: 0.7 MG/DL (ref 0.1–1)
BUN SERPL-MCNC: 7 MG/DL (ref 6–20)
CALCIUM SERPL-MCNC: 9.3 MG/DL (ref 8.7–10.5)
CHLORIDE SERPL-SCNC: 107 MMOL/L (ref 95–110)
CO2 SERPL-SCNC: 26 MMOL/L (ref 23–29)
CREAT SERPL-MCNC: 0.8 MG/DL (ref 0.5–1.4)
EST. GFR  (NO RACE VARIABLE): >60 ML/MIN/1.73 M^2
GLUCOSE SERPL-MCNC: 93 MG/DL (ref 70–110)
POTASSIUM SERPL-SCNC: 4 MMOL/L (ref 3.5–5.1)
PROT SERPL-MCNC: 7.1 G/DL (ref 6–8.4)
SODIUM SERPL-SCNC: 140 MMOL/L (ref 136–145)

## 2023-05-23 RX ORDER — ERGOCALCIFEROL 1.25 MG/1
50000 CAPSULE ORAL
Qty: 12 CAPSULE | Refills: 1 | Status: SHIPPED | OUTPATIENT
Start: 2023-05-23 | End: 2023-11-19

## 2023-07-12 ENCOUNTER — OFFICE VISIT (OUTPATIENT)
Dept: FAMILY MEDICINE | Facility: CLINIC | Age: 33
End: 2023-07-12
Payer: COMMERCIAL

## 2023-07-12 ENCOUNTER — HOSPITAL ENCOUNTER (OUTPATIENT)
Dept: RADIOLOGY | Facility: CLINIC | Age: 33
Discharge: HOME | End: 2023-07-12
Attending: STUDENT IN AN ORGANIZED HEALTH CARE EDUCATION/TRAINING PROGRAM
Payer: COMMERCIAL

## 2023-07-12 VITALS
DIASTOLIC BLOOD PRESSURE: 77 MMHG | BODY MASS INDEX: 29.77 KG/M2 | SYSTOLIC BLOOD PRESSURE: 112 MMHG | RESPIRATION RATE: 17 BRPM | HEIGHT: 63 IN | WEIGHT: 168 LBS | OXYGEN SATURATION: 98 % | TEMPERATURE: 98 F | HEART RATE: 90 BPM

## 2023-07-12 DIAGNOSIS — M79.671 ACUTE FOOT PAIN, RIGHT: Primary | ICD-10-CM

## 2023-07-12 DIAGNOSIS — M79.671 ACUTE FOOT PAIN, RIGHT: ICD-10-CM

## 2023-07-12 PROCEDURE — 1160F PR REVIEW ALL MEDS BY PRESCRIBER/CLIN PHARMACIST DOCUMENTED: ICD-10-PCS | Mod: CPTII,S$GLB,, | Performed by: STUDENT IN AN ORGANIZED HEALTH CARE EDUCATION/TRAINING PROGRAM

## 2023-07-12 PROCEDURE — 3074F SYST BP LT 130 MM HG: CPT | Mod: CPTII,S$GLB,, | Performed by: STUDENT IN AN ORGANIZED HEALTH CARE EDUCATION/TRAINING PROGRAM

## 2023-07-12 PROCEDURE — 3074F PR MOST RECENT SYSTOLIC BLOOD PRESSURE < 130 MM HG: ICD-10-PCS | Mod: CPTII,S$GLB,, | Performed by: STUDENT IN AN ORGANIZED HEALTH CARE EDUCATION/TRAINING PROGRAM

## 2023-07-12 PROCEDURE — 1159F MED LIST DOCD IN RCRD: CPT | Mod: CPTII,S$GLB,, | Performed by: STUDENT IN AN ORGANIZED HEALTH CARE EDUCATION/TRAINING PROGRAM

## 2023-07-12 PROCEDURE — 1160F RVW MEDS BY RX/DR IN RCRD: CPT | Mod: CPTII,S$GLB,, | Performed by: STUDENT IN AN ORGANIZED HEALTH CARE EDUCATION/TRAINING PROGRAM

## 2023-07-12 PROCEDURE — 3008F PR BODY MASS INDEX (BMI) DOCUMENTED: ICD-10-PCS | Mod: CPTII,S$GLB,, | Performed by: STUDENT IN AN ORGANIZED HEALTH CARE EDUCATION/TRAINING PROGRAM

## 2023-07-12 PROCEDURE — 99999 PR PBB SHADOW E&M-EST. PATIENT-LVL V: CPT | Mod: PBBFAC,,, | Performed by: STUDENT IN AN ORGANIZED HEALTH CARE EDUCATION/TRAINING PROGRAM

## 2023-07-12 PROCEDURE — 3078F DIAST BP <80 MM HG: CPT | Mod: CPTII,S$GLB,, | Performed by: STUDENT IN AN ORGANIZED HEALTH CARE EDUCATION/TRAINING PROGRAM

## 2023-07-12 PROCEDURE — 73630 X-RAY EXAM OF FOOT: CPT | Mod: 26,RT,S$GLB, | Performed by: RADIOLOGY

## 2023-07-12 PROCEDURE — 1159F PR MEDICATION LIST DOCUMENTED IN MEDICAL RECORD: ICD-10-PCS | Mod: CPTII,S$GLB,, | Performed by: STUDENT IN AN ORGANIZED HEALTH CARE EDUCATION/TRAINING PROGRAM

## 2023-07-12 PROCEDURE — 3078F PR MOST RECENT DIASTOLIC BLOOD PRESSURE < 80 MM HG: ICD-10-PCS | Mod: CPTII,S$GLB,, | Performed by: STUDENT IN AN ORGANIZED HEALTH CARE EDUCATION/TRAINING PROGRAM

## 2023-07-12 PROCEDURE — 99213 OFFICE O/P EST LOW 20 MIN: CPT | Mod: S$GLB,,, | Performed by: STUDENT IN AN ORGANIZED HEALTH CARE EDUCATION/TRAINING PROGRAM

## 2023-07-12 PROCEDURE — 73630 X-RAY EXAM OF FOOT: CPT | Mod: TC,FY,PO,RT

## 2023-07-12 PROCEDURE — 99999 PR PBB SHADOW E&M-EST. PATIENT-LVL V: ICD-10-PCS | Mod: PBBFAC,,, | Performed by: STUDENT IN AN ORGANIZED HEALTH CARE EDUCATION/TRAINING PROGRAM

## 2023-07-12 PROCEDURE — 3008F BODY MASS INDEX DOCD: CPT | Mod: CPTII,S$GLB,, | Performed by: STUDENT IN AN ORGANIZED HEALTH CARE EDUCATION/TRAINING PROGRAM

## 2023-07-12 PROCEDURE — 99213 PR OFFICE/OUTPT VISIT, EST, LEVL III, 20-29 MIN: ICD-10-PCS | Mod: S$GLB,,, | Performed by: STUDENT IN AN ORGANIZED HEALTH CARE EDUCATION/TRAINING PROGRAM

## 2023-07-12 PROCEDURE — 73630 XR FOOT COMPLETE 3 VIEW RIGHT: ICD-10-PCS | Mod: 26,RT,S$GLB, | Performed by: RADIOLOGY

## 2023-07-12 RX ORDER — IBUPROFEN 600 MG/1
600 TABLET ORAL 3 TIMES DAILY
Qty: 30 TABLET | Refills: 0 | Status: SHIPPED | OUTPATIENT
Start: 2023-07-12 | End: 2023-07-22

## 2023-07-12 NOTE — PATIENT INSTRUCTIONS
Matt Cosby,     If you are due for any health screening(s) below please notify me so we can arrange them to be ordered and scheduled to maintain your health. Most healthy patients complete it. Don't lose out on improving your health.     All of your core healthy metrics are met.              Dear Ms. Barrett:    Your [facility] Team is dedicated to helping you stay healthy with regularly scheduled recommended screenings.  Scheduling routine screenings is important to maintaining good health. Our records indicate that you may be overdue for your screening pap smear. A pap smear screening can help identify patients at risk for developing cervical cancer at an early stage, when it is most likely to be successfully treated.    We encourage you to schedule your appointment with your [facility] provider or some primary care providers also perform this screening.    If you have completed or scheduled your pap smear screening outside of [facility], please notify your primary care team so we can update your health record.      If you have questions or would like to schedule your screening, please contact your primary care clinic.    Sincerely,    Your [facility]

## 2023-07-12 NOTE — PROGRESS NOTES
SUBJECTIVE:    CHIEF COMPLAINT:   Chief Complaint   Patient presents with    Toe Injury     Pt states that last Tuesday she ran into a stationary bike and her right foot pinky toe hit the bike, patient states that the pain level is a 4, pt states that the toe is swollen            274}    HISTORY OF PRESENT ILLNESS:  Alea Barrett is a 33 y.o. female who presents to the clinic today for evaluation right toe pain.  Pain she reports that she sustained the injury on Tuesday (or 1 week ago) while ambulating better parents home.  She stubbed her toe on a stationary bike, noted to have purple toe at that time.  She reports continued pain and soreness in her right toe, now with numbness.  Pain is rated a 4/10 recently was 10/10.  Denies any other concerns at this time      PAST MEDICAL HISTORY:     274}  Past Medical History:   Diagnosis Date    Anxiety     Depression        PAST SURGICAL HISTORY:  Past Surgical History:   Procedure Laterality Date    ear tube      bilateral    EYE SURGERY      WRIST SURGERY  04/2019    Carpal Tunnel       SOCIAL HISTORY:  Social History     Socioeconomic History    Marital status: Single   Tobacco Use    Smoking status: Never    Smokeless tobacco: Never   Substance and Sexual Activity    Alcohol use: No    Drug use: No     Social Determinants of Health     Financial Resource Strain: Low Risk     Difficulty of Paying Living Expenses: Not hard at all   Food Insecurity: No Food Insecurity    Worried About Running Out of Food in the Last Year: Never true    Ran Out of Food in the Last Year: Never true   Transportation Needs: No Transportation Needs    Lack of Transportation (Medical): No    Lack of Transportation (Non-Medical): No   Physical Activity: Insufficiently Active    Days of Exercise per Week: 2 days    Minutes of Exercise per Session: 30 min   Stress: No Stress Concern Present    Feeling of Stress : Not at all   Social Connections: Unknown    Frequency of Communication with Friends  and Family: More than three times a week    Frequency of Social Gatherings with Friends and Family: Twice a week    Active Member of Clubs or Organizations: No    Attends Club or Organization Meetings: Never    Marital Status: Never    Housing Stability: Low Risk     Unable to Pay for Housing in the Last Year: No    Number of Places Lived in the Last Year: 1    Unstable Housing in the Last Year: No       FAMILY HISTORY:       Family History   Problem Relation Age of Onset    Hypertension Mother     Arthritis Mother     Hypertension Father     Arthritis Father     Hyperlipidemia Father     Hypertension Maternal Grandmother     Arthritis Maternal Grandmother     Pacemaker/defibrilator Maternal Grandmother     Hypertension Paternal Grandmother     Diabetes Paternal Grandmother     Arthritis Paternal Grandmother     Hyperlipidemia Paternal Grandmother     Hypertension Maternal Grandfather     Heart disease Maternal Grandfather     Cancer Paternal Grandfather        ALLERGIES AND MEDICATIONS: updated and reviewed.      274}  Review of patient's allergies indicates:   Allergen Reactions    No known drug allergies      Medication List with Changes/Refills   New Medications    IBUPROFEN (ADVIL,MOTRIN) 600 MG TABLET    Take 1 tablet (600 mg total) by mouth 3 (three) times daily. for 10 days   Current Medications    ALBUTEROL (VENTOLIN HFA) 90 MCG/ACTUATION INHALER    Inhale 2 puffs into the lungs every 6 (six) hours as needed for Wheezing. Rescue    ERGOCALCIFEROL (ERGOCALCIFEROL) 50,000 UNIT CAP    Take 1 capsule (50,000 Units total) by mouth every 7 days.    TRIAMCINOLONE ACETONIDE 0.1% (KENALOG) 0.1 % CREAM    Apply twice daily to bump, scaly, inflamed skin only as needed for 2 wk or less.       SCREENING HISTORY:    274}  Health Maintenance         Date Due Completion Date    TETANUS VACCINE 07/20/2019 7/20/2009    Override on 7/20/2009: Done    COVID-19 Vaccine (4 - Pfizer series) 12/30/2021 11/4/2021     "Influenza Vaccine (1) 09/01/2023 9/20/2022    Cervical Cancer Screening 04/10/2026 4/10/2023    Override on 5/30/2017: Done    Override on 3/20/2013: Done (NOT SURE OF THE EXACT DAY/DATE)            REVIEW OF SYSTEMS:   Review of Systems   Constitutional:  Negative for activity change, diaphoresis, fatigue, fever and unexpected weight change.   HENT:  Negative for postnasal drip and rhinorrhea.    Eyes:  Negative for photophobia and visual disturbance.   Respiratory:  Negative for cough, shortness of breath and wheezing.    Cardiovascular:  Negative for chest pain, palpitations and leg swelling.   Gastrointestinal:  Negative for abdominal distention, abdominal pain, constipation, diarrhea, nausea and vomiting.   Genitourinary:  Negative for bladder incontinence, difficulty urinating and frequency.   Musculoskeletal:  Positive for joint swelling (Right toe swelling). Negative for leg pain.   Integumentary:  Negative for pallor and rash.   Neurological:  Negative for dizziness, weakness and numbness.     PHYSICAL EXAM:      274}  /77 (BP Location: Right arm, Patient Position: Sitting, BP Method: Medium (Automatic))   Pulse 90   Temp 98.4 °F (36.9 °C) (Oral)   Resp 17   Ht 5' 3" (1.6 m)   Wt 76.2 kg (167 lb 15.9 oz)   LMP 06/14/2023 (Exact Date)   SpO2 98%   BMI 29.76 kg/m²   Wt Readings from Last 3 Encounters:   07/12/23 76.2 kg (167 lb 15.9 oz)   05/22/23 80.3 kg (177 lb 2.2 oz)   04/07/23 78.9 kg (174 lb)     BP Readings from Last 3 Encounters:   07/12/23 112/77   05/22/23 108/78   04/07/23 (!) 131/90     Estimated body mass index is 29.76 kg/m² as calculated from the following:    Height as of this encounter: 5' 3" (1.6 m).    Weight as of this encounter: 76.2 kg (167 lb 15.9 oz).     Physical Exam  Constitutional:       General: She is not in acute distress.     Appearance: Normal appearance. She is well-developed and normal weight. She is not ill-appearing.   HENT:      Head: Normocephalic and " atraumatic.      Right Ear: External ear normal.      Left Ear: External ear normal.      Nose: Nose normal.   Eyes:      Extraocular Movements: Extraocular movements intact.      Conjunctiva/sclera: Conjunctivae normal.      Pupils: Pupils are equal, round, and reactive to light.   Neck:      Thyroid: No thyroid mass.   Cardiovascular:      Rate and Rhythm: Normal rate and regular rhythm.      Pulses: Normal pulses.      Heart sounds: Normal heart sounds, S1 normal and S2 normal. No murmur heard.    No gallop.   Pulmonary:      Effort: Pulmonary effort is normal. No respiratory distress.      Breath sounds: Normal breath sounds and air entry. No stridor. No wheezing, rhonchi or rales.   Abdominal:      General: Bowel sounds are normal. There is no distension.      Palpations: Abdomen is soft. There is no mass.      Tenderness: There is no abdominal tenderness.   Musculoskeletal:         General: No swelling or deformity.      Cervical back: Normal range of motion and neck supple. No edema or erythema.      Right foot: Decreased range of motion. No deformity or Charcot foot.        Feet:    Feet:      Right foot:      Skin integrity: Erythema present.      Comments: Erythema noted of right 5th toe  Skin:     General: Skin is warm and dry.      Findings: No lesion or rash.   Neurological:      General: No focal deficit present.      Mental Status: She is alert and oriented to person, place, and time. Mental status is at baseline.   Psychiatric:         Mood and Affect: Mood normal.         Behavior: Behavior normal. Behavior is cooperative.         Judgment: Judgment normal.       LABS:   274}  I have reviewed old labs below:  Lab Results   Component Value Date    WBC 6.99 05/22/2023    HGB 12.7 05/22/2023    HCT 39.9 05/22/2023    MCV 89 05/22/2023     05/22/2023     05/22/2023    K 4.0 05/22/2023     05/22/2023    CALCIUM 9.3 05/22/2023    CO2 26 05/22/2023    GLU 93 05/22/2023    BUN 7  05/22/2023    CREATININE 0.8 05/22/2023    ANIONGAP 7 (L) 05/22/2023    ESTGFRAFRICA >60.0 09/27/2021    EGFRNONAA >60.0 09/27/2021    PROT 7.1 05/22/2023    ALBUMIN 4.0 05/22/2023    BILITOT 0.7 05/22/2023    ALKPHOS 74 05/22/2023    ALT 10 05/22/2023    AST 16 05/22/2023    CHOL 187 05/22/2023    TRIG 55 05/22/2023    HDL 74 05/22/2023    LDLCALC 102.0 05/22/2023    TSH 0.475 05/22/2023       ASSESSMENT AND PLAN:  274}  1. Acute foot pain, right  -     X-Ray Foot Complete Right; Future; Expected date: 07/12/2023  -     ibuprofen (ADVIL,MOTRIN) 600 MG tablet; Take 1 tablet (600 mg total) by mouth 3 (three) times daily. for 10 days  Dispense: 30 tablet; Refill: 0           Orders Placed This Encounter   Procedures    X-Ray Foot Complete Right       No follow-ups on file. or sooner as needed.    I spent a total of 25 minutes on the day of the visit.  This includes face to face time and non-face to face time preparing to see the patient (eg, review of tests), obtaining and/or reviewing separately obtained history, documenting clinical information in the electronic or other health record, independently interpreting results and communicating results to the patient/family/caregiver, or care coordinator.

## 2023-10-16 ENCOUNTER — HOSPITAL ENCOUNTER (OUTPATIENT)
Dept: RADIOLOGY | Facility: CLINIC | Age: 33
Discharge: HOME | End: 2023-10-16
Payer: COMMERCIAL

## 2023-10-16 ENCOUNTER — OFFICE VISIT (OUTPATIENT)
Dept: FAMILY MEDICINE | Facility: CLINIC | Age: 33
End: 2023-10-16
Payer: COMMERCIAL

## 2023-10-16 VITALS
DIASTOLIC BLOOD PRESSURE: 74 MMHG | SYSTOLIC BLOOD PRESSURE: 116 MMHG | TEMPERATURE: 99 F | BODY MASS INDEX: 30.94 KG/M2 | WEIGHT: 174.63 LBS | HEART RATE: 94 BPM | OXYGEN SATURATION: 98 % | HEIGHT: 63 IN

## 2023-10-16 DIAGNOSIS — R22.42 MASS OF LEFT FOOT: ICD-10-CM

## 2023-10-16 DIAGNOSIS — R22.42 MASS OF LEFT FOOT: Primary | ICD-10-CM

## 2023-10-16 PROCEDURE — 73630 XR FOOT COMPLETE 3 VIEW LEFT: ICD-10-PCS | Mod: 26,LT,S$GLB, | Performed by: RADIOLOGY

## 2023-10-16 PROCEDURE — 3008F PR BODY MASS INDEX (BMI) DOCUMENTED: ICD-10-PCS | Mod: CPTII,S$GLB,,

## 2023-10-16 PROCEDURE — 1160F PR REVIEW ALL MEDS BY PRESCRIBER/CLIN PHARMACIST DOCUMENTED: ICD-10-PCS | Mod: CPTII,S$GLB,,

## 2023-10-16 PROCEDURE — 99999 PR PBB SHADOW E&M-EST. PATIENT-LVL IV: ICD-10-PCS | Mod: PBBFAC,,,

## 2023-10-16 PROCEDURE — 1159F PR MEDICATION LIST DOCUMENTED IN MEDICAL RECORD: ICD-10-PCS | Mod: CPTII,S$GLB,,

## 2023-10-16 PROCEDURE — 99999 PR PBB SHADOW E&M-EST. PATIENT-LVL IV: CPT | Mod: PBBFAC,,,

## 2023-10-16 PROCEDURE — 99213 PR OFFICE/OUTPT VISIT, EST, LEVL III, 20-29 MIN: ICD-10-PCS | Mod: S$GLB,,,

## 2023-10-16 PROCEDURE — 73630 X-RAY EXAM OF FOOT: CPT | Mod: 26,LT,S$GLB, | Performed by: RADIOLOGY

## 2023-10-16 PROCEDURE — 3074F PR MOST RECENT SYSTOLIC BLOOD PRESSURE < 130 MM HG: ICD-10-PCS | Mod: CPTII,S$GLB,,

## 2023-10-16 PROCEDURE — 99213 OFFICE O/P EST LOW 20 MIN: CPT | Mod: S$GLB,,,

## 2023-10-16 PROCEDURE — 73630 X-RAY EXAM OF FOOT: CPT | Mod: TC,FY,PO,LT

## 2023-10-16 PROCEDURE — 3078F PR MOST RECENT DIASTOLIC BLOOD PRESSURE < 80 MM HG: ICD-10-PCS | Mod: CPTII,S$GLB,,

## 2023-10-16 PROCEDURE — 3008F BODY MASS INDEX DOCD: CPT | Mod: CPTII,S$GLB,,

## 2023-10-16 PROCEDURE — 3078F DIAST BP <80 MM HG: CPT | Mod: CPTII,S$GLB,,

## 2023-10-16 PROCEDURE — 3074F SYST BP LT 130 MM HG: CPT | Mod: CPTII,S$GLB,,

## 2023-10-16 PROCEDURE — 1160F RVW MEDS BY RX/DR IN RCRD: CPT | Mod: CPTII,S$GLB,,

## 2023-10-16 PROCEDURE — 1159F MED LIST DOCD IN RCRD: CPT | Mod: CPTII,S$GLB,,

## 2023-10-16 NOTE — PROGRESS NOTES
Subjective:       Patient ID: Alea Barrett is a 33 y.o. female.    Chief Complaint: mass of left foot     Alea Barrett is a 33 y.o. female who presents to clinic for evaluation of a lump on her left foot arch. She is unsure how long it has been present for, but it has recently started causing her some discomfort. No known injury to the left foot.         Review of Systems   Constitutional:  Negative for fatigue.   Respiratory:  Negative for cough and shortness of breath.    Cardiovascular:  Negative for chest pain and palpitations.   Gastrointestinal:  Negative for abdominal pain.   Musculoskeletal:         Left foot mass   Skin:  Negative for rash.   Neurological:  Negative for headaches.         Past Medical History:   Diagnosis Date    Anxiety     Depression        Review of patient's allergies indicates:   Allergen Reactions    No known drug allergies          Current Outpatient Medications:     ergocalciferol (ERGOCALCIFEROL) 50,000 unit Cap, Take 1 capsule (50,000 Units total) by mouth every 7 days., Disp: 12 capsule, Rfl: 1    levonorgestreL (MIRENA) 21 mcg/24 hours (8 yrs) 52 mg IUD, 1 each by Intrauterine route once., Disp: , Rfl:     albuterol (VENTOLIN HFA) 90 mcg/actuation inhaler, Inhale 2 puffs into the lungs every 6 (six) hours as needed for Wheezing. Rescue (Patient not taking: Reported on 10/16/2023), Disp: 18 g, Rfl: 0    triamcinolone acetonide 0.1% (KENALOG) 0.1 % cream, Apply twice daily to bump, scaly, inflamed skin only as needed for 2 wk or less. (Patient not taking: Reported on 10/16/2023), Disp: 453 g, Rfl: 1    Objective:        Physical Exam  Vitals reviewed.   Constitutional:       Appearance: Normal appearance.   HENT:      Head: Normocephalic and atraumatic.   Eyes:      Conjunctiva/sclera: Conjunctivae normal.   Cardiovascular:      Rate and Rhythm: Normal rate.   Pulmonary:      Effort: Pulmonary effort is normal.   Musculoskeletal:         General: Normal range of motion.       "Cervical back: Normal range of motion.        Feet:    Skin:     General: Skin is warm and dry.   Neurological:      Mental Status: She is alert and oriented to person, place, and time.           Visit Vitals  /74   Pulse 94   Temp 98.8 °F (37.1 °C)   Ht 5' 3" (1.6 m)   Wt 79.2 kg (174 lb 9.7 oz)   SpO2 98%   BMI 30.93 kg/m²      Assessment:         1. Mass of left foot        Plan:         Diagnoses and all orders for this visit:    Mass of left foot        -     Suspect plantar fibroma   -     X-Ray Foot Complete Left; Future to r/o foreign body   -     Ambulatory referral/consult to Podiatry; Future          Family Medicine Physician Assistant     Future Appointments       Date Provider Specialty Appt Notes    5/22/2024 Makeda Reeder MD Family Medicine annual             All of your core healthy metrics are met.       I spent a total of 15 minutes on the day of the visit.This includes face to face time and non-face to face time preparing to see the patient (eg, review of tests), obtaining and/or reviewing separately obtained history, documenting clinical information in the electronic or other health record, independently interpreting results and communicating results to the patient/family/caregiver, or care coordinator.    We have addressed [3] Low: 2 or more self-limited or minor problems / 1 stable chronic illness / 1 acute, uncomplicated illness or injury  The complexity of the data reviewed and analyzed for this visit was [3] Limited (Reviewed prior external note, ordered unique testing or reviewed the results of each unique test)   The risk of complications and/or morbidity or mortality are [3] Low risk   The level of Medical Decision Making for this visit is [3] Low    "

## 2023-10-27 ENCOUNTER — OFFICE VISIT (OUTPATIENT)
Dept: URGENT CARE | Facility: CLINIC | Age: 33
End: 2023-10-27
Payer: COMMERCIAL

## 2023-10-27 VITALS
BODY MASS INDEX: 30.12 KG/M2 | WEIGHT: 170 LBS | TEMPERATURE: 99 F | DIASTOLIC BLOOD PRESSURE: 83 MMHG | SYSTOLIC BLOOD PRESSURE: 123 MMHG | RESPIRATION RATE: 16 BRPM | HEART RATE: 100 BPM | OXYGEN SATURATION: 98 % | HEIGHT: 63 IN

## 2023-10-27 DIAGNOSIS — J02.8 BACTERIAL PHARYNGITIS: Primary | ICD-10-CM

## 2023-10-27 DIAGNOSIS — J02.9 SORE THROAT: ICD-10-CM

## 2023-10-27 DIAGNOSIS — B96.89 BACTERIAL PHARYNGITIS: Primary | ICD-10-CM

## 2023-10-27 DIAGNOSIS — Z20.822 COVID-19 VIRUS NOT DETECTED: ICD-10-CM

## 2023-10-27 LAB
CTP QC/QA: YES
CTP QC/QA: YES
S PYO RRNA THROAT QL PROBE: NEGATIVE
SARS-COV-2 AG RESP QL IA.RAPID: NEGATIVE

## 2023-10-27 PROCEDURE — 87811 SARS CORONAVIRUS 2 ANTIGEN POCT, MANUAL READ: ICD-10-PCS | Mod: QW,S$GLB,,

## 2023-10-27 PROCEDURE — 99214 PR OFFICE/OUTPT VISIT, EST, LEVL IV, 30-39 MIN: ICD-10-PCS | Mod: S$GLB,,,

## 2023-10-27 PROCEDURE — 87811 SARS-COV-2 COVID19 W/OPTIC: CPT | Mod: QW,S$GLB,,

## 2023-10-27 PROCEDURE — 87880 POCT RAPID STREP A: ICD-10-PCS | Mod: QW,,,

## 2023-10-27 PROCEDURE — 99214 OFFICE O/P EST MOD 30 MIN: CPT | Mod: S$GLB,,,

## 2023-10-27 PROCEDURE — 87880 STREP A ASSAY W/OPTIC: CPT | Mod: QW,,,

## 2023-10-27 RX ORDER — AMOXICILLIN 500 MG/1
500 TABLET, FILM COATED ORAL EVERY 12 HOURS
Qty: 20 TABLET | Refills: 0 | Status: SHIPPED | OUTPATIENT
Start: 2023-10-27 | End: 2023-11-06

## 2023-10-27 NOTE — LETTER
October 27, 2023      [facility]  [address] [id number]       Patient: Alea Barrett   YOB: 1990  Date of Visit: 10/27/2023    To Whom It May Concern:    Matias Barrett  was at [facility] on 10/27/2023. The patient may return to work/school on 10/29/23 with no restrictions. If you have any questions or concerns, or if I can be of further assistance, please do not hesitate to contact me.    Sincerely,    SAJAN Donahue

## 2023-10-27 NOTE — PROGRESS NOTES
"Subjective:      Patient ID: Alea Barrett is a 33 y.o. female.    Vitals:  height is 5' 3" (1.6 m) and weight is 77.1 kg (170 lb). Her temperature is 98.9 °F (37.2 °C). Her blood pressure is 123/83 and her pulse is 100. Her respiration is 16 and oxygen saturation is 98%.     Chief Complaint: Sore Throat    Alea, past medical history of headaches, anxiety, presents to clinic with a chief complaint sore throat that began yesterday.  Fever 100.8 this morning.  No coughing.  Painful swallowing.  No other sick contacts.  Centor score 4      Sore Throat   This is a new problem. The current episode started yesterday. The problem has been unchanged. The maximum temperature recorded prior to her arrival was 100.4 - 100.9 F. Associated symptoms include headaches, a hoarse voice, swollen glands and trouble swallowing. Pertinent negatives include no congestion, coughing, diarrhea, plugged ear sensation or vomiting. She has tried acetaminophen for the symptoms. The treatment provided mild relief.       Constitution: Positive for fever.   HENT:  Positive for sore throat, trouble swallowing and voice change. Negative for congestion.    Neck: Positive for painful lymph nodes.   Cardiovascular:  Negative for palpitations.   Eyes:  Negative for blurred vision.   Respiratory:  Negative for cough.    Gastrointestinal:  Negative for nausea, vomiting and diarrhea.   Endocrine: cold intolerance and heat intolerance.   Genitourinary:  Negative for dysuria and frequency.   Musculoskeletal:  Negative for muscle ache.   Skin:  Negative for rash.   Allergic/Immunologic: Negative for environmental allergies, seasonal allergies and food allergies.   Neurological:  Positive for headaches. Negative for altered mental status.   Hematologic/Lymphatic: Positive for swollen lymph nodes.   Psychiatric/Behavioral:  Negative for altered mental status.       Objective:     Physical Exam   Constitutional: She is oriented to person, place, and time. She " appears well-developed. She is cooperative.  Non-toxic appearance. She appears ill. No distress. normal  HENT:   Head: Normocephalic and atraumatic.   Ears:   Right Ear: Hearing, tympanic membrane, external ear and ear canal normal.   Left Ear: Hearing, tympanic membrane, external ear and ear canal normal.   Nose: Nose normal. No mucosal edema or nasal deformity. No epistaxis. Right sinus exhibits no maxillary sinus tenderness and no frontal sinus tenderness. Left sinus exhibits no maxillary sinus tenderness and no frontal sinus tenderness.   Mouth/Throat: Uvula is midline and mucous membranes are normal. Mucous membranes are moist. No trismus in the jaw. Normal dentition. No uvula swelling. Oropharyngeal exudate and posterior oropharyngeal erythema present. Tonsillar exudate.   Petechial rash oropharynx      Comments: Petechial rash oropharynx  Eyes: Conjunctivae and lids are normal. Pupils are equal, round, and reactive to light. Extraocular movement intact   Neck: Trachea normal. Neck supple.   Cardiovascular: Normal rate, regular rhythm, normal heart sounds and normal pulses.   Pulmonary/Chest: Effort normal and breath sounds normal.   Abdominal: Normal appearance and bowel sounds are normal. Soft.   Musculoskeletal: Normal range of motion.         General: Normal range of motion.   Lymphadenopathy:     She has cervical adenopathy.        Right cervical: Superficial cervical adenopathy present.        Left cervical: Superficial cervical adenopathy present.   Neurological: She is alert and oriented to person, place, and time. She exhibits normal muscle tone.   Skin: Skin is warm, dry, intact and not diaphoretic.   Psychiatric: Her speech is normal and behavior is normal. Judgment and thought content normal.   Nursing note and vitals reviewed.      Assessment:     1. Bacterial pharyngitis    2. Sore throat    3. COVID-19 virus not detected        Plan:       Bacterial pharyngitis  -     amoxicillin (AMOXIL) 500 MG  Tab; Take 1 tablet (500 mg total) by mouth every 12 (twelve) hours. for 10 days  Dispense: 20 tablet; Refill: 0    Sore throat  -     SARS Coronavirus 2 Antigen, POCT Manual Read  -     POCT rapid strep A    COVID-19 virus not detected

## 2023-10-27 NOTE — PATIENT INSTRUCTIONS
Warm saltwater gargles, cough drops, humidified air  Replace toothbrush in 24 hours and then again end of treatment

## 2023-10-31 ENCOUNTER — OFFICE VISIT (OUTPATIENT)
Dept: PODIATRY | Facility: CLINIC | Age: 33
End: 2023-10-31
Payer: COMMERCIAL

## 2023-10-31 VITALS — HEIGHT: 63 IN | WEIGHT: 180.75 LBS | BODY MASS INDEX: 32.03 KG/M2 | HEART RATE: 87 BPM | OXYGEN SATURATION: 99 %

## 2023-10-31 DIAGNOSIS — M72.2 PLANTAR FASCIAL FIBROMATOSIS OF LEFT FOOT: Primary | ICD-10-CM

## 2023-10-31 DIAGNOSIS — R22.42 MASS OF LEFT FOOT: ICD-10-CM

## 2023-10-31 DIAGNOSIS — M79.672 LEFT FOOT PAIN: ICD-10-CM

## 2023-10-31 PROCEDURE — 1159F PR MEDICATION LIST DOCUMENTED IN MEDICAL RECORD: ICD-10-PCS | Mod: CPTII,S$GLB,, | Performed by: PODIATRIST

## 2023-10-31 PROCEDURE — 99203 PR OFFICE/OUTPT VISIT, NEW, LEVL III, 30-44 MIN: ICD-10-PCS | Mod: S$GLB,,, | Performed by: PODIATRIST

## 2023-10-31 PROCEDURE — 99999 PR PBB SHADOW E&M-EST. PATIENT-LVL III: ICD-10-PCS | Mod: PBBFAC,,, | Performed by: PODIATRIST

## 2023-10-31 PROCEDURE — 1160F RVW MEDS BY RX/DR IN RCRD: CPT | Mod: CPTII,S$GLB,, | Performed by: PODIATRIST

## 2023-10-31 PROCEDURE — 3008F BODY MASS INDEX DOCD: CPT | Mod: CPTII,S$GLB,, | Performed by: PODIATRIST

## 2023-10-31 PROCEDURE — 1160F PR REVIEW ALL MEDS BY PRESCRIBER/CLIN PHARMACIST DOCUMENTED: ICD-10-PCS | Mod: CPTII,S$GLB,, | Performed by: PODIATRIST

## 2023-10-31 PROCEDURE — 1159F MED LIST DOCD IN RCRD: CPT | Mod: CPTII,S$GLB,, | Performed by: PODIATRIST

## 2023-10-31 PROCEDURE — 3008F PR BODY MASS INDEX (BMI) DOCUMENTED: ICD-10-PCS | Mod: CPTII,S$GLB,, | Performed by: PODIATRIST

## 2023-10-31 PROCEDURE — 99203 OFFICE O/P NEW LOW 30 MIN: CPT | Mod: S$GLB,,, | Performed by: PODIATRIST

## 2023-10-31 PROCEDURE — 99999 PR PBB SHADOW E&M-EST. PATIENT-LVL III: CPT | Mod: PBBFAC,,, | Performed by: PODIATRIST

## 2023-10-31 NOTE — PROGRESS NOTES
"  [address] [facility] [address] [place] [address]  Phone: [phone]   [phone]    Patient's PCP:Makeda Reeder MD  Referring Provider: Juana Nolan    Subjective:      Chief Complaint:: Foot Pain (Bottom of left foot)    KELSI Barrett is a 33 y.o. female who presents today with a complaint of "ball" under the left foot. The current episode started about 2.5 months ago.  The symptoms include pulling when standing on foot. Probable cause of complaint none.  The symptoms are aggravated by standing on toes, standing for long periods of time and after sitting. The problem has worsened. Treatment to date have included went to pcp who palpated and took X-ray,  which provided no relief.       Vitals:    10/31/23 1434   Pulse: 87   SpO2: 99%   Weight: 82 kg (180 lb 12.4 oz)   Height: 5' 3" (1.6 m)   PainSc:   2      Shoe Size: 7-7.5    Past Surgical History:   Procedure Laterality Date    ear tube      bilateral    EYE SURGERY      WRIST SURGERY  04/2019    Carpal Tunnel     Past Medical History:   Diagnosis Date    Anxiety     Depression      Family History   Problem Relation Age of Onset    Hypertension Mother     Arthritis Mother     Hypertension Father     Arthritis Father     Hyperlipidemia Father     Hypertension Maternal Grandmother     Arthritis Maternal Grandmother     Pacemaker/defibrilator Maternal Grandmother     Hypertension Paternal Grandmother     Diabetes Paternal Grandmother     Arthritis Paternal Grandmother     Hyperlipidemia Paternal Grandmother     Hypertension Maternal Grandfather     Heart disease Maternal Grandfather     Cancer Paternal Grandfather         Social History:   Marital Status: Single  Alcohol History:  reports no history of alcohol use.  Tobacco History:  reports that she has never smoked. She has never used smokeless tobacco.  Drug History:  reports no history of drug use.    Review of patient's allergies indicates:   Allergen Reactions    No known drug allergies      "   Current Outpatient Medications   Medication Sig Dispense Refill    amoxicillin (AMOXIL) 500 MG Tab Take 1 tablet (500 mg total) by mouth every 12 (twelve) hours. for 10 days 20 tablet 0    ergocalciferol (ERGOCALCIFEROL) 50,000 unit Cap Take 1 capsule (50,000 Units total) by mouth every 7 days. 12 capsule 1    levonorgestreL (MIRENA) 21 mcg/24 hours (8 yrs) 52 mg IUD 1 each by Intrauterine route once.       No current facility-administered medications for this visit.       Review of Systems   Constitutional:  Negative for chills, fatigue, fever and unexpected weight change.   HENT:  Negative for hearing loss and trouble swallowing.    Eyes:  Negative for photophobia and visual disturbance.   Respiratory:  Negative for cough, shortness of breath and wheezing.    Cardiovascular:  Negative for chest pain, palpitations and leg swelling.   Gastrointestinal:  Negative for abdominal pain and nausea.   Genitourinary:  Negative for dysuria and frequency.   Musculoskeletal:  Negative for arthralgias, back pain, gait problem, joint swelling and myalgias.   Skin:  Negative for rash and wound.   Neurological:  Negative for seizures, weakness, numbness and headaches.   Hematological:  Does not bruise/bleed easily.         Objective:        Physical Exam:   Foot Exam    General  General Appearance: appears stated age and healthy   Orientation: alert and oriented to person, place, and time   Affect: appropriate   Gait: unimpaired       Left Foot/Ankle      Inspection and Palpation  Ecchymosis: none  Tenderness: plantar fascia (mild)  Swelling: none   Arch: normal  Hammertoes: absent  Claw toes: absent  Hallux valgus: no  Hallux limitus: no  Skin Exam: skin intact; no drainage, no ulcer and no erythema   Neurovascular  Dorsalis pedis: 2+  Posterior tibial: 2+  Capillary refill: 2+  Varicose veins: not present  Saphenous nerve sensation: normal  Tibial nerve sensation: normal  Superficial peroneal nerve sensation: normal  Deep  peroneal nerve sensation: normal  Sural nerve sensation: normal    Muscle Strength  Ankle dorsiflexion: 5  Ankle plantar flexion: 5  Ankle inversion: 5  Ankle eversion: 5  Great toe extension: 5  Great toe flexion: 5    Range of Motion    Normal left ankle ROM    Tests  Anterior drawer: negative   Talar tilt: negative   PT Tinel's sign: negative  Paresthesia: negative      Physical Exam  Cardiovascular:      Pulses:           Dorsalis pedis pulses are 2+ on the left side.        Posterior tibial pulses are 2+ on the left side.   Musculoskeletal:      Left foot: No bunion.        Feet:    Feet:      Left foot:      Skin integrity: No ulcer or erythema.               Left Ankle/Foot Exam     Range of Motion   The patient has normal left ankle ROM.       Muscle Strength   Left Lower Extremity   Ankle Dorsiflexion:  5   Plantar flexion:  5/5     Vascular Exam       Left Pulses  Dorsalis Pedis:      2+  Posterior Tibial:      2+           Imaging: X-Ray Foot Complete Left  Narrative: EXAMINATION:  XR FOOT COMPLETE 3 VIEW LEFT    CLINICAL HISTORY:  .  Localized swelling, mass and lump, left lower limb    TECHNIQUE:  AP, lateral and oblique views of the left foot were performed.    COMPARISON:  None    FINDINGS:  Os peroneum is present.  Small superior calcaneal enthesophyte.  No acute, displaced fracture, aggressive osseous abnormality or dislocation.  No focal soft tissue abnormality.  No radiopaque foreign body.  Impression: No acute osseous abnormality.    Electronically signed by: Thania Rollins  Date:    10/16/2023  Time:    09:49               Assessment:       1. Plantar fascial fibromatosis of left foot    2. Mass of left foot      Plan:   Plantar fascial fibromatosis of left foot    Mass of left foot  -     Ambulatory referral/consult to Podiatry      Follow up if symptoms worsen or fail to improve.    Procedures        I discussed plantar fibroma and that the lesion is benign and usually does not need surgery.  Conservative treatment is ice, good shoes, topical medications, steroid shots.  If it becomes larger, more painful causing difficulty with normal gait and wearing she gear or if there is rapid growth then surgical excision is indicated.      Counseling:     I provided patient education verbally regarding:   Patient diagnosis, treatment options, as well as alternatives, risks, and benefits.     This note was created using Dragon voice recognition software that occasionally misinterpreted phrases or words.

## 2023-12-11 ENCOUNTER — OFFICE VISIT (OUTPATIENT)
Dept: PODIATRY | Facility: CLINIC | Age: 33
End: 2023-12-11
Payer: COMMERCIAL

## 2023-12-11 VITALS — HEIGHT: 63 IN | WEIGHT: 181.69 LBS | BODY MASS INDEX: 32.19 KG/M2

## 2023-12-11 DIAGNOSIS — R22.42 MASS OF LEFT FOOT: ICD-10-CM

## 2023-12-11 DIAGNOSIS — M72.2 PLANTAR FASCIAL FIBROMATOSIS OF LEFT FOOT: Primary | ICD-10-CM

## 2023-12-11 DIAGNOSIS — M79.672 LEFT FOOT PAIN: ICD-10-CM

## 2023-12-11 PROCEDURE — 99213 PR OFFICE/OUTPT VISIT, EST, LEVL III, 20-29 MIN: ICD-10-PCS | Mod: S$GLB,,, | Performed by: PODIATRIST

## 2023-12-11 PROCEDURE — 99213 OFFICE O/P EST LOW 20 MIN: CPT | Mod: S$GLB,,, | Performed by: PODIATRIST

## 2023-12-11 PROCEDURE — 99999 PR PBB SHADOW E&M-EST. PATIENT-LVL III: CPT | Mod: PBBFAC,,, | Performed by: PODIATRIST

## 2023-12-11 PROCEDURE — 99999 PR PBB SHADOW E&M-EST. PATIENT-LVL III: ICD-10-PCS | Mod: PBBFAC,,, | Performed by: PODIATRIST

## 2023-12-11 PROCEDURE — 1160F PR REVIEW ALL MEDS BY PRESCRIBER/CLIN PHARMACIST DOCUMENTED: ICD-10-PCS | Mod: CPTII,S$GLB,, | Performed by: PODIATRIST

## 2023-12-11 PROCEDURE — 1159F PR MEDICATION LIST DOCUMENTED IN MEDICAL RECORD: ICD-10-PCS | Mod: CPTII,S$GLB,, | Performed by: PODIATRIST

## 2023-12-11 PROCEDURE — 1160F RVW MEDS BY RX/DR IN RCRD: CPT | Mod: CPTII,S$GLB,, | Performed by: PODIATRIST

## 2023-12-11 PROCEDURE — 3008F BODY MASS INDEX DOCD: CPT | Mod: CPTII,S$GLB,, | Performed by: PODIATRIST

## 2023-12-11 PROCEDURE — 1159F MED LIST DOCD IN RCRD: CPT | Mod: CPTII,S$GLB,, | Performed by: PODIATRIST

## 2023-12-11 PROCEDURE — 3008F PR BODY MASS INDEX (BMI) DOCUMENTED: ICD-10-PCS | Mod: CPTII,S$GLB,, | Performed by: PODIATRIST

## 2023-12-11 RX ORDER — MELOXICAM 15 MG/1
15 TABLET ORAL DAILY
Qty: 30 TABLET | Refills: 1 | Status: SHIPPED | OUTPATIENT
Start: 2023-12-11 | End: 2024-02-09

## 2023-12-11 RX ORDER — METHYLPREDNISOLONE 4 MG/1
TABLET ORAL
Qty: 21 EACH | Refills: 0 | Status: SHIPPED | OUTPATIENT
Start: 2023-12-11 | End: 2024-01-01

## 2023-12-11 NOTE — PROGRESS NOTES
"  [address] [facility] [address] [place] [address]  Phone: [phone]   [phone]    Patient's PCP:Makeda Reeder MD  Referring Provider: No ref. provider found    Subjective:      Chief Complaint:: Follow-up (Plantar fascial fibromatosis of left foot)    Follow-up  Pertinent negatives include no abdominal pain, arthralgias, chest pain, chills, coughing, fatigue, fever, headaches, joint swelling, myalgias, nausea, numbness, rash or weakness.     Alea Barrett is a 33 y.o. female who presents today with a complaint of plantar fibroma left foot . The current episode started a few months ago.  The symptoms include pulling when standing on foot that is getting worse. Probable cause of complaint Plantar fascial fibromatosis .  The symptoms are aggravated by walking a certain way standing on toes, standing for long periods of time and after sitting  . The problem has worsened. Treatment to date have included different shoes and icing foot when needed which provided no relief.         Vitals:    12/11/23 1605   Weight: 82.4 kg (181 lb 10.5 oz)   Height: 5' 3" (1.6 m)   PainSc:   5      Shoe Size: 7-7.5    Past Surgical History:   Procedure Laterality Date    ear tube      bilateral    EYE SURGERY      WRIST SURGERY  04/2019    Carpal Tunnel     Past Medical History:   Diagnosis Date    Anxiety     Depression      Family History   Problem Relation Age of Onset    Hypertension Mother     Arthritis Mother     Hypertension Father     Arthritis Father     Hyperlipidemia Father     Hypertension Maternal Grandmother     Arthritis Maternal Grandmother     Pacemaker/defibrilator Maternal Grandmother     Hypertension Paternal Grandmother     Diabetes Paternal Grandmother     Arthritis Paternal Grandmother     Hyperlipidemia Paternal Grandmother     Hypertension Maternal Grandfather     Heart disease Maternal Grandfather     Cancer Paternal Grandfather         Social History:   Marital Status: Single  Alcohol History:  " reports no history of alcohol use.  Tobacco History:  reports that she has never smoked. She has never used smokeless tobacco.  Drug History:  reports no history of drug use.    Review of patient's allergies indicates:   Allergen Reactions    No known drug allergies        Current Outpatient Medications   Medication Sig Dispense Refill    acetaminophen (TYLENOL) 80 MG Chew Take by mouth.      levonorgestreL (MIRENA) 21 mcg/24 hours (8 yrs) 52 mg IUD 1 each by Intrauterine route once.      meloxicam (MOBIC) 15 MG tablet Take 1 tablet (15 mg total) by mouth once daily. 30 tablet 1    methylPREDNISolone (MEDROL DOSEPACK) 4 mg tablet use as directed 21 each 0     No current facility-administered medications for this visit.       Review of Systems   Constitutional:  Negative for chills, fatigue, fever and unexpected weight change.   HENT:  Negative for hearing loss and trouble swallowing.    Eyes:  Negative for photophobia and visual disturbance.   Respiratory:  Negative for cough, shortness of breath and wheezing.    Cardiovascular:  Negative for chest pain, palpitations and leg swelling.   Gastrointestinal:  Negative for abdominal pain and nausea.   Genitourinary:  Negative for dysuria and frequency.   Musculoskeletal:  Negative for arthralgias, back pain, gait problem, joint swelling and myalgias.   Skin:  Negative for rash and wound.   Neurological:  Negative for seizures, weakness, numbness and headaches.   Hematological:  Does not bruise/bleed easily.         Objective:        Physical Exam:   Foot Exam    General  General Appearance: appears stated age and healthy   Orientation: alert and oriented to person, place, and time   Affect: appropriate   Gait: unimpaired       Left Foot/Ankle      Inspection and Palpation  Ecchymosis: none  Tenderness: plantar fascia   Swelling: none   Arch: normal  Hammertoes: absent  Claw toes: absent  Hallux valgus: no  Hallux limitus: no  Skin Exam: skin intact; no drainage, no ulcer  and no erythema   Neurovascular  Dorsalis pedis: 2+  Posterior tibial: 2+  Capillary refill: 2+  Varicose veins: not present  Saphenous nerve sensation: normal  Tibial nerve sensation: normal  Superficial peroneal nerve sensation: normal  Deep peroneal nerve sensation: normal  Sural nerve sensation: normal    Muscle Strength  Ankle dorsiflexion: 5  Ankle plantar flexion: 5  Ankle inversion: 5  Ankle eversion: 5  Great toe extension: 5  Great toe flexion: 5    Range of Motion    Normal left ankle ROM    Tests  Anterior drawer: negative   Talar tilt: negative   PT Tinel's sign: negative  Paresthesia: negative      Physical Exam  Cardiovascular:      Pulses:           Dorsalis pedis pulses are 2+ on the left side.        Posterior tibial pulses are 2+ on the left side.   Musculoskeletal:      Left foot: No bunion.   Feet:      Left foot:      Skin integrity: No ulcer or erythema.               Left Ankle/Foot Exam     Range of Motion   The patient has normal left ankle ROM.       Muscle Strength   Left Lower Extremity   Ankle Dorsiflexion:  5   Plantar flexion:  5/5     Vascular Exam       Left Pulses  Dorsalis Pedis:      2+  Posterior Tibial:      2+           Imaging:            Assessment:       1. Plantar fascial fibromatosis of left foot    2. Mass of left foot    3. Left foot pain      Plan:   Plantar fascial fibromatosis of left foot  -     methylPREDNISolone (MEDROL DOSEPACK) 4 mg tablet; use as directed  Dispense: 21 each; Refill: 0  -     meloxicam (MOBIC) 15 MG tablet; Take 1 tablet (15 mg total) by mouth once daily.  Dispense: 30 tablet; Refill: 1  -     AIR CAST WALKER BOOT FOR HOME USE    Mass of left foot  -     AIR CAST WALKER BOOT FOR HOME USE    Left foot pain  -     methylPREDNISolone (MEDROL DOSEPACK) 4 mg tablet; use as directed  Dispense: 21 each; Refill: 0  -     meloxicam (MOBIC) 15 MG tablet; Take 1 tablet (15 mg total) by mouth once daily.  Dispense: 30 tablet; Refill: 1  -     AIR CAST  WALKER BOOT FOR HOME USE      Follow up if symptoms worsen or fail to improve.    Procedures        We discussed different ongoing treatment options for her plantar fibroma.  Unfortunately, with her job she is required to stand her feet for many hours a day.  At this time I recommend immobilization in Cam Walker boot to limit stress on the plantar fascia.  We also discussed different ways less than the inflammation.  I am going to send in a Medrol Dosepak for her.  I am also going to send him meloxicam take as needed once she has finished the Medrol Dosepak.    Counseling:     I provided patient education verbally regarding:   Patient diagnosis, treatment options, as well as alternatives, risks, and benefits.     This note was created using Dragon voice recognition software that occasionally misinterpreted phrases or words.

## 2024-04-06 ENCOUNTER — OFFICE VISIT (OUTPATIENT)
Dept: URGENT CARE | Facility: CLINIC | Age: 34
End: 2024-04-06
Payer: COMMERCIAL

## 2024-04-06 VITALS
WEIGHT: 181 LBS | HEIGHT: 63 IN | RESPIRATION RATE: 17 BRPM | HEART RATE: 81 BPM | SYSTOLIC BLOOD PRESSURE: 121 MMHG | DIASTOLIC BLOOD PRESSURE: 74 MMHG | TEMPERATURE: 98 F | BODY MASS INDEX: 32.07 KG/M2 | OXYGEN SATURATION: 100 %

## 2024-04-06 DIAGNOSIS — H66.93 BILATERAL ACUTE OTITIS MEDIA: Primary | ICD-10-CM

## 2024-04-06 DIAGNOSIS — J06.9 URI WITH COUGH AND CONGESTION: ICD-10-CM

## 2024-04-06 LAB
CTP QC/QA: YES
SARS-COV-2 AG RESP QL IA.RAPID: NEGATIVE

## 2024-04-06 PROCEDURE — 99213 OFFICE O/P EST LOW 20 MIN: CPT | Mod: S$GLB,,, | Performed by: NURSE PRACTITIONER

## 2024-04-06 PROCEDURE — 87811 SARS-COV-2 COVID19 W/OPTIC: CPT | Mod: QW,S$GLB,, | Performed by: NURSE PRACTITIONER

## 2024-04-06 RX ORDER — CEFDINIR 300 MG/1
300 CAPSULE ORAL 2 TIMES DAILY
Qty: 20 CAPSULE | Refills: 0 | Status: SHIPPED | OUTPATIENT
Start: 2024-04-06 | End: 2024-04-16

## 2024-04-06 RX ORDER — IBUPROFEN 800 MG/1
800 TABLET ORAL EVERY 6 HOURS PRN
Qty: 30 TABLET | Refills: 0 | Status: SHIPPED | OUTPATIENT
Start: 2024-04-06

## 2024-04-06 RX ORDER — BENZONATATE 200 MG/1
200 CAPSULE ORAL EVERY 8 HOURS PRN
Qty: 30 CAPSULE | Refills: 0 | Status: SHIPPED | OUTPATIENT
Start: 2024-04-06

## 2024-04-06 NOTE — PROGRESS NOTES
"Subjective:      Patient ID: Alea Barrett is a 34 y.o. female.    Vitals:  height is 5' 3" (1.6 m) and weight is 82.1 kg (181 lb). Her temperature is 98.2 °F (36.8 °C). Her blood pressure is 121/74 and her pulse is 81. Her respiration is 17 and oxygen saturation is 100%.     Chief Complaint: Ear Problem (Entered by patient)    This is a 34 y.o. female who presents today with a chief complaint of bilateral ear pain, cough, congestion, headache, and sore throat. Symptoms for  3 days. Taking Tylenol    Otalgia   There is pain in both ears. This is a new problem. The current episode started in the past 7 days. The problem occurs constantly. The problem has been gradually worsening. The maximum temperature recorded prior to her arrival was 101 - 101.9 F. The fever has been present for Less than 1 day. The pain is at a severity of 5/10. The pain is moderate. Associated symptoms include coughing, headaches and a sore throat. Pertinent negatives include no diarrhea or vomiting. Associated symptoms comments: congestion. She has tried acetaminophen for the symptoms. The treatment provided no relief.       Constitution: Positive for fatigue. Negative for fever.   HENT:  Positive for ear pain, congestion and sore throat.    Respiratory:  Positive for cough. Negative for shortness of breath and wheezing.    Gastrointestinal:  Negative for vomiting and diarrhea.   Neurological:  Positive for headaches.      Objective:     Physical Exam   Constitutional: She is oriented to person, place, and time. She appears well-developed. She is cooperative.  Non-toxic appearance. She does not appear ill. No distress.   HENT:   Head: Normocephalic.   Ears:   Right Ear: Hearing, external ear and ear canal normal. Tympanic membrane is bulging. Tympanic membrane is not erythematous.   Left Ear: Hearing, external ear and ear canal normal. Tympanic membrane is erythematous and bulging.   Nose: Congestion present. No mucosal edema, rhinorrhea or " nasal deformity. No epistaxis. Right sinus exhibits no maxillary sinus tenderness and no frontal sinus tenderness. Left sinus exhibits no maxillary sinus tenderness and no frontal sinus tenderness.   Mouth/Throat: Uvula is midline and mucous membranes are normal. Mucous membranes are moist. No trismus in the jaw. Normal dentition. No uvula swelling. Posterior oropharyngeal erythema present. No oropharyngeal exudate or posterior oropharyngeal edema. Oropharynx is clear.   Eyes: Conjunctivae and lids are normal. No scleral icterus.   Neck: Trachea normal and phonation normal. Neck supple. No edema present. No erythema present. No neck rigidity present.   Cardiovascular: Normal rate and regular rhythm.   Pulmonary/Chest: Effort normal and breath sounds normal. No respiratory distress. She has no decreased breath sounds. She has no wheezes. She has no rhonchi.   Abdominal: Normal appearance.   Musculoskeletal: Normal range of motion.         General: No deformity. Normal range of motion.   Neurological: She is alert and oriented to person, place, and time. She exhibits normal muscle tone. Coordination normal.   Skin: Skin is warm, dry, intact, not diaphoretic and not pale.   Psychiatric: Her speech is normal and behavior is normal. Judgment and thought content normal.   Nursing note and vitals reviewed.    Results for orders placed or performed in visit on 04/06/24   SARS Coronavirus 2 Antigen, POCT Manual Read   Result Value Ref Range    SARS Coronavirus 2 Antigen Negative Negative     Acceptable Yes       Assessment:     1. Bilateral acute otitis media    2. URI with cough and congestion        Plan:       Bilateral acute otitis media  -     cefdinir (OMNICEF) 300 MG capsule; Take 1 capsule (300 mg total) by mouth 2 (two) times daily. for 10 days  Dispense: 20 capsule; Refill: 0  -     ibuprofen (ADVIL,MOTRIN) 800 MG tablet; Take 1 tablet (800 mg total) by mouth every 6 (six) hours as needed (pain or  fever).  Dispense: 30 tablet; Refill: 0    URI with cough and congestion  -     SARS Coronavirus 2 Antigen, POCT Manual Read  -     ibuprofen (ADVIL,MOTRIN) 800 MG tablet; Take 1 tablet (800 mg total) by mouth every 6 (six) hours as needed (pain or fever).  Dispense: 30 tablet; Refill: 0  -     benzonatate (TESSALON) 200 MG capsule; Take 1 capsule (200 mg total) by mouth every 8 (eight) hours as needed for Cough.  Dispense: 30 capsule; Refill: 0      Patient Instructions   Oral fluids  Rest  Blow nose often   Steam form hot showers to help open upper airway

## 2024-09-23 ENCOUNTER — TELEPHONE (OUTPATIENT)
Dept: FAMILY MEDICINE | Facility: CLINIC | Age: 34
End: 2024-09-23

## 2024-09-23 ENCOUNTER — OFFICE VISIT (OUTPATIENT)
Dept: FAMILY MEDICINE | Facility: CLINIC | Age: 34
End: 2024-09-23
Payer: COMMERCIAL

## 2024-09-23 ENCOUNTER — HOSPITAL ENCOUNTER (OUTPATIENT)
Dept: RADIOLOGY | Facility: HOSPITAL | Age: 34
Discharge: HOME | End: 2024-09-23
Payer: COMMERCIAL

## 2024-09-23 VITALS
BODY MASS INDEX: 32.88 KG/M2 | OXYGEN SATURATION: 99 % | TEMPERATURE: 99 F | WEIGHT: 185.63 LBS | HEART RATE: 87 BPM | DIASTOLIC BLOOD PRESSURE: 78 MMHG | SYSTOLIC BLOOD PRESSURE: 120 MMHG

## 2024-09-23 DIAGNOSIS — M54.2 CERVICALGIA: Primary | ICD-10-CM

## 2024-09-23 DIAGNOSIS — M54.2 CERVICALGIA: ICD-10-CM

## 2024-09-23 DIAGNOSIS — M54.16 LUMBAR RADICULAR PAIN: ICD-10-CM

## 2024-09-23 DIAGNOSIS — K59.01 SLOW TRANSIT CONSTIPATION: ICD-10-CM

## 2024-09-23 PROCEDURE — 1160F RVW MEDS BY RX/DR IN RCRD: CPT | Mod: CPTII,S$GLB,,

## 2024-09-23 PROCEDURE — 72100 X-RAY EXAM L-S SPINE 2/3 VWS: CPT | Mod: 26,,, | Performed by: RADIOLOGY

## 2024-09-23 PROCEDURE — 99214 OFFICE O/P EST MOD 30 MIN: CPT | Mod: S$GLB,,,

## 2024-09-23 PROCEDURE — 72040 X-RAY EXAM NECK SPINE 2-3 VW: CPT | Mod: TC,PN

## 2024-09-23 PROCEDURE — 3074F SYST BP LT 130 MM HG: CPT | Mod: CPTII,S$GLB,,

## 2024-09-23 PROCEDURE — 72040 X-RAY EXAM NECK SPINE 2-3 VW: CPT | Mod: 26,,, | Performed by: RADIOLOGY

## 2024-09-23 PROCEDURE — 3008F BODY MASS INDEX DOCD: CPT | Mod: CPTII,S$GLB,,

## 2024-09-23 PROCEDURE — 3078F DIAST BP <80 MM HG: CPT | Mod: CPTII,S$GLB,,

## 2024-09-23 PROCEDURE — 1159F MED LIST DOCD IN RCRD: CPT | Mod: CPTII,S$GLB,,

## 2024-09-23 PROCEDURE — 72100 X-RAY EXAM L-S SPINE 2/3 VWS: CPT | Mod: TC,PN

## 2024-09-23 PROCEDURE — 99999 PR PBB SHADOW E&M-EST. PATIENT-LVL IV: CPT | Mod: PBBFAC,,,

## 2024-09-23 RX ORDER — METHOCARBAMOL 500 MG/1
500 TABLET, FILM COATED ORAL 4 TIMES DAILY PRN
Qty: 40 TABLET | Refills: 1 | Status: SHIPPED | OUTPATIENT
Start: 2024-09-23

## 2024-09-23 RX ORDER — MELOXICAM 7.5 MG/1
7.5 TABLET ORAL DAILY PRN
Qty: 30 TABLET | Refills: 0 | Status: SHIPPED | OUTPATIENT
Start: 2024-09-23

## 2024-09-23 RX ORDER — POLYETHYLENE GLYCOL 3350 17 G/17G
17 POWDER, FOR SOLUTION ORAL DAILY PRN
Qty: 30 EACH | Refills: 1 | Status: SHIPPED | OUTPATIENT
Start: 2024-09-23

## 2024-09-23 RX ORDER — IBUPROFEN 200 MG
220 TABLET ORAL EVERY 6 HOURS PRN
COMMUNITY
End: 2024-09-23

## 2024-09-23 NOTE — PROGRESS NOTES
SUBJECTIVE:      Patient ID: Alea Barrett is a 34 y.o. female.    Chief Complaint: Back Pain (From neck to lower back. Pt says this has been an ongoing problem for 8 years and has been seeing a chiropractor for 4 years with no change.)    Alea is a 34 year female, who is here to establish care and noted with complaints of neck and back pain. Chronic medication conditions: Anxiety, depression, and back pain.       Described cervical pain is a nagging pain as if needs to be adjusted, but is unrelieved by adjusting. Lumbar pain is a tightness and ache. Reports that hip pain is dull aching pain that will not relieve. Reports that any activity or prolong sitting makes it worse. Reports radiating pain left buttocks. Denies incont of bowel and bladder. Reports that she does have constipation. Denies numbness or tingling down extremities. Reports taking Advil- takes the edge off. Continues massages, Voltaren gel, bio freeze, Ice and heat- make it where she can function better. More stiffness in the morning and muscle spasms when she gets home from work.     Constipation- has been dealing with on and off for several years. Denies nausea or emesis. Does not taking anything currently for constipation.     Back Pain  This is a chronic problem. The current episode started more than 1 year ago. The problem has been waxing and waning since onset. The pain is present in the lumbar spine and sacro-iliac. The quality of the pain is described as aching (tightness). Radiates to: right buttocks. The pain is moderate. The symptoms are aggravated by bending, position and sitting. Stiffness is present In the morning. Pertinent negatives include no abdominal pain, bladder incontinence, bowel incontinence, chest pain, dysuria, fever, headaches, leg pain, numbness, pelvic pain, perianal numbness, tingling, weakness or weight loss. Risk factors include poor posture and sedentary lifestyle. She has tried chiropractic  manipulation, analgesics, ice, NSAIDs and heat for the symptoms. The treatment provided mild relief.       Review of patient's allergies indicates:  No Active Allergies   Past Medical History:   Diagnosis Date    Anxiety     Depression      Past Surgical History:   Procedure Laterality Date    ear tube      bilateral    EYE SURGERY      WRIST SURGERY  04/2019    Carpal Tunnel     Current Outpatient Medications   Medication Sig Dispense Refill    levonorgestreL (MIRENA) 21 mcg/24 hours (8 yrs) 52 mg IUD 1 each by Intrauterine route once.      meloxicam (MOBIC) 7.5 MG tablet Take 1 tablet (7.5 mg total) by mouth daily as needed for Pain. 30 tablet 0    methocarbamoL (ROBAXIN) 500 MG Tab Take 1 tablet (500 mg total) by mouth 4 (four) times daily as needed. 40 tablet 1    polyethylene glycol (MIRALAX) 17 gram PwPk Take 17 g by mouth daily as needed for Constipation. 30 each 1     No current facility-administered medications for this visit.     Family History   Problem Relation Name Age of Onset    Hypertension Mother      Arthritis Mother      Hypertension Father      Arthritis Father      Hyperlipidemia Father      Hypertension Maternal Grandmother      Arthritis Maternal Grandmother      Pacemaker/defibrilator Maternal Grandmother      Hypertension Paternal Grandmother      Diabetes Paternal Grandmother      Arthritis Paternal Grandmother      Hyperlipidemia Paternal Grandmother      Hypertension Maternal Grandfather      Heart disease Maternal Grandfather      Cancer Paternal Grandfather        Social History     Socioeconomic History    Marital status: Single    Number of children: 0   Tobacco Use    Smoking status: Never    Smokeless tobacco: Never   Substance and Sexual Activity    Alcohol use: No    Drug use: No    Sexual activity: Yes     Partners: Male     Social Determinants of Health     Financial Resource Strain: Low Risk  (9/21/2024)    Overall Financial Resource Strain (CARDIA)     Difficulty of Paying  Living Expenses: Not hard at all   Food Insecurity: No Food Insecurity (9/21/2024)    Hunger Vital Sign     Worried About Running Out of Food in the Last Year: Never true     Ran Out of Food in the Last Year: Never true   Transportation Needs: No Transportation Needs (5/22/2023)    PRAPARE - Transportation     Lack of Transportation (Medical): No     Lack of Transportation (Non-Medical): No   Physical Activity: Insufficiently Active (9/21/2024)    Exercise Vital Sign     Days of Exercise per Week: 1 day     Minutes of Exercise per Session: 30 min   Stress: Stress Concern Present (9/21/2024)    [employer] [place] [employer] - Occupational Stress Questionnaire     Feeling of Stress : Very much   Housing Stability: Low Risk  (5/22/2023)    Housing Stability Vital Sign     Unable to Pay for Housing in the Last Year: No     Number of Places Lived in the Last Year: 1     Unstable Housing in the Last Year: No       Review of Systems   Constitutional:  Negative for chills, fatigue, fever, unexpected weight change and weight loss.   HENT:  Negative for nasal congestion, ear pain, hearing loss, rhinorrhea, sore throat and voice change.    Eyes:  Negative for photophobia and visual disturbance.   Respiratory:  Negative for chest tightness, shortness of breath and wheezing.    Cardiovascular:  Negative for chest pain, palpitations and leg swelling.   Gastrointestinal:  Positive for constipation. Negative for abdominal pain, blood in stool, bowel incontinence, diarrhea, nausea and vomiting.   Endocrine: Negative for polydipsia, polyphagia and polyuria.   Genitourinary:  Negative for bladder incontinence, difficulty urinating, dysuria, frequency, hematuria and pelvic pain.   Musculoskeletal:  Positive for back pain and neck pain. Negative for arthralgias, leg pain and myalgias.   Integumentary:  Negative for rash and wound.   Neurological:  Negative for dizziness, tingling, syncope, weakness, light-headedness, numbness  and headaches.   Psychiatric/Behavioral:  Negative for dysphoric mood, self-injury, sleep disturbance and suicidal ideas.       OBJECTIVE:      Vitals:    09/23/24 1047   BP: 120/78   BP Location: Left arm   Patient Position: Sitting   BP Method: Medium (Manual)   Pulse: 87   Temp: 98.5 °F (36.9 °C)   TempSrc: Oral   SpO2: 99%   Weight: 84.2 kg (185 lb 9.6 oz)     Physical Exam  Vitals and nursing note reviewed.   Constitutional:       General: She is not in acute distress.     Appearance: Normal appearance. She is well-developed. She is obese. She is not ill-appearing.      Comments: BMI 32     HENT:      Head: Normocephalic and atraumatic.      Nose: Nose normal.      Mouth/Throat:      Pharynx: Uvula midline.   Eyes:      General: Lids are normal.      Conjunctiva/sclera: Conjunctivae normal.      Pupils: Pupils are equal, round, and reactive to light.      Right eye: Pupil is round and reactive.      Left eye: Pupil is round and reactive.   Neck:      Thyroid: No thyromegaly.      Vascular: No JVD.      Trachea: Trachea normal.   Cardiovascular:      Rate and Rhythm: Normal rate and regular rhythm.      Pulses: Normal pulses.      Heart sounds: Normal heart sounds.   Pulmonary:      Effort: Pulmonary effort is normal. No tachypnea or respiratory distress.      Breath sounds: Normal breath sounds.   Abdominal:      General: Bowel sounds are normal. There is no distension.      Palpations: Abdomen is soft. There is no mass.      Tenderness: There is no abdominal tenderness. There is no right CVA tenderness or left CVA tenderness.      Hernia: No hernia is present.   Musculoskeletal:         General: Normal range of motion.      Cervical back: Normal range of motion and neck supple.   Lymphadenopathy:      Cervical: No cervical adenopathy.   Skin:     General: Skin is warm and dry.      Findings: No rash.   Neurological:      Mental Status: She is alert and oriented to person, place, and time.   Psychiatric:          Mood and Affect: Mood normal.         Speech: Speech normal.         Behavior: Behavior normal. Behavior is cooperative.         Thought Content: Thought content normal.        Assessment:       1. Cervicalgia    2. Lumbar radicular pain    3. Slow transit constipation        Plan:       Cervicalgia  -     X-Ray Cervical Spine AP And Lateral; Future; Expected date: 09/23/2024  -     Ambulatory referral/consult to Physical/Occupational Therapy; Future; Expected date: 09/30/2024  -     methocarbamoL (ROBAXIN) 500 MG Tab; Take 1 tablet (500 mg total) by mouth 4 (four) times daily as needed.  Dispense: 40 tablet; Refill: 1  -     meloxicam (MOBIC) 7.5 MG tablet; Take 1 tablet (7.5 mg total) by mouth daily as needed for Pain.  Dispense: 30 tablet; Refill: 0    Lumbar radicular pain  -     X-Ray Lumbar Spine AP And Lateral; Future; Expected date: 09/23/2024  -     Ambulatory referral/consult to Physical/Occupational Therapy; Future; Expected date: 09/30/2024  -     methocarbamoL (ROBAXIN) 500 MG Tab; Take 1 tablet (500 mg total) by mouth 4 (four) times daily as needed.  Dispense: 40 tablet; Refill: 1  -     meloxicam (MOBIC) 7.5 MG tablet; Take 1 tablet (7.5 mg total) by mouth daily as needed for Pain.  Dispense: 30 tablet; Refill: 0    Slow transit constipation  -     polyethylene glycol (MIRALAX) 17 gram PwPk; Take 17 g by mouth daily as needed for Constipation.  Dispense: 30 each; Refill: 1    I spent a total of 30 minutes on the day of the visit.  This includes face to face time and non-face to face time preparing to see the patient (eg, review of tests), obtaining and/or reviewing separately obtained history, documenting clinical information in the electronic or other health record, independently interpreting results and communicating results to the patient/family/caregiver, or care coordinator.     Follow up if symptoms worsen or fail to improve.      9/23/2024 RONALDO Yee, FNP    This note was created  using M[employer] voice recognition software that occasionally misinterprets phrases or words.

## 2024-09-23 NOTE — TELEPHONE ENCOUNTER
----- Message from Estefania Benson NP sent at 9/23/2024  2:42 PM CDT -----  Please let patient know that I have seen her x-rays.  Shows a little bit of changes in her L4-L5 and L5-S1-nothing that is detrimental or requiring patient to go straight to orthopedics.  Please let her know that I recommend trying the treatment that we have discuss in office and then if no improvement we will send to Orthopedics.

## 2024-09-30 ENCOUNTER — OFFICE VISIT (OUTPATIENT)
Dept: FAMILY MEDICINE | Facility: CLINIC | Age: 34
End: 2024-09-30
Payer: COMMERCIAL

## 2024-09-30 ENCOUNTER — LAB VISIT (OUTPATIENT)
Dept: LAB | Facility: HOSPITAL | Age: 34
End: 2024-09-30
Payer: COMMERCIAL

## 2024-09-30 VITALS
WEIGHT: 147.19 LBS | TEMPERATURE: 99 F | HEART RATE: 91 BPM | BODY MASS INDEX: 25.13 KG/M2 | DIASTOLIC BLOOD PRESSURE: 70 MMHG | SYSTOLIC BLOOD PRESSURE: 120 MMHG | HEIGHT: 64 IN | OXYGEN SATURATION: 99 %

## 2024-09-30 DIAGNOSIS — Z11.3 SCREENING EXAMINATION FOR STD (SEXUALLY TRANSMITTED DISEASE): ICD-10-CM

## 2024-09-30 DIAGNOSIS — E55.9 VITAMIN D DEFICIENCY: ICD-10-CM

## 2024-09-30 DIAGNOSIS — Z00.00 ANNUAL PHYSICAL EXAM: ICD-10-CM

## 2024-09-30 DIAGNOSIS — Z00.00 ANNUAL PHYSICAL EXAM: Primary | ICD-10-CM

## 2024-09-30 PROBLEM — R63.5 WEIGHT GAIN: Status: RESOLVED | Noted: 2023-05-22 | Resolved: 2024-09-30

## 2024-09-30 PROBLEM — R63.4 WEIGHT LOSS, UNINTENTIONAL: Status: RESOLVED | Noted: 2020-02-26 | Resolved: 2024-09-30

## 2024-09-30 LAB
25(OH)D3+25(OH)D2 SERPL-MCNC: 17 NG/ML (ref 30–96)
ALBUMIN SERPL BCP-MCNC: 4.2 G/DL (ref 3.5–5.2)
ALP SERPL-CCNC: 83 U/L (ref 55–135)
ALT SERPL W/O P-5'-P-CCNC: 16 U/L (ref 10–44)
ANION GAP SERPL CALC-SCNC: 9 MMOL/L (ref 8–16)
AST SERPL-CCNC: 18 U/L (ref 10–40)
BASOPHILS # BLD AUTO: 0.05 K/UL (ref 0–0.2)
BASOPHILS NFR BLD: 0.6 % (ref 0–1.9)
BILIRUB SERPL-MCNC: 0.6 MG/DL (ref 0.1–1)
BUN SERPL-MCNC: 9 MG/DL (ref 6–20)
CALCIUM SERPL-MCNC: 9.7 MG/DL (ref 8.7–10.5)
CHLORIDE SERPL-SCNC: 108 MMOL/L (ref 95–110)
CHOLEST SERPL-MCNC: 191 MG/DL (ref 120–199)
CHOLEST/HDLC SERPL: 2.6 {RATIO} (ref 2–5)
CO2 SERPL-SCNC: 21 MMOL/L (ref 23–29)
CREAT SERPL-MCNC: 0.8 MG/DL (ref 0.5–1.4)
DIFFERENTIAL METHOD BLD: ABNORMAL
EOSINOPHIL # BLD AUTO: 0.2 K/UL (ref 0–0.5)
EOSINOPHIL NFR BLD: 2.3 % (ref 0–8)
ERYTHROCYTE [DISTWIDTH] IN BLOOD BY AUTOMATED COUNT: 12.8 % (ref 11.5–14.5)
EST. GFR  (NO RACE VARIABLE): >60 ML/MIN/1.73 M^2
GLUCOSE SERPL-MCNC: 84 MG/DL (ref 70–110)
HCT VFR BLD AUTO: 42.4 % (ref 37–48.5)
HDLC SERPL-MCNC: 74 MG/DL (ref 40–75)
HDLC SERPL: 38.7 % (ref 20–50)
HGB BLD-MCNC: 13.8 G/DL (ref 12–16)
HIV 1+2 AB+HIV1 P24 AG SERPL QL IA: NORMAL
IMM GRANULOCYTES # BLD AUTO: 0.05 K/UL (ref 0–0.04)
IMM GRANULOCYTES NFR BLD AUTO: 0.6 % (ref 0–0.5)
LDLC SERPL CALC-MCNC: 104.6 MG/DL (ref 63–159)
LYMPHOCYTES # BLD AUTO: 2.2 K/UL (ref 1–4.8)
LYMPHOCYTES NFR BLD: 25.1 % (ref 18–48)
MCH RBC QN AUTO: 29.2 PG (ref 27–31)
MCHC RBC AUTO-ENTMCNC: 32.5 G/DL (ref 32–36)
MCV RBC AUTO: 90 FL (ref 82–98)
MONOCYTES # BLD AUTO: 0.4 K/UL (ref 0.3–1)
MONOCYTES NFR BLD: 5.1 % (ref 4–15)
NEUTROPHILS # BLD AUTO: 5.7 K/UL (ref 1.8–7.7)
NEUTROPHILS NFR BLD: 66.3 % (ref 38–73)
NONHDLC SERPL-MCNC: 117 MG/DL
NRBC BLD-RTO: 0 /100 WBC
PLATELET # BLD AUTO: 275 K/UL (ref 150–450)
PMV BLD AUTO: 10.3 FL (ref 9.2–12.9)
POTASSIUM SERPL-SCNC: 4.2 MMOL/L (ref 3.5–5.1)
PROT SERPL-MCNC: 7.5 G/DL (ref 6–8.4)
RBC # BLD AUTO: 4.73 M/UL (ref 4–5.4)
SODIUM SERPL-SCNC: 138 MMOL/L (ref 136–145)
TREPONEMA PALLIDUM IGG+IGM AB [PRESENCE] IN SERUM OR PLASMA BY IMMUNOASSAY: NONREACTIVE
TRIGL SERPL-MCNC: 62 MG/DL (ref 30–150)
TSH SERPL DL<=0.005 MIU/L-ACNC: 0.84 UIU/ML (ref 0.4–4)
WBC # BLD AUTO: 8.59 K/UL (ref 3.9–12.7)

## 2024-09-30 PROCEDURE — 82306 VITAMIN D 25 HYDROXY: CPT

## 2024-09-30 PROCEDURE — 84443 ASSAY THYROID STIM HORMONE: CPT

## 2024-09-30 PROCEDURE — 87389 HIV-1 AG W/HIV-1&-2 AB AG IA: CPT

## 2024-09-30 PROCEDURE — 36415 COLL VENOUS BLD VENIPUNCTURE: CPT

## 2024-09-30 PROCEDURE — 1159F MED LIST DOCD IN RCRD: CPT | Mod: CPTII,S$GLB,,

## 2024-09-30 PROCEDURE — 3074F SYST BP LT 130 MM HG: CPT | Mod: CPTII,S$GLB,,

## 2024-09-30 PROCEDURE — 99395 PREV VISIT EST AGE 18-39: CPT | Mod: S$GLB,,,

## 2024-09-30 PROCEDURE — 86593 SYPHILIS TEST NON-TREP QUANT: CPT

## 2024-09-30 PROCEDURE — 3078F DIAST BP <80 MM HG: CPT | Mod: CPTII,S$GLB,,

## 2024-09-30 PROCEDURE — 80061 LIPID PANEL: CPT

## 2024-09-30 PROCEDURE — 3008F BODY MASS INDEX DOCD: CPT | Mod: CPTII,S$GLB,,

## 2024-09-30 PROCEDURE — 99999 PR PBB SHADOW E&M-EST. PATIENT-LVL IV: CPT | Mod: PBBFAC,,,

## 2024-09-30 PROCEDURE — 1160F RVW MEDS BY RX/DR IN RCRD: CPT | Mod: CPTII,S$GLB,,

## 2024-09-30 PROCEDURE — 80053 COMPREHEN METABOLIC PANEL: CPT

## 2024-09-30 PROCEDURE — 85025 COMPLETE CBC W/AUTO DIFF WBC: CPT
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
## 2024-10-01 ENCOUNTER — PATIENT MESSAGE (OUTPATIENT)
Dept: FAMILY MEDICINE | Facility: CLINIC | Age: 34
End: 2024-10-01
Payer: COMMERCIAL

## 2024-10-01 DIAGNOSIS — E55.9 VITAMIN D DEFICIENCY: Primary | ICD-10-CM

## 2024-10-01 RX ORDER — ERGOCALCIFEROL 1.25 MG/1
50000 CAPSULE ORAL
Qty: 4 CAPSULE | Refills: 11 | Status: SHIPPED | OUTPATIENT
Start: 2024-10-01

## 2024-10-02 ENCOUNTER — CLINICAL SUPPORT (OUTPATIENT)
Dept: REHABILITATION | Facility: HOSPITAL | Age: 34
End: 2024-10-02
Payer: COMMERCIAL

## 2024-10-02 DIAGNOSIS — M54.16 LUMBAR RADICULAR PAIN: ICD-10-CM

## 2024-10-02 DIAGNOSIS — R29.3 POSTURE IMBALANCE: Primary | ICD-10-CM

## 2024-10-02 DIAGNOSIS — R68.89 IMPAIRED TOLERANCE OF ACTIVITY: ICD-10-CM

## 2024-10-02 DIAGNOSIS — M54.2 CERVICALGIA: ICD-10-CM

## 2024-10-02 DIAGNOSIS — R19.8 ABDOMINAL WEAKNESS: ICD-10-CM

## 2024-10-02 PROCEDURE — 97110 THERAPEUTIC EXERCISES: CPT | Mod: PN

## 2024-10-02 PROCEDURE — 97161 PT EVAL LOW COMPLEX 20 MIN: CPT | Mod: PN

## 2024-10-02 NOTE — PLAN OF CARE
[facility]  Physical Therapy Initial Evaluation    Name: Alea Barrett  Clinic Number: [id number]    Therapy Diagnosis:   Encounter Diagnoses   Name Primary?    Cervicalgia     Lumbar radicular pain     Posture imbalance Yes    Abdominal weakness     Impaired tolerance of activity      Physician: Estefania Benson NP    Physician Orders: PT Eval and Treat   Medical Diagnosis from Referral:   M54.2 (ICD-10-CM) - Cervicalgia   M54.16 (ICD-10-CM) - Lumbar radicular pain   Evaluation Date: 10/2/2024  Authorization Period Expiration: 12/31/2024  Plan of Care Expiration: 12/18/2024 or 12v post eval  Visit # (episodes)/ Visits authorized: 1/ eval (POC 0 / 12 )  2nd FOTO visit 5  3rd FOTO visit 10  Progress Note Due: 11/2/2024  PTA: 0/5    Time In: 400  Time Out: 450  Total Billable Time: 50 minutes    Precautions: Anxiety; Depression  Insurance: Payor: [employer] / Plan: [place] HMO / Product Type: HMO /     Subjective   Date of onset: 12 years ago  History of current condition - Alea reports: back pain started about 12 years ago; accumulation of slips, falls, and a lumbar sprain.  Neck pain started 6 years ago; unsure of origin. She has been going to a chiropractor for 4 years; primarily manipulations. She has tried ice, heat, pn medicine, topicals. Decided to seek more professional help. Has only seen her PCP at this point.      Medical History:   Past Medical History:   Diagnosis Date    Anxiety     Depression     Weight gain 05/22/2023       Surgical History:   Alea Barrett  has a past surgical history that includes ear tube; Wrist surgery (04/2019); and Eye surgery.    Medications:   Alea has a current medication list which includes the following prescription(s): ergocalciferol, levonorgestrel, meloxicam, methocarbamol, and polyethylene glycol.    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  Prior Level of Function: independent ambulation  Current Level of Function: difficult/pain with work, prolong standing, lifting    Pain:  Current 4/10, worst 6/10, best 2/10 (mostly 4-5/10)  Location: Occ ridge into skull and Upper Trapezius and midline; Lumbar into sacrum, radiates into glutes and thighs (>Left)  Description: Dull achey with sharp pn with Weight Bearing into hip; denies n/t in arms and legs  Aggravating Factors: work, prolong standing, lifting  Easing Factors: heat, ice, topicals; treadmill up to 30 min    Pts goals: to get of as much pn as possible    Objective     Posture / IC / ASIS / PSIS: Bilateral knee genu valgum; Right IC/ASIS higher (more even posterior); Anterior Pelvic Tilt; moderate kyphsosis and FHP  Palpation: TTP Occipital ridge into Upper Trapezius; lumbosacral regions    Range of Motion/Strength:        CERVICAL AROM  In degrees Pain/Dysfunction with Movement   Flexion (45 norm) 50    Extension (75 norm) 50    Right side bending  (35 norm) 45    Left side bending  (35 norm) 45    Right rotation  (65 norm) 55    Left rotation  (65 norm) 40      Seated Thoracic Spine Screen & Observation:  Flex: excessive moderate  Ext: limited moderate    Shoulder AROM Screen:  WNL except shoulder extension, moderate restriction Bilateral     Lumbar AROM: ROM-   Response to repeated movements   Flexion 12 cm from floor - stretch   Extension (15 norm) 20 degrees    Right side bending  (20 norm) 25 degrees   Left side bending  (20 norm) 25 degrees   Rotation  Observation WNL, felt start of spasm     UE MMT:  5/5 norm Left Right   Shoulder Flexion 5/5 5/5   Shoulder Extension 4/5 4/5   Shoulder Abduction 5/5 5/5   Serratus Anterior  4+/5 4+/5   Shoulder IR 4+/5 4+/5   Shoulder ER  @ 0* Abduction 4+/5 4+/5   Elbow Flexion  4+/5 4+/5   Elbow Extension 4+/5 4+/5   Mid Traps 3+/5 3+/5   Low Traps 3/5 3/5     LE MMT:  5/5 norm Left Right   Hip Flexion 4/5 4/5   Hip Extension 5/5 5/5   Hip Abduction 4/5 4/5   Hip Adduction 5/5 5/5   Hip IR 5/5 5/5   Hip ER 4/5 4/5   Knee Flexion 5/5 5/5   Knee Extension 4/5 4/5   Ankle DF 5/5 5/5   Ankle PF 5/5 5/5   Ankle Inversion 5/5 5/5   Ankle Eversion 5/5 5/5        Strength (in pounds, setting II one maximum trial):    Left Right    II 65 lbs 50 lbs    Normal  Average Values  Female Right Left Male: Right Left   20-29 66 lbs 62 lbs         94 lbs 86 lbs   30-39 68 lbs 64 lbs 90 lbs 82 lbs   40-49 64 lbs 62 lbs 80 lbs 74 lbs   50-59 62 lbs 57 lbs 72 lbs 65 lbs   60-69 53 lbs 51 lbs 63 lbs 56 lbs   70+ 44 lbs 42 lbs 54 lbs 48 lbs        Flexibility Left Right   Hamstrings @ 90/90 (-20 norm) -30 degrees -30 degrees     Cervical musculature flexibility: (restriction: mild, moderate, severe grading)   Muscle Flexibility   Upper trapezius severe   pectoralis severe     Innominates: LLD (leg length discrepancy) / Supine to Sit: Right longer in supine    See FOTO score in Media section of EPIC      TREATMENT   Treatment Time In: 430  Treatment Time Out: 450  Total Treatment time separate from Evaluation: 20 minutes    Alea received therapeutic exercises to develop strength, endurance, ROM, and flexibility for 15 minutes including:  NMRE utilized to activate appropriate mm to improve posture, core and lumbar stabilization and lumbopelvic stability: 5 minutes     TABLE:  Lumbar rotation  T/s extension stretch  Ab brace    SEATED:  Bilateral Shoulder External Rotation   Hamstring stretch        Add NEXT: pt prone to spasms and high  sensitivity/fear avoidance  Strengthen:Core / Biceps / Triceps / RTC / Mid trap  Shoulder extension with wand  Doorway stretch  Seated Physioball   Chin tucks  Focus posture  Test quad flexibility  Innominates  FUTURE:  Morning mobility routine  McK lumbar extension  Open books - spasm in eval      Home Exercises and Patient Education Provided    Education provided:   - daily HEP  -utilize heat in mornings; ice with flare-up; both 10-20 minute max      Written Home Exercises Provided: yes.  Exercises were reviewed and Alea was able to demonstrate them prior to the end of the session.  Alea demonstrated good  understanding of the education provided.     See EMR under Patient Instructions for exercises provided 10/2/2024.    Assessment   Alea is a 34 y.o. female referred to outpatient Physical Therapy with a medical diagnosis of cervicalgia and lumbar pain. Pt presents with pain palpable throughout occipital ridge into Upper Trapezius as well as lumbosacral regions, decreased Upper Trapezius, pectoralis, and Hamstring flexibility, core, hip and UE and LE weakness, posture deficits and impaired function.    Pt prognosis is Good.   Pt will benefit from skilled outpatient Physical Therapy to address the deficits stated above and in the chart below, provide pt/family education, and to maximize pt's level of independence.     Plan of care discussed with patient: Yes  Pt's spiritual, cultural and educational needs considered and patient is agreeable to the plan of care and goals as stated below:     Anticipated Barriers for therapy: chronicity of condition      Medical Necessity is demonstrated by the following  History  Co-morbidities and personal factors that may impact the plan of care [] LOW: no personal factors / co-morbidities  [x] MODERATE: 1-2 personal factors / co-morbidities  [] HIGH: 3+ personal factors / co-morbidities    Moderate / High Support Documentation:   Co-morbidities affecting plan of care: see  precautions    Personal Factors:   age  coping style  lifestyle     Examination  Body Structures and Functions, activity limitations and participation restrictions that may impact the plan of care [] LOW: addressing 1-2 elements  [x] MODERATE: 3+ elements  [] HIGH: 4+ elements (please support below)    Moderate / High Support Documentation: Range of Motion, strength, flexibility, posture     Clinical Presentation [x] LOW: stable  [] MODERATE: Evolving  [] HIGH: Unstable     Decision Making/ Complexity Score: low          Goals:  Short Term GOALS: 3 weeks. Pt agrees with goals set.  1. Patient demonstrates compliance with HEP.   2. Patient demonstrates independence with Postural Awareness while sitting and standing.   3. Patient demonstrates decreased pain level of 3/10 while performing daily activities.  4. Patient will improve Hamstring and pectoralis flexibility to mild restriction.     Long Term GOALS: 6 weeks. Pt agrees with goals set.  1. Patient demonstrates increased C/S AROM to improve tolerance to daily activities and turning head while driving.   2. Patient demonstrates increased core, hip and BLE strength by 1/2 grade or greater to improve tolerance to home chores.  3. Patient demonstrates increased periscapular muscle and Bilateral Upper Extremities strength by 1/2 grade or more to improve tolerance to work.  4. Patient demonstrates independence with body mechanics while working.  5. Patient demonstrates independence with HEP.   6. Patient will improve FOTO function score by 25% to show improvement in condition and functional mobility.        Plan   Plan of care Certification: 10/2/2024 to 12/18/2024.    Outpatient Physical Therapy 2 times weekly for 6 weeks to include the following interventions: Cervical/Lumbar Traction, Electrical Stimulation ,, Gait Training, Manual Therapy, Moist Heat/ Ice, Neuromuscular Re-ed, Orthotic Management and Training, Patient Education, Self Care, Therapeutic Activities,  Therapeutic Exercise, and Ultrasound.     Smiley Lima, PT

## 2024-10-03 PROBLEM — R29.3 POSTURE IMBALANCE: Status: ACTIVE | Noted: 2024-10-03

## 2024-10-03 PROBLEM — R68.89 IMPAIRED TOLERANCE OF ACTIVITY: Status: ACTIVE | Noted: 2024-10-03

## 2024-10-03 PROBLEM — R19.8 ABDOMINAL WEAKNESS: Status: ACTIVE | Noted: 2024-10-03

## 2024-10-04 ENCOUNTER — TELEPHONE (OUTPATIENT)
Dept: FAMILY MEDICINE | Facility: CLINIC | Age: 34
End: 2024-10-04
Payer: COMMERCIAL

## 2024-10-04 NOTE — TELEPHONE ENCOUNTER
" Medical Urgency response  Received: 3 days ago  Derrick Lew MA P Rachel [facility] Staff  Cc: [facility] Referral Follow Up  Good morning/Afternoon      Please respond YES or NO via In-basket or contact [facility] @ [phone]      Is this procedure medically urgent or non-medically urgent?          Medical Urgency Definition    If you can Answer yes to one of the following questions, the  Case will be considered "Medically Urgent" and will be done as  Scheduled irrespective of whether [facility] will receive payment.    *If this particular case is not done as scheduled, would the patient  Experience loss of life?    *Loss of Limb?    *Irreversible loss of function?    *Would their condition significantly worsen?  "

## 2024-10-07 ENCOUNTER — CLINICAL SUPPORT (OUTPATIENT)
Dept: REHABILITATION | Facility: HOSPITAL | Age: 34
End: 2024-10-07
Payer: COMMERCIAL

## 2024-10-07 DIAGNOSIS — R68.89 IMPAIRED TOLERANCE OF ACTIVITY: ICD-10-CM

## 2024-10-07 DIAGNOSIS — R29.3 POSTURE IMBALANCE: Primary | ICD-10-CM

## 2024-10-07 DIAGNOSIS — R19.8 ABDOMINAL WEAKNESS: ICD-10-CM

## 2024-10-07 PROCEDURE — 97110 THERAPEUTIC EXERCISES: CPT | Mod: PN

## 2024-10-07 PROCEDURE — 97112 NEUROMUSCULAR REEDUCATION: CPT | Mod: PN

## 2024-10-07 NOTE — PROGRESS NOTES
[facility]   Physical Therapy Treatment Note      Name: Alea Barrett  Clinic Number: [id number]    Therapy Diagnosis:   Encounter Diagnoses   Name Primary?    Posture imbalance Yes    Abdominal weakness     Impaired tolerance of activity      Physician: Estefania Benson NP    Visit Date: 10/7/2024    Physician Orders: PT Eval and Treat   Medical Diagnosis from Referral:   M54.2 (ICD-10-CM) - Cervicalgia   M54.16 (ICD-10-CM) - Lumbar radicular pain   Evaluation Date: 10/2/2024  Authorization Period Expiration: 12/31/2024  Plan of Care Expiration: 12/18/2024 or 12v post eval  Visit # (episodes)/ Visits authorized: 2 / eval +20 (POC 1 / 12 )  2nd FOTO visit 5  3rd FOTO visit 10  Progress Note Due: 11/2/2024  PTA: 0/5     Time In: 1100  Time Out: 1153  Total Billable Time: 53 minutes     Precautions: Anxiety; Depression  Insurance: Payor: [employer] / Plan: [place] / Product Type: HMO /     Subjective     Pt reports: she is doing okay. Did more of the open books that was not given for HEP.    She was compliant with home exercise program.  Response to previous treatment: positive   Functional change: none reported    Pain: 3/10 neck; 4/10 back  Location: Occ ridge into skull and Upper Trapezius and midline; Lumbar into sacrum, radiates into glutes and thighs (>Left)     Objective      Quad flexibility: Mild restriction Bilateral   Innominates testing symmetrical in supine 10/7/2024    Treatment     Alea received the treatments listed below:      Alea received therapeutic exercises to develop strength, endurance, ROM, and flexibility for 10 minutes including:  NMRE utilized to activate appropriate mm to improve posture, core and lumbar stabilization and lumbopelvic stability: 43 minutes     TABLE:  Lumbar rotation, 10x Bilateral   Open books, 5x Bilateral, do not move arm down   TrA Ab brace, 01q8jui  Add next Sidelying clamshells - NP  Prone quad stretch,  3t76ryo Bilateral      SEATED:  Seated Physioball: lumbar flexion / Alternate side to side, 10x Bilateral   Hamstring stretch, 7b14wvo Bilateral   Shoulder shrugs, 10x  T/s extension stretch, 75b8unn  Trap stretch, 2s78lrh  Bilateral Shoulder External Rotation, 83o6kkb  Chin tucks, 10-43t4jgy  C/S AROM: Flex/Ext / Rotation, 10x ea    Doorway stretch, W arms, 2a52myc         Add NEXT: pt prone to spasms and high sensitivity/fear avoidance  Strengthen:Core / Biceps / Triceps / RTC / Mid trap  Focus posture  FUTURE:  Morning mobility routine  McK lumbar extension  Ext / Rows / Prone T       Patient Education and Home Exercises       Education provided:   - daily HEP  - utilize heat in mornings; ice with flare-up; both 10-20 minute max / ice post tx if sore      Written Home Exercises Provided: Patient instructed to cont prior HEP. Exercises were reviewed and Alea was able to demonstrate them prior to the end of the session.  Alea demonstrated good  understanding of the education provided. See EMR under Patient Instructions for exercises provided during therapy sessions    Assessment     Pt demonstrates variable levels of pain, decreased cervical Range of Motion, core, trunk and hip weakness, and balance and gait deficits. Pt able to perform all therex given with minimal pn provocation. Focus on spine mobility and decreasing sensitivity to movement prior to strengthening program. Note Excessive sweating in a very cool clinic; deferred water. Continue to progress as tolerated.      Alea Is progressing well towards her goals.   Pt prognosis is Good.     Pt will continue to benefit from skilled outpatient physical therapy to address the deficits listed in the problem list box on initial evaluation, provide pt/family education and to maximize pt's level of independence in the home and community environment.     Pt's spiritual, cultural and educational needs considered and pt agreeable to plan of care and goals.      Anticipated barriers to physical therapy: chronicity of condition    Goals:   Short Term GOALS: 3 weeks. Pt agrees with goals set.  1. Patient demonstrates compliance with HEP.   2. Patient demonstrates independence with Postural Awareness while sitting and standing.   3. Patient demonstrates decreased pain level of 3/10 while performing daily activities.  4. Patient will improve Hamstring and pectoralis flexibility to mild restriction.     Long Term GOALS: 6 weeks. Pt agrees with goals set.  1. Patient demonstrates increased C/S AROM to improve tolerance to daily activities and turning head while driving.   2. Patient demonstrates increased core, hip and BLE strength by 1/2 grade or greater to improve tolerance to home chores.  3. Patient demonstrates increased periscapular muscle and Bilateral Upper Extremities strength by 1/2 grade or more to improve tolerance to work.  4. Patient demonstrates independence with body mechanics while working.  5. Patient demonstrates independence with HEP.   6. Patient will improve FOTO function score by 25% to show improvement in condition and functional mobility.     Plan     Continue PT as deemed per POC: spine mobility, core and trunk strengthening    Smiley Lima DPT

## 2024-10-09 ENCOUNTER — CLINICAL SUPPORT (OUTPATIENT)
Dept: REHABILITATION | Facility: HOSPITAL | Age: 34
End: 2024-10-09
Payer: COMMERCIAL

## 2024-10-09 DIAGNOSIS — R19.8 ABDOMINAL WEAKNESS: ICD-10-CM

## 2024-10-09 DIAGNOSIS — R29.3 POSTURE IMBALANCE: Primary | ICD-10-CM

## 2024-10-09 DIAGNOSIS — R68.89 IMPAIRED TOLERANCE OF ACTIVITY: ICD-10-CM

## 2024-10-09 PROCEDURE — 97112 NEUROMUSCULAR REEDUCATION: CPT | Mod: PN

## 2024-10-09 PROCEDURE — 97110 THERAPEUTIC EXERCISES: CPT | Mod: PN

## 2024-10-14 ENCOUNTER — CLINICAL SUPPORT (OUTPATIENT)
Dept: REHABILITATION | Facility: HOSPITAL | Age: 34
End: 2024-10-14
Payer: COMMERCIAL

## 2024-10-14 DIAGNOSIS — R29.3 POSTURE IMBALANCE: Primary | ICD-10-CM

## 2024-10-14 DIAGNOSIS — R19.8 ABDOMINAL WEAKNESS: ICD-10-CM

## 2024-10-14 DIAGNOSIS — R68.89 IMPAIRED TOLERANCE OF ACTIVITY: ICD-10-CM

## 2024-10-14 PROCEDURE — 97110 THERAPEUTIC EXERCISES: CPT | Mod: PN

## 2024-10-14 PROCEDURE — 97112 NEUROMUSCULAR REEDUCATION: CPT | Mod: PN

## 2024-10-14 NOTE — PROGRESS NOTES
[facility]   Physical Therapy Treatment Note      Name: Alea Barrett  Clinic Number: [id number]    Therapy Diagnosis:   Encounter Diagnoses   Name Primary?    Posture imbalance Yes    Abdominal weakness     Impaired tolerance of activity        Physician: Estefania Benson NP    Visit Date: 10/14/2024    Physician Orders: PT Eval and Treat   Medical Diagnosis from Referral:   M54.2 (ICD-10-CM) - Cervicalgia   M54.16 (ICD-10-CM) - Lumbar radicular pain   Evaluation Date: 10/2/2024  Authorization Period Expiration: 12/31/2024  Plan of Care Expiration: 12/18/2024 or 12v post eval  Visit # (episodes)/ Visits authorized: 4 / eval +20 (POC 3 / 12 )  2nd FOTO visit 5  3rd FOTO visit 10  Progress Note Due: 11/2/2024  PTA: 0/5     Time In: 1100  Time Out: 1153  Total Billable Time: 53 minutes     Precautions: Anxiety; Depression  Insurance: Payor: [employer] / Plan: [place] / Product Type: HMO /     Subjective     Pt reports: she was really sore on Thursday and Friday. Then better but still feeling twinges on the left side.    She was compliant with home exercise program.  Response to previous treatment: positive   Functional change: none reported    Pain: 3/10 neck; 4/10 back  Location: Occ ridge into skull and Upper Trapezius and midline; Lumbar into sacrum, radiates into glutes and thighs (>Left)     Objective      Psoas flexibility: Moderate restriction Bilateral     Treatment     Alea received the treatments listed below:      Alea received therapeutic exercises to develop strength, endurance, ROM, and flexibility for 10 minutes including:  NMRE utilized to activate appropriate mm to improve posture, core and lumbar stabilization and lumbopelvic stability: 43 minutes     TABLE:  Lumbar rotation, 10x Bilateral    TrA Ab brace, 31m1yyd  TrA Ab brace with Bent Knee Fall Out, 2x10 Bilateral   Bridge, 2x10 Bilateral (hep)  Bilateral Sciatic Nerve glide, 20x  Bilateral (hep)  Sidelying clamshells, 2x10 Bilateral (hep)  +CAROL stretch, 0h75tzp, started feeling spasm coming on after 2  HOLD Adam stretch, 3q28wci - had muscles spasms after relieved by rahul pose, 15 seconds next time   Prone quad stretch, 3d12gty Bilateral - NP    SEATED:  Seated Physioball: lumbar flexion / Alternate side to side, 10x Bilateral   Hamstring stretch, 9l74uuu Bilateral   +Piriformis stretch, 2k30iou Bilateral (hep)  Bilateral Shoulder External Rotation, 51f5iml (hep)  T/s extension stretch, 33t3rqo  Chin tucks, 10-83b5vby  C/S AROM: Flex/Ext / Rotation, 10x ea    Doorway stretch, W arms, 7l46zjw  McK lumbar extension, 72j2rga    NOT PERFORMED   Shoulder shrugs, 10x  Trap stretch, 6w13cam        Add NEXT: pt prone to spasms and high sensitivity/fear avoidance  Strengthen:Core / Biceps / Triceps / RTC / Mid trap  Focus posture  FUTURE:  Morning mobility routine  Ext / Rows / Prone T       Patient Education and Home Exercises       Education provided:   - daily HEP  - utilize heat in mornings; ice with flare-up; both 10-20 minute max / ice post tx if sore      Written Home Exercises Provided: Patient instructed to cont prior HEP. Exercises were reviewed and Alea was able to demonstrate them prior to the end of the session.  Alea demonstrated good  understanding of the education provided. See EMR under Patient Instructions for exercises provided during therapy sessions    Assessment     Pt demonstrates variable levels of pain, decreased cervical Range of Motion, core, trunk and hip weakness, and balance and gait deficits. Pt able to perform all therex given with minimal pn provocation. Attempted CAROL stretch for hip flexor stretching, had spasms after 2 relieved by rahul pose. Still guarded and cautious with mobilizing on table. Continue to progress as tolerated.      Alea Is progressing well towards her goals.   Pt prognosis is Good.     Pt will continue to benefit from skilled outpatient  physical therapy to address the deficits listed in the problem list box on initial evaluation, provide pt/family education and to maximize pt's level of independence in the home and community environment.     Pt's spiritual, cultural and educational needs considered and pt agreeable to plan of care and goals.     Anticipated barriers to physical therapy: chronicity of condition    Goals:   Short Term GOALS: 3 weeks. Pt agrees with goals set. PROGRESS 10/14/2024  1. Patient demonstrates compliance with HEP.   2. Patient demonstrates independence with Postural Awareness while sitting and standing.   3. Patient demonstrates decreased pain level of 3/10 while performing daily activities.  4. Patient will improve Hamstring and pectoralis flexibility to mild restriction.     Long Term GOALS: 6 weeks. Pt agrees with goals set.  1. Patient demonstrates increased C/S AROM to improve tolerance to daily activities and turning head while driving.   2. Patient demonstrates increased core, hip and BLE strength by 1/2 grade or greater to improve tolerance to home chores.  3. Patient demonstrates increased periscapular muscle and Bilateral Upper Extremities strength by 1/2 grade or more to improve tolerance to work.  4. Patient demonstrates independence with body mechanics while working.  5. Patient demonstrates independence with HEP.   6. Patient will improve FOTO function score by 25% to show improvement in condition and functional mobility.     Plan     Continue PT as deemed per POC: spine mobility, core and trunk strengthening    Smiley Lima DPT

## 2024-10-16 ENCOUNTER — CLINICAL SUPPORT (OUTPATIENT)
Dept: REHABILITATION | Facility: HOSPITAL | Age: 34
End: 2024-10-16
Payer: COMMERCIAL

## 2024-10-16 DIAGNOSIS — R19.8 ABDOMINAL WEAKNESS: ICD-10-CM

## 2024-10-16 DIAGNOSIS — R68.89 IMPAIRED TOLERANCE OF ACTIVITY: ICD-10-CM

## 2024-10-16 DIAGNOSIS — R29.3 POSTURE IMBALANCE: Primary | ICD-10-CM

## 2024-10-16 PROCEDURE — 97110 THERAPEUTIC EXERCISES: CPT | Mod: PN,CQ

## 2024-10-16 PROCEDURE — 97112 NEUROMUSCULAR REEDUCATION: CPT | Mod: PN,CQ

## 2024-10-16 PROCEDURE — 97530 THERAPEUTIC ACTIVITIES: CPT | Mod: PN,CQ

## 2024-10-16 NOTE — PROGRESS NOTES
[facility]   Physical Therapy Treatment Note      Name: Alea Barrett  Clinic Number: [id number]    Therapy Diagnosis:   Encounter Diagnoses   Name Primary?    Posture imbalance Yes    Abdominal weakness     Impaired tolerance of activity        Physician: Estefania Benson NP    Visit Date: 10/16/2024    Physician Orders: PT Eval and Treat   Medical Diagnosis from Referral:   M54.2 (ICD-10-CM) - Cervicalgia   M54.16 (ICD-10-CM) - Lumbar radicular pain   Evaluation Date: 10/2/2024  Authorization Period Expiration: 12/31/2024  Plan of Care Expiration: 12/18/2024 or 12v post eval  Visit # (episodes)/ Visits authorized: 5 / eval +20 (POC 4 / 12 )  2nd FOTO visit 5  3rd FOTO visit 10  Progress Note Due: 11/2/2024  PTA: 1/5     Time In: 4:03  Time Out: 4:50  Total Billable Time: 47 minutes     Precautions: Anxiety; Depression  Insurance: Payor: [employer] / Plan: [place] / Product Type: HMO /     Subjective     Pt reports: still getting some pain along the low back, remains pretty consistent. Neck has been doing a bit better since starting therapy. Noticed some onset of left pectoral tightness today.    She was compliant with home exercise program.  Response to previous treatment: did fine  Functional change: none reported    Pain: 3/10 neck; 4/10 back  Location: Occ ridge into skull and Upper Trapezius and midline; Lumbar into sacrum, radiates into glutes and thighs (>Left)     Objective      Psoas flexibility: Moderate restriction Bilateral     Treatment     Alea received the treatments listed below:      Alea received therapeutic exercises to develop strength, endurance, ROM, and flexibility for 10 minutes including:  NMRE utilized to activate appropriate mm to improve posture, core and lumbar stabilization and lumbopelvic stability: 27 minutes     TABLE:  Lumbar rotation, 10x Bilateral    TrA Ab brace, 23z3yal  TrA Ab brace with Bent Knee Fall Out, 2x10  Bilateral   Bridge, 2x10 Bilateral (hep)  Bilateral Sciatic Nerve glide, 20x Bilateral (hep)  Sidelying clamshells, 2x10 Bilateral (hep)  +CAROL stretch, 8h76rfh, started feeling spasm coming on after 2  HOLD Adam stretch, 2p11pzz - had muscles spasms after relieved by rahul pose, 15 seconds next time   Prone quad stretch, 4n71bmd Bilateral - NP    NOT PERFORMED) SEATED:  Seated Physioball: lumbar flexion / Alternate side to side, 10x Bilateral   Hamstring stretch, 6x91jpa Bilateral   +Piriformis stretch, 8g88csk Bilateral (hep)  Bilateral Shoulder External Rotation, 35l6gen (hep)  T/s extension stretch, 38m7wte  Chin tucks, 10-76b5ans  C/S AROM: Flex/Ext / Rotation, 10x ea    Doorway stretch, W arms, 8k45zkk  McK lumbar extension, 21x8ssc    NOT PERFORMED   Shoulder shrugs, 10x  Trap stretch, 5h70zay    Therapeutic activities to improve functional performance for 10 minutes including:  Lifting mechanics to get trays off shelves and buckets off ground at work to reduce bending and twisting motions that exacerbate pain.  Reiterated importance to core stabilization while lifting or carrying to reduce lumbar strain.    Add NEXT: pt prone to spasms and high sensitivity/fear avoidance  Strengthen:Core / Biceps / Triceps / RTC / Mid trap  Focus posture  FUTURE:  Morning mobility routine  Ext / Rows / Prone T       Patient Education and Home Exercises       Education provided:   - daily HEP  - utilize heat in mornings; ice with flare-up; both 10-20 minute max / ice post tx if sore      Written Home Exercises Provided: Patient instructed to cont prior HEP. Exercises were reviewed and Alea was able to demonstrate them prior to the end of the session.  Alea demonstrated good  understanding of the education provided. See EMR under Patient Instructions for exercises provided during therapy sessions    Assessment     Pt with overall good tolerance to completed exercises with mild reduction of symptoms following mat  exercises. Able to perform lifting mechanics and techniques without symptom provocation, will implement tomorrow at work and assess tolerance. Still guarded and cautious with exercises and mobilizing on table. Continue to progress as tolerated.      Alea Is progressing well towards her goals.   Pt prognosis is Good.     Pt will continue to benefit from skilled outpatient physical therapy to address the deficits listed in the problem list box on initial evaluation, provide pt/family education and to maximize pt's level of independence in the home and community environment.     Pt's spiritual, cultural and educational needs considered and pt agreeable to plan of care and goals.     Anticipated barriers to physical therapy: chronicity of condition    Goals:   Short Term GOALS: 3 weeks. Pt agrees with goals set. PROGRESS 10/16/2024  1. Patient demonstrates compliance with HEP.   2. Patient demonstrates independence with Postural Awareness while sitting and standing.   3. Patient demonstrates decreased pain level of 3/10 while performing daily activities.  4. Patient will improve Hamstring and pectoralis flexibility to mild restriction.     Long Term GOALS: 6 weeks. Pt agrees with goals set.  1. Patient demonstrates increased C/S AROM to improve tolerance to daily activities and turning head while driving.   2. Patient demonstrates increased core, hip and BLE strength by 1/2 grade or greater to improve tolerance to home chores.  3. Patient demonstrates increased periscapular muscle and Bilateral Upper Extremities strength by 1/2 grade or more to improve tolerance to work.  4. Patient demonstrates independence with body mechanics while working.  5. Patient demonstrates independence with HEP.   6. Patient will improve FOTO function score by 25% to show improvement in condition and functional mobility.     Plan     Continue PT as deemed per POC: spine mobility, core and trunk strengthening    Estefany Perales, PTA

## 2024-10-21 ENCOUNTER — CLINICAL SUPPORT (OUTPATIENT)
Dept: REHABILITATION | Facility: HOSPITAL | Age: 34
End: 2024-10-21
Payer: COMMERCIAL

## 2024-10-21 DIAGNOSIS — R68.89 IMPAIRED TOLERANCE OF ACTIVITY: ICD-10-CM

## 2024-10-21 DIAGNOSIS — R19.8 ABDOMINAL WEAKNESS: ICD-10-CM

## 2024-10-21 DIAGNOSIS — R29.3 POSTURE IMBALANCE: Primary | ICD-10-CM

## 2024-10-21 PROCEDURE — 97112 NEUROMUSCULAR REEDUCATION: CPT | Mod: PN

## 2024-10-21 PROCEDURE — 97110 THERAPEUTIC EXERCISES: CPT | Mod: PN

## 2024-10-21 NOTE — PROGRESS NOTES
[facility]   Physical Therapy Treatment Note      Name: Alea Barrett  Clinic Number: [id number]    Therapy Diagnosis:   Encounter Diagnoses   Name Primary?    Posture imbalance Yes    Abdominal weakness     Impaired tolerance of activity        Physician: Estefania Benson NP    Visit Date: 10/21/2024    Physician Orders: PT Eval and Treat   Medical Diagnosis from Referral:   M54.2 (ICD-10-CM) - Cervicalgia   M54.16 (ICD-10-CM) - Lumbar radicular pain   Evaluation Date: 10/2/2024  Authorization Period Expiration: 12/31/2024  Plan of Care Expiration: 12/18/2024 or 12v post eval  Visit # (episodes)/ Visits authorized: 6 / eval +20 (POC 5 / 12 )  2nd FOTO visit 5 - 10/21/2024  3rd FOTO visit 10  Progress Note Due: 11/2/2024  PTA: 1/5     Time In: 1100  Time Out: 1155  Total Billable Time: 55 minutes     Precautions: Anxiety; Depression  Insurance: Payor: [employer] / Plan: [place] / Product Type: HMO /     Subjective     Pt reports: Feels like her neck is getting better. Is not moving around like an old lady.    She was compliant with home exercise program.  Response to previous treatment: did fine  Functional change: none reported    Pain: 3/10 neck; 4/10 back  Location: Occ ridge into skull and Upper Trapezius and midline; Lumbar into sacrum, radiates into glutes and thighs (>Left)     Objective      Psoas flexibility: Moderate restriction Bilateral     Treatment     Alea received the treatments listed below:      Alea received therapeutic exercises to develop strength, endurance, ROM, and flexibility for 27 minutes including:  NMRE utilized to activate appropriate mm to improve posture, core and lumbar stabilization and lumbopelvic stability: 28 minutes     TABLE:  Lumbar rotation, 10x Bilateral    TrA Ab brace, 90h3olv  TrA Ab brace with Bent Knee Fall Out, 2x10 Bilateral - Yellow Theraband next  Bridge, 2x10 Bilateral (hep) - Theraband next  Bilateral  Sciatic Nerve glide, 20x Bilateral (hep)  Yellow Theraband next Sidelying clamshells, 2x10 Bilateral (hep)    NOT PERFORMED   CAROL stretch, 5c98zdy, started feeling spasm coming on after 2  HOLD Adam stretch, 0s11jyg - had muscles spasms after relieved by rahul pose, 15 seconds next time HOLD  Prone quad stretch, 4j58jhy Bilateral     SEATED:  Seated Physioball: lumbar flexion / Alternate side to side, 10x Bilateral   Hamstring stretch, 4h37qae Bilateral   Piriformis stretch, 0s49hxj Bilateral (hep)  Bilateral Shoulder External Rotation, 04u5qpn (hep)  T/s extension stretch, 51n2ajf    NOT PERFORMED   Chin tucks, 10-52q4cpk  C/S AROM: Flex/Ext / Rotation, 10x ea    Doorway stretch, W arms, 5m42wff  McK lumbar extension, 59n9smn    NOT PERFORMED   Shoulder shrugs, 10x  Trap stretch, 5n27tgf    Therapeutic activities to improve functional performance for 0 minutes including:  Lifting mechanics to get trays off shelves and buckets off ground at work to reduce bending and twisting motions that exacerbate pain.  Reiterated importance to core stabilization while lifting or carrying to reduce lumbar strain.    Add NEXT: pt prone to spasms and high sensitivity/fear avoidance  Strengthen:Core / Biceps / Triceps / RTC / Mid trap  Cable Column   Focus posture  FUTURE:  Morning mobility routine  Ext / Rows / Prone T       Patient Education and Home Exercises       Education provided:   - daily HEP  - utilize heat in mornings; ice with flare-up; both 10-20 minute max / ice post tx if sore      Written Home Exercises Provided: Patient instructed to cont prior HEP. Exercises were reviewed and Alea was able to demonstrate them prior to the end of the session.  Alea demonstrated good  understanding of the education provided. See EMR under Patient Instructions for exercises provided during therapy sessions    Assessment     Pt with overall improved mobility and execution of exercises. Focus on core strengthening and posture  today. No muscles spasms during session today. Continue to progress as tolerated.      Alea Is progressing well towards her goals.   Pt prognosis is Good.     Pt will continue to benefit from skilled outpatient physical therapy to address the deficits listed in the problem list box on initial evaluation, provide pt/family education and to maximize pt's level of independence in the home and community environment.     Pt's spiritual, cultural and educational needs considered and pt agreeable to plan of care and goals.     Anticipated barriers to physical therapy: chronicity of condition    Goals:   Short Term GOALS: 3 weeks. Pt agrees with goals set. PROGRESS 10/21/2024  1. Patient demonstrates compliance with HEP.   2. Patient demonstrates independence with Postural Awareness while sitting and standing.   3. Patient demonstrates decreased pain level of 3/10 while performing daily activities.  4. Patient will improve Hamstring and pectoralis flexibility to mild restriction.     Long Term GOALS: 6 weeks. Pt agrees with goals set.  1. Patient demonstrates increased C/S AROM to improve tolerance to daily activities and turning head while driving.   2. Patient demonstrates increased core, hip and BLE strength by 1/2 grade or greater to improve tolerance to home chores.  3. Patient demonstrates increased periscapular muscle and Bilateral Upper Extremities strength by 1/2 grade or more to improve tolerance to work.  4. Patient demonstrates independence with body mechanics while working.  5. Patient demonstrates independence with HEP.   6. Patient will improve FOTO function score by 25% to show improvement in condition and functional mobility.     Plan     Continue PT as deemed per POC: spine mobility, core and trunk strengthening    Smiley Lima, PT

## 2024-10-23 ENCOUNTER — CLINICAL SUPPORT (OUTPATIENT)
Dept: REHABILITATION | Facility: HOSPITAL | Age: 34
End: 2024-10-23
Payer: COMMERCIAL

## 2024-10-23 DIAGNOSIS — R68.89 IMPAIRED TOLERANCE OF ACTIVITY: ICD-10-CM

## 2024-10-23 DIAGNOSIS — R19.8 ABDOMINAL WEAKNESS: ICD-10-CM

## 2024-10-23 DIAGNOSIS — R29.3 POSTURE IMBALANCE: Primary | ICD-10-CM

## 2024-10-23 PROCEDURE — 97112 NEUROMUSCULAR REEDUCATION: CPT | Mod: PN,CQ

## 2024-10-23 PROCEDURE — 97530 THERAPEUTIC ACTIVITIES: CPT | Mod: PN,CQ

## 2024-10-23 PROCEDURE — 97110 THERAPEUTIC EXERCISES: CPT | Mod: PN,CQ

## 2024-10-23 NOTE — PROGRESS NOTES
[facility]   Physical Therapy Treatment Note      Name: Alea Barrett  Clinic Number: [id number]    Therapy Diagnosis:   Encounter Diagnoses   Name Primary?    Posture imbalance Yes    Abdominal weakness     Impaired tolerance of activity        Physician: Estefania Benson NP    Visit Date: 10/23/2024    Physician Orders: PT Eval and Treat   Medical Diagnosis from Referral:   M54.2 (ICD-10-CM) - Cervicalgia   M54.16 (ICD-10-CM) - Lumbar radicular pain   Evaluation Date: 10/2/2024  Authorization Period Expiration: 12/31/2024  Plan of Care Expiration: 12/18/2024 or 12v post eval  Visit # (episodes)/ Visits authorized: 7 / eval +20 (POC 6 / 12 )  2nd FOTO visit 5 - 10/21/2024  3rd FOTO visit 10  Progress Note Due: 11/2/2024  PTA: 1/5     Time In: 4:43  Time Out: 5:38  Total Billable Time: 55 minutes     Precautions: Anxiety; Depression  Insurance: Payor: [employer] / Plan: [place] / Product Type: HMO /     Subjective     Pt reports: neck doing okay, low back remains most bothersome. Weighed some of her items she is having to lift at work and sacks of flour are 50#, buckets of fudge are 60#, sacks of dough mix are 100#, and buckets of pudding are 30#. Does not have to carry things far but still have to lift and move them.    She was compliant with home exercise program.  Response to previous treatment: did fine  Functional change: none reported    Pain: 3/10 neck; 3/10 back  Location: Occ ridge into skull and Upper Trapezius and midline; Lumbar into sacrum, radiates into glutes and thighs (>Left)     Objective      Psoas flexibility: Moderate restriction Bilateral     Treatment     Alea received the treatments listed below:      Alea received therapeutic exercises to develop strength, endurance, ROM, and flexibility for 25 minutes including:  NMRE utilized to activate appropriate mm to improve posture, core and lumbar stabilization and lumbopelvic stability: 20  "minutes     TABLE:  Lumbar rotation, 10x Bilateral    Figure 4 piriformis stretch 10" x 5 each  TrA Ab brace, 05u0utw  TrA Ab brace with Bent Knee Fall Out, 2x10 Bilateral - Yellow Theraband  Bridge, 2x10 Bilateral (hep) - Yellow Theraband  Bilateral Sciatic Nerve glide, 20x Bilateral (hep)  Yellow Theraband Sidelying clamshells, 2x10 Bilateral (hep)    +Mirtha pose x 30" to center and x 30" with hands toward R for greater L flank stretch    NOT PERFORMED   CAROL stretch, 3n71dmn, started feeling spasm coming on after 2  HOLD Adam stretch, 0z25qjv - had muscles spasms after relieved by mirtha pose, 15 seconds next time HOLD  Prone quad stretch, 5z59tag Bilateral     SEATED:  Seated Physioball: lumbar flexion / Alternate side to side, 10x Bilateral   Hamstring stretch, 4z67ulm Bilateral   NP-- Piriformis stretch, 0s37sti Bilateral (hep)  Bilateral Shoulder External Rotation, 60o6mxw (hep)  T/s extension stretch, 21t3ywg  +Attempted seated abdominal oblique isometric but stopped due to onset of L lumbar discomfort    NOT PERFORMED   Chin tucks, 10-58v9aib  C/S AROM: Flex/Ext / Rotation, 10x ea    Doorway stretch, W arms, 6e12kyd  McK lumbar extension, 68p4vzc    NOT PERFORMED   Shoulder shrugs, 10x  Trap stretch, 3f50gjr    Therapeutic activities to improve functional performance for 10 minutes including:  Posture awareness with standing, turning, sitting to avoid being in excessive anterior or posterior pelvic tilts.   +Reiterated importance to core stabilization while lifting or carrying to reduce lumbar strain.    Add NEXT: pt prone to spasms and high sensitivity/fear avoidance  Strengthen:Core / Biceps / Triceps / RTC / Mid trap  Cable Column   Focus posture  FUTURE:  Morning mobility routine  Ext / Rows / Prone T       Patient Education and Home Exercises       Education provided:   - daily HEP  - utilize heat in mornings; ice with flare-up; both 10-20 minute max / ice post tx if sore      Written Home Exercises " Provided: Patient instructed to cont prior HEP. Exercises were reviewed and Alea was able to demonstrate them prior to the end of the session.  Alea demonstrated good  understanding of the education provided. See EMR under Patient Instructions for exercises provided during therapy sessions    Assessment     Pt presents with mild increase of lumbar soreness and tightness, lessened with gentle stretching but remains limited due to core weakness and reduced activation for abdominal stabilization. Focus this session on postural awareness and core activation and hip strength. No muscles spasms during session today but did hat left lumbar tightness that loosened some with stretching. Continue to progress as tolerated.      Alea Is progressing well towards her goals.   Pt prognosis is Good.     Pt will continue to benefit from skilled outpatient physical therapy to address the deficits listed in the problem list box on initial evaluation, provide pt/family education and to maximize pt's level of independence in the home and community environment.     Pt's spiritual, cultural and educational needs considered and pt agreeable to plan of care and goals.     Anticipated barriers to physical therapy: chronicity of condition    Goals:   Short Term GOALS: 3 weeks. Pt agrees with goals set. PROGRESS 10/23/2024  1. Patient demonstrates compliance with HEP.   2. Patient demonstrates independence with Postural Awareness while sitting and standing.   3. Patient demonstrates decreased pain level of 3/10 while performing daily activities.  4. Patient will improve Hamstring and pectoralis flexibility to mild restriction.     Long Term GOALS: 6 weeks. Pt agrees with goals set.  1. Patient demonstrates increased C/S AROM to improve tolerance to daily activities and turning head while driving.   2. Patient demonstrates increased core, hip and BLE strength by 1/2 grade or greater to improve tolerance to home chores.  3. Patient  demonstrates increased periscapular muscle and Bilateral Upper Extremities strength by 1/2 grade or more to improve tolerance to work.  4. Patient demonstrates independence with body mechanics while working.  5. Patient demonstrates independence with HEP.   6. Patient will improve FOTO function score by 25% to show improvement in condition and functional mobility.     Plan     Continue PT as deemed per POC: spine mobility, core and trunk strengthening    Estefany Perales, PTA

## 2024-10-28 ENCOUNTER — CLINICAL SUPPORT (OUTPATIENT)
Dept: REHABILITATION | Facility: HOSPITAL | Age: 34
End: 2024-10-28
Payer: COMMERCIAL

## 2024-10-28 DIAGNOSIS — R68.89 IMPAIRED TOLERANCE OF ACTIVITY: ICD-10-CM

## 2024-10-28 DIAGNOSIS — R19.8 ABDOMINAL WEAKNESS: ICD-10-CM

## 2024-10-28 DIAGNOSIS — R29.3 POSTURE IMBALANCE: Primary | ICD-10-CM

## 2024-10-28 PROCEDURE — 97110 THERAPEUTIC EXERCISES: CPT | Mod: PN

## 2024-10-28 PROCEDURE — 97112 NEUROMUSCULAR REEDUCATION: CPT | Mod: PN

## 2024-10-28 PROCEDURE — 97140 MANUAL THERAPY 1/> REGIONS: CPT | Mod: PN

## 2024-10-30 ENCOUNTER — CLINICAL SUPPORT (OUTPATIENT)
Dept: REHABILITATION | Facility: HOSPITAL | Age: 34
End: 2024-10-30
Payer: COMMERCIAL

## 2024-10-30 DIAGNOSIS — R68.89 IMPAIRED TOLERANCE OF ACTIVITY: ICD-10-CM

## 2024-10-30 DIAGNOSIS — R29.3 POSTURE IMBALANCE: Primary | ICD-10-CM

## 2024-10-30 DIAGNOSIS — R19.8 ABDOMINAL WEAKNESS: ICD-10-CM

## 2024-10-30 PROCEDURE — 97112 NEUROMUSCULAR REEDUCATION: CPT | Mod: PN

## 2024-10-30 PROCEDURE — 97110 THERAPEUTIC EXERCISES: CPT | Mod: PN

## 2024-11-04 ENCOUNTER — CLINICAL SUPPORT (OUTPATIENT)
Dept: REHABILITATION | Facility: HOSPITAL | Age: 34
End: 2024-11-04
Payer: COMMERCIAL

## 2024-11-04 DIAGNOSIS — R19.8 ABDOMINAL WEAKNESS: ICD-10-CM

## 2024-11-04 DIAGNOSIS — R68.89 IMPAIRED TOLERANCE OF ACTIVITY: ICD-10-CM

## 2024-11-04 DIAGNOSIS — R29.3 POSTURE IMBALANCE: Primary | ICD-10-CM

## 2024-11-04 PROCEDURE — 97112 NEUROMUSCULAR REEDUCATION: CPT | Mod: PN

## 2024-11-04 PROCEDURE — 97110 THERAPEUTIC EXERCISES: CPT | Mod: PN

## 2024-11-04 PROCEDURE — 97530 THERAPEUTIC ACTIVITIES: CPT | Mod: PN

## 2024-11-04 NOTE — PROGRESS NOTES
[facility]   Physical Therapy Treatment Note      Name: Alea Barrett  Clinic Number: [id number]    Therapy Diagnosis:   Encounter Diagnoses   Name Primary?    Posture imbalance Yes    Abdominal weakness     Impaired tolerance of activity        Physician: Estefania Benson NP    Visit Date: 11/4/2024    Physician Orders: PT Eval and Treat   Medical Diagnosis from Referral:   M54.2 (ICD-10-CM) - Cervicalgia   M54.16 (ICD-10-CM) - Lumbar radicular pain   Evaluation Date: 10/2/2024  Authorization Period Expiration: 12/31/2024  Plan of Care Expiration: 12/18/2024 or 12v post eval  Visit # (episodes)/ Visits authorized: 10 / eval +20 (POC 9 / 12 )  2nd FOTO visit 5 - 10/21/2024  3rd FOTO visit 10  Progress Note Due: 11/2/2024  PTA: 1/5     Time In: 1100  Time Out: 1155  Total Billable Time: 55 minutes     Precautions: Anxiety; Depression  Insurance: Payor: [employer] / Plan: [place] HMO / Product Type: HMO /     Subjective     Pt reports: she is feeling much better in her left hip and not really having any pain.    She was compliant with home exercise program.  Response to previous treatment: did fine  Functional change: none reported    Pain: 2/10 neck; 2/10 back  Location: Occ ridge into skull and Upper Trapezius and midline; Lumbar into sacrum, radiates into glutes and thighs (>Left)     Objective      Psoas flexibility: Moderate restriction Bilateral     Treatment     Alea received the treatments listed below:      Alea received therapeutic exercises to develop strength, endurance, ROM, and flexibility for 12 minutes including:  NMRE utilized to activate appropriate mm to improve posture, core and lumbar stabilization and lumbopelvic stability: 33 minutes     STAND:  Red Theraband Bilateral Shoulder External Rotation, 74u1dxh (hep)  +Cable Column: Extension / Adduction / Tri Ext, 3#   +Bicep curls, 3#  +Green Theraband shoulder horizontal abduction  +Blue tube  "shoulder External Rotation/Internal Rotation   ADD Cable Column Core: Oblique paloff press, 7# / Retro walk, 13# (17 next) / ADD chest press and   ADD lateral steps  ADD prone donkey kicks  Standing T/s rotation, 2x5 Bilateral - no spasm but reports left shoulder instability - HOLD    ADD Prone donkey kicks    TABLE:  Lumbar rotation, 10x Bilateral    TrA Ab brace, 42x2upc  TrA Ab brace with Bent Knee Fall Out, 2x10 Bilateral - Yellow Theraband  Bridge, 2x10 Bilateral (hep) - Yellow Theraband  Yellow Theraband Sidelying clamshells, 2x10 Bilateral (hep)  Mod oblique crunch, opp hand taps opp knee, 2x10   Bilateral Sciatic Nerve glide, 20x Bilateral (hep)  Advanced Figure 4 piriformis stretch 7b62inw each  Prone quad stretch, 7i02gex Bilateral   Add Prone donkey kicks (monitor spasm)    Manual tx: 0 minutes  Myofascial & TP Release to Left Quadratus Lumborum, glute and Iliotibial Band  Massage roller to Left glute and Iliotibial Band      NOT PERFORMED   Mirtha pose x 30" to center and x 30" with hands toward R for greater L flank stretch  CAROL stretch, 6w07lqe, started feeling spasm coming on after 2  HOLD Adam stretch, 2o98gwf - had muscles spasms after relieved by mirtha pose, 15 seconds next time HOLD    SEATED:  Seated Physioball: lumbar flexion / Alternate side to side, 10x Bilateral   Piriformis stretch, 8s03nwv Bilateral (hep)    NOT PERFORMED   T/s extension stretch, 14i3lhv  Hamstring stretch, 4x29nfj Bilateral   Chin tucks, 10-91g3blv  C/S AROM: Flex/Ext / Rotation, 10x ea  Shoulder shrugs, 10x  Trap stretch, 5c41skn  Doorway stretch, W arms, 4y10ljg  McK lumbar extension, 22m0knk      Therapeutic activities to improve functional performance for 10 minutes including:    Morning mobility routine: lumbar and cervical region Range of Motion in all planes      NOT PERFORMED   Posture awareness with standing, turning, sitting to avoid being in excessive anterior or posterior pelvic tilts.   +Reiterated " importance to core stabilization while lifting or carrying to reduce lumbar strain.      Add NEXT: pt prone to spasms and high sensitivity/fear avoidance  Self massage with Tennis ball  Cable Column     FUTURE:  Morning mobility routine  Prone T       Patient Education and Home Exercises       Education provided:   - daily HEP  - utilize heat in mornings; ice with flare-up; both 10-20 minute max / ice post tx if sore      Written Home Exercises Provided: Patient instructed to cont prior HEP. Exercises were reviewed and Alea was able to demonstrate them prior to the end of the session.  Alea demonstrated good  understanding of the education provided. See EMR under Patient Instructions for exercises provided during therapy sessions    Assessment     Pt demonstrates low levels of pain today, decreased cervical Range of Motion, core, trunk and hip weakness, and balance and gait deficits. Pt able to perform all therex given with minimal pn provocation. Added more shoulder stabilization to reduce left shoulder instability that is possibly pulling her left lateral chain off center. Continue to progress as tolerated.      Alea Is progressing well towards her goals.   Pt prognosis is Good.     Pt will continue to benefit from skilled outpatient physical therapy to address the deficits listed in the problem list box on initial evaluation, provide pt/family education and to maximize pt's level of independence in the home and community environment.     Pt's spiritual, cultural and educational needs considered and pt agreeable to plan of care and goals.     Anticipated barriers to physical therapy: chronicity of condition    Goals:   Short Term GOALS: 3 weeks. Pt agrees with goals set. PROGRESS 11/4/2024  1. Patient demonstrates compliance with HEP.   2. Patient demonstrates independence with Postural Awareness while sitting and standing.   3. Patient demonstrates decreased pain level of 3/10 while performing daily  activities.  4. Patient will improve Hamstring and pectoralis flexibility to mild restriction.     Long Term GOALS: 6 weeks. Pt agrees with goals set.  1. Patient demonstrates increased C/S AROM to improve tolerance to daily activities and turning head while driving.   2. Patient demonstrates increased core, hip and BLE strength by 1/2 grade or greater to improve tolerance to home chores.  3. Patient demonstrates increased periscapular muscle and Bilateral Upper Extremities strength by 1/2 grade or more to improve tolerance to work.  4. Patient demonstrates independence with body mechanics while working.  5. Patient demonstrates independence with HEP.   6. Patient will improve FOTO function score by 25% to show improvement in condition and functional mobility.     Plan     Continue PT as deemed per POC: spine mobility, core and trunk strengthening    Smiley Lima, PT

## 2024-11-06 ENCOUNTER — CLINICAL SUPPORT (OUTPATIENT)
Dept: REHABILITATION | Facility: HOSPITAL | Age: 34
End: 2024-11-06
Payer: COMMERCIAL

## 2024-11-06 DIAGNOSIS — R68.89 IMPAIRED TOLERANCE OF ACTIVITY: ICD-10-CM

## 2024-11-06 DIAGNOSIS — R29.3 POSTURE IMBALANCE: Primary | ICD-10-CM

## 2024-11-06 DIAGNOSIS — R19.8 ABDOMINAL WEAKNESS: ICD-10-CM

## 2024-11-06 PROCEDURE — 97112 NEUROMUSCULAR REEDUCATION: CPT | Mod: PN,CQ

## 2024-11-06 PROCEDURE — 97110 THERAPEUTIC EXERCISES: CPT | Mod: PN,CQ

## 2024-11-06 PROCEDURE — 97140 MANUAL THERAPY 1/> REGIONS: CPT | Mod: PN,CQ

## 2024-11-06 NOTE — PROGRESS NOTES
[facility]   Physical Therapy Treatment Note      Name: Alea Barrett  Clinic Number: [id number]    Therapy Diagnosis:   Encounter Diagnoses   Name Primary?    Posture imbalance Yes    Abdominal weakness     Impaired tolerance of activity        Physician: Estefania Benson NP    Visit Date: 11/6/2024    Physician Orders: PT Eval and Treat   Medical Diagnosis from Referral:   M54.2 (ICD-10-CM) - Cervicalgia   M54.16 (ICD-10-CM) - Lumbar radicular pain   Evaluation Date: 10/2/2024  Authorization Period Expiration: 12/31/2024  Plan of Care Expiration: 12/18/2024 or 12v post eval  Visit # (episodes)/ Visits authorized: 10 / eval +20 (POC 9 / 12 )  2nd FOTO visit 5 - 10/21/2024  3rd FOTO visit 10  Progress Note Due: 11/2/2024  PTA: 1/5     Time In: 4:05  Time Out: 5:00  Total Billable Time: 55 minutes     Precautions: Anxiety; Depression  Insurance: Payor: [employer] / Plan: [place] / Product Type: HMO /     Subjective     Pt reports: she is feeling much better in her left hip and not really having any pain.    She was compliant with home exercise program.  Response to previous treatment: did fine  Functional change: none reported    Pain: 2/10 neck; 2/10 back  Location: Occ ridge into skull and Upper Trapezius and midline; Lumbar into sacrum, radiates into glutes and thighs (>Left)     Objective      Psoas flexibility: Moderate restriction Bilateral     Treatment     Alea received the treatments listed below:      Alea received therapeutic exercises to develop strength, endurance, ROM, and flexibility for 15 minutes including:  NMRE utilized to activate appropriate mm to improve posture, core and lumbar stabilization and lumbopelvic stability: 30 minutes     STAND:  Red Theraband Bilateral Shoulder External Rotation, 59o0wet (hep) - NOT PERFORMED  Cable Column: Extension / Adduction / Tri Ext, 3#   Bicep curls, 3#  Green Theraband shoulder horizontal  "abduction  Blue tube shoulder External Rotation/Internal Rotation   Cable Column Core: Oblique paloff press 2 x 10 each, 7# / Retro walk x 10, 13# (17 next) / NEXT ADD chest press and   ADD lateral steps  Standing T/s rotation, 2x5 Bilateral - no spasm but reports left shoulder instability - HOLD    ADD Prone donkey kicks    TABLE:  Lumbar rotation, 10x Bilateral    TrA Ab brace, 18q3gga  TrA Ab brace with Bent Knee Fall Out, 2x10 Bilateral - Yellow Loop  Bridge, 2x10 Bilateral (hep) - Yellow Loop  Yellow Loop Sidelying clamshells, 2x10 Bilateral (hep)  Mod oblique crunch, opp hand taps opp knee, 2x10   Bilateral Sciatic Nerve glide, 20x Bilateral (hep)  Advanced Figure 4 piriformis stretch 9e55sqv each  Prone quad stretch, 4b66inn Bilateral  - not performed  Prone donkey kicks (monitor spasm)    Manual tx: 10 minutes  +Sidelying STM along bilateral lower lumbar paraspinals  +Sidelying rotational L4-5 mobs  NOT PERFORMED: Myofascial & TP Release to Left Quadratus Lumborum, glute and Iliotibial Band  NOT PERFORMED: Massage roller to Left glute and Iliotibial Band      NOT PERFORMED   Mirtha pose x 30" to center and x 30" with hands toward R for greater L flank stretch  CAROL stretch, 3d70tvh, started feeling spasm coming on after 2  HOLD Adam stretch, 3c32fqz - had muscles spasms after relieved by mirtha pose, 15 seconds next time HOLD    SEATED:  Seated Physioball: lumbar flexion / Alternate side to side, 10x Bilateral   Piriformis stretch, 2n89tiz Bilateral (hep)    NOT PERFORMED   T/s extension stretch, 15a6rap  Hamstring stretch, 7z95gpg Bilateral   Chin tucks, 10-68w2zun  C/S AROM: Flex/Ext / Rotation, 10x ea  Shoulder shrugs, 10x  Trap stretch, 6z21raj  Doorway stretch, W arms, 5r52lao  McK lumbar extension, 14x6psi      Therapeutic activities to improve functional performance for 00 minutes including:    Morning mobility routine: lumbar and cervical region Range of Motion in all planes      NOT PERFORMED "   Posture awareness with standing, turning, sitting to avoid being in excessive anterior or posterior pelvic tilts.   +Reiterated importance to core stabilization while lifting or carrying to reduce lumbar strain.      Add NEXT: pt prone to spasms and high sensitivity/fear avoidance  Self massage with Tennis ball  Cable Column     FUTURE:  Morning mobility routine  Prone T       Patient Education and Home Exercises       Education provided:   - daily HEP  - utilize heat in mornings; ice with flare-up; both 10-20 minute max / ice post tx if sore      Written Home Exercises Provided: Patient instructed to cont prior HEP. Exercises were reviewed and Alea was able to demonstrate them prior to the end of the session.  Alea demonstrated good  understanding of the education provided. See EMR under Patient Instructions for exercises provided during therapy sessions    Assessment     Pt presents to clinic with mild flare of lumbar symptoms L > R with tightness also to L > R piriformis. Point tender along L4-5 paraspinals specifically. Symptoms lessened some with gentle stretching and manual techniques. Improving body awareness and proprioception with functional stabilization exercises. Occasional cues required to maintain good body mechanics and posture alignment and Alea was able to self correct.  Continue to progress as tolerated.      Alea Is progressing well towards her goals.   Pt prognosis is Good.     Pt will continue to benefit from skilled outpatient physical therapy to address the deficits listed in the problem list box on initial evaluation, provide pt/family education and to maximize pt's level of independence in the home and community environment.     Pt's spiritual, cultural and educational needs considered and pt agreeable to plan of care and goals.     Anticipated barriers to physical therapy: chronicity of condition    Goals:   Short Term GOALS: 3 weeks. Pt agrees with goals set. PROGRESS  11/6/2024  1. Patient demonstrates compliance with HEP.   2. Patient demonstrates independence with Postural Awareness while sitting and standing.   3. Patient demonstrates decreased pain level of 3/10 while performing daily activities.  4. Patient will improve Hamstring and pectoralis flexibility to mild restriction.     Long Term GOALS: 6 weeks. Pt agrees with goals set.  1. Patient demonstrates increased C/S AROM to improve tolerance to daily activities and turning head while driving.   2. Patient demonstrates increased core, hip and BLE strength by 1/2 grade or greater to improve tolerance to home chores.  3. Patient demonstrates increased periscapular muscle and Bilateral Upper Extremities strength by 1/2 grade or more to improve tolerance to work.  4. Patient demonstrates independence with body mechanics while working.  5. Patient demonstrates independence with HEP.   6. Patient will improve FOTO function score by 25% to show improvement in condition and functional mobility.     Plan     Continue PT as deemed per POC: spine mobility, core and trunk strengthening    Estefany Perales, PTA

## 2024-11-06 NOTE — PROGRESS NOTES
[facility]   Physical Therapy Treatment Note      Name: Alea Barrett  Clinic Number: [id number]    Therapy Diagnosis:   Encounter Diagnoses   Name Primary?    Posture imbalance Yes    Abdominal weakness     Impaired tolerance of activity        Physician: Estefania Benson NP    Visit Date: 11/6/2024    Physician Orders: PT Eval and Treat   Medical Diagnosis from Referral:   M54.2 (ICD-10-CM) - Cervicalgia   M54.16 (ICD-10-CM) - Lumbar radicular pain   Evaluation Date: 10/2/2024  Authorization Period Expiration: 12/31/2024  Plan of Care Expiration: 12/18/2024 or 12v post eval  Visit # (episodes)/ Visits authorized: 11 / eval +20 (POC 9 / 12 )  2nd FOTO visit 5 - 10/21/2024  3rd FOTO visit 10  Progress Note Due: 11/2/2024  PTA: 1/5     Time In:   Time Out:   Total Billable Time:  minutes     Precautions: Anxiety; Depression  Insurance: Payor: [employer] / Plan[place] / Product Type: HMO /     Subjective     Pt reports: she is feeling much better in her left hip and not really having any pain.    She was compliant with home exercise program.  Response to previous treatment: did fine  Functional change: none reported    Pain: 2/10 neck; 2/10 back  Location: Occ ridge into skull and Upper Trapezius and midline; Lumbar into sacrum, radiates into glutes and thighs (>Left)     Objective      Psoas flexibility: Moderate restriction Bilateral     Treatment     Alea received the treatments listed below:      Alea received therapeutic exercises to develop strength, endurance, ROM, and flexibility for 12 minutes including:  NMRE utilized to activate appropriate mm to improve posture, core and lumbar stabilization and lumbopelvic stability: 33 minutes     STAND:  Red Theraband Bilateral Shoulder External Rotation, 61e5wej (hep)  +Cable Column: Extension / Adduction / Tri Ext, 3#   +Bicep curls, 3#  +Green Theraband shoulder horizontal abduction  +Blue tube shoulder  "External Rotation/Internal Rotation   ADD Cable Column Core: Oblique paloff press, 7# / Retro walk, 13# (17 next) / ADD chest press and   ADD lateral steps  ADD prone donkey kicks  Standing T/s rotation, 2x5 Bilateral - no spasm but reports left shoulder instability - HOLD    ADD Prone donkey kicks    TABLE:  Lumbar rotation, 10x Bilateral    TrA Ab brace, 82y2fdk  TrA Ab brace with Bent Knee Fall Out, 2x10 Bilateral - Yellow Theraband  Bridge, 2x10 Bilateral (hep) - Yellow Theraband  Yellow Theraband Sidelying clamshells, 2x10 Bilateral (hep)  Mod oblique crunch, opp hand taps opp knee, 2x10   Bilateral Sciatic Nerve glide, 20x Bilateral (hep)  Advanced Figure 4 piriformis stretch 8m00mcf each  Prone quad stretch, 9u83bth Bilateral   Add Prone donkey kicks (monitor spasm)    Manual tx: 0 minutes  Myofascial & TP Release to Left Quadratus Lumborum, glute and Iliotibial Band  Massage roller to Left glute and Iliotibial Band      NOT PERFORMED   Mirtha pose x 30" to center and x 30" with hands toward R for greater L flank stretch  CAROL stretch, 7o63kbr, started feeling spasm coming on after 2  HOLD Adam stretch, 2p76bte - had muscles spasms after relieved by mirtha pose, 15 seconds next time HOLD    SEATED:  Seated Physioball: lumbar flexion / Alternate side to side, 10x Bilateral   Piriformis stretch, 0v22ltr Bilateral (hep)    NOT PERFORMED   T/s extension stretch, 77h5nsx  Hamstring stretch, 4m32rus Bilateral   Chin tucks, 10-18g1exf  C/S AROM: Flex/Ext / Rotation, 10x ea  Shoulder shrugs, 10x  Trap stretch, 6g46esw  Doorway stretch, W arms, 9a03otb  McK lumbar extension, 61s1szr      Therapeutic activities to improve functional performance for 10 minutes including:    Morning mobility routine: lumbar and cervical region Range of Motion in all planes      NOT PERFORMED   Posture awareness with standing, turning, sitting to avoid being in excessive anterior or posterior pelvic tilts.   +Reiterated importance to " core stabilization while lifting or carrying to reduce lumbar strain.      Add NEXT: pt prone to spasms and high sensitivity/fear avoidance  Self massage with Tennis ball  Cable Column     FUTURE:  Morning mobility routine  Prone T       Patient Education and Home Exercises       Education provided:   - daily HEP  - utilize heat in mornings; ice with flare-up; both 10-20 minute max / ice post tx if sore      Written Home Exercises Provided: Patient instructed to cont prior HEP. Exercises were reviewed and Alea was able to demonstrate them prior to the end of the session.  Alea demonstrated good  understanding of the education provided. See EMR under Patient Instructions for exercises provided during therapy sessions    Assessment     Pt demonstrates low levels of pain today, decreased cervical Range of Motion, core, trunk and hip weakness, and balance and gait deficits. Pt able to perform all therex given with minimal pn provocation. Added more shoulder stabilization to reduce left shoulder instability that is possibly pulling her left lateral chain off center. Continue to progress as tolerated.      Alea Is progressing well towards her goals.   Pt prognosis is Good.     Pt will continue to benefit from skilled outpatient physical therapy to address the deficits listed in the problem list box on initial evaluation, provide pt/family education and to maximize pt's level of independence in the home and community environment.     Pt's spiritual, cultural and educational needs considered and pt agreeable to plan of care and goals.     Anticipated barriers to physical therapy: chronicity of condition    Goals:   Short Term GOALS: 3 weeks. Pt agrees with goals set. PROGRESS 11/6/2024  1. Patient demonstrates compliance with HEP.   2. Patient demonstrates independence with Postural Awareness while sitting and standing.   3. Patient demonstrates decreased pain level of 3/10 while performing daily activities.  4.  Patient will improve Hamstring and pectoralis flexibility to mild restriction.     Long Term GOALS: 6 weeks. Pt agrees with goals set.  1. Patient demonstrates increased C/S AROM to improve tolerance to daily activities and turning head while driving.   2. Patient demonstrates increased core, hip and BLE strength by 1/2 grade or greater to improve tolerance to home chores.  3. Patient demonstrates increased periscapular muscle and Bilateral Upper Extremities strength by 1/2 grade or more to improve tolerance to work.  4. Patient demonstrates independence with body mechanics while working.  5. Patient demonstrates independence with HEP.   6. Patient will improve FOTO function score by 25% to show improvement in condition and functional mobility.     Plan     Continue PT as deemed per POC: spine mobility, core and trunk strengthening    Minerva Martinez, PT

## 2024-11-11 ENCOUNTER — CLINICAL SUPPORT (OUTPATIENT)
Dept: REHABILITATION | Facility: HOSPITAL | Age: 34
End: 2024-11-11
Payer: COMMERCIAL

## 2024-11-11 DIAGNOSIS — R68.89 IMPAIRED TOLERANCE OF ACTIVITY: ICD-10-CM

## 2024-11-11 DIAGNOSIS — R19.8 ABDOMINAL WEAKNESS: ICD-10-CM

## 2024-11-11 DIAGNOSIS — R29.3 POSTURE IMBALANCE: Primary | ICD-10-CM

## 2024-11-11 PROCEDURE — 97112 NEUROMUSCULAR REEDUCATION: CPT | Mod: PN

## 2024-11-11 PROCEDURE — 97110 THERAPEUTIC EXERCISES: CPT | Mod: PN

## 2024-11-11 NOTE — PROGRESS NOTES
[facility]   Physical Therapy Treatment Note      Name: Alea Barrett  Clinic Number: [id number]    Therapy Diagnosis:   Encounter Diagnoses   Name Primary?    Posture imbalance Yes    Abdominal weakness     Impaired tolerance of activity        Physician: Estefania Benson NP    Visit Date: 11/11/2024    Physician Orders: PT Eval and Treat   Medical Diagnosis from Referral:   M54.2 (ICD-10-CM) - Cervicalgia   M54.16 (ICD-10-CM) - Lumbar radicular pain   Evaluation Date: 10/2/2024  Authorization Period Expiration: 12/31/2024  Plan of Care Expiration: 12/18/2024 or 12v post eval  Visit # (episodes)/ Visits authorized: 12 / eval +20 (POC 11 / 12 )  2nd FOTO visit 5 - 10/21/2024  3rd FOTO visit 10  Progress Note Due: 11/2/2024  PTA: 1/5     Time In: 4:05  Time Out: 5:00  Total Billable Time: 55 minutes     Precautions: Anxiety; Depression  Insurance: Payor: [employer] / Plan: [employer] [place] HMO / Product Type: HMO /     Subjective     Pt reports: her back is hurting from picking up kids, chasing them, sitting on floor.    She was compliant with home exercise program.  Response to previous treatment: did fine  Functional change: none reported    Pain: 2/10 neck; 5/10 back  Location: Occ ridge into skull and Upper Trapezius and midline; Lumbar into sacrum, radiates into glutes and thighs (>Left)     Objective      Psoas flexibility: Moderate restriction Bilateral     Treatment     Alea received the treatments listed below:      Alea received therapeutic exercises to develop strength, endurance, ROM, and flexibility for 28 minutes including:  NMRE utilized to activate appropriate mm to improve posture, core and lumbar stabilization and lumbopelvic stability: 27 minutes     STAND: + innominate correction and add hip flexor stretch if needed  Red Theraband Bilateral Shoulder External Rotation, [id number] (hep)   Cable Column: Extension / Adduction / Tri Ext, 3#   Bicep curls,  "3#  Green Theraband shoulder horizontal abduction  Blue tube shoulder External Rotation/Internal Rotation   Cable Column Core: Oblique paloff press 2 x 10 each, 7# / Retro walk x 10, 13# (17 next) / NEXT ADD chest press and   ADD lateral steps  Standing T/s rotation, 2x5 Bilateral - no spasm but reports left shoulder instability - HOLD    TABLE:  Lumbar rotation, 10x Bilateral    TrA Ab brace, 64y3tem  TrA Ab brace with Bent Knee Fall Out, 2x10 Bilateral - Yellow Loop  Bridge, 2x10 Bilateral (hep) - Yellow Loop  Yellow Loop Sidelying clamshells, 2x10 Bilateral (hep)  Mod oblique crunch, opp hand taps opp knee, 2x10   Morning mobility  Bilateral Sciatic Nerve glide, 20x Bilateral (hep)  Advanced Figure 4 piriformis stretch 2o31son each  Prone quad stretch, 9f05mph Bilateral    Prone donkey kicks, 77f5sws  Cat Cow  Mirtha pose    +MET for Left SLP: Left Hip Flexion / Right Hip Extension, 5x10 sec    +Left standing Quadratus Lumborum stretch    Lateral glides (Left   Hula hoops      Manual tx: 0 minutes NOT PERFORMED   +Sidelying STM along bilateral lower lumbar paraspinals  +Sidelying rotational L4-5 mobs  NOT PERFORMED: Myofascial & TP Release to Left Quadratus Lumborum, glute and Iliotibial Band  NOT PERFORMED: Massage roller to Left glute and Iliotibial Band      NOT PERFORMED   Mritha pose x 30" to center and x 30" with hands toward R for greater L flank stretch  CAROL stretch, 3z58scc, started feeling spasm coming on after 2  HOLD Adam stretch, 3j00zhf - had muscles spasms after relieved by mirtha pose, 15 seconds next time HOLD    SEATED:  Seated Physioball: lumbar flexion / Alternate side to side, 10x Bilateral   Piriformis stretch, 9b03ycd Bilateral (hep)    NOT PERFORMED   T/s extension stretch, 09o7fed  Hamstring stretch, 6u17mhz Bilateral   Chin tucks, 10-42q7fkj  C/S AROM: Flex/Ext / Rotation, 10x ea  Shoulder shrugs, 10x  Trap stretch, 2c68rxi  Doorway stretch, W arms, 0c38cks  McK lumbar extension, " 51e9cor      Therapeutic activities to improve functional performance for 00 minutes including:    Morning mobility routine: lumbar and cervical region Range of Motion in all planes      NOT PERFORMED   Posture awareness with standing, turning, sitting to avoid being in excessive anterior or posterior pelvic tilts.   +Reiterated importance to core stabilization while lifting or carrying to reduce lumbar strain.      Add NEXT: pt prone to spasms and high sensitivity/fear avoidance  Self massage with Tennis ball  Cable Column     FUTURE:  Morning mobility routine  Prone T       Patient Education and Home Exercises       Education provided:   - daily HEP  - utilize heat in mornings; ice with flare-up; both 10-20 minute max / ice post tx if sore      Written Home Exercises Provided: Patient instructed to cont prior HEP. Exercises were reviewed and Alea was able to demonstrate them prior to the end of the session.  Alea demonstrated good  understanding of the education provided. See EMR under Patient Instructions for exercises provided during therapy sessions    Assessment     Pt presents to clinic with mod flare of lumbar symptoms L > R. Added more mobility and stretching which does not seem to lower pain levels; more improvement with stabilization protocol. Did give pt protocol for morning mobility to increase synovial fluid production first thing in morning. Continue to progress as tolerated.      Alea Is progressing well towards her goals.   Pt prognosis is Good.     Pt will continue to benefit from skilled outpatient physical therapy to address the deficits listed in the problem list box on initial evaluation, provide pt/family education and to maximize pt's level of independence in the home and community environment.     Pt's spiritual, cultural and educational needs considered and pt agreeable to plan of care and goals.     Anticipated barriers to physical therapy: chronicity of condition    Goals:   Short  Term GOALS: 3 weeks. Pt agrees with goals set. PROGRESS 11/11/2024  1. Patient demonstrates compliance with HEP.   2. Patient demonstrates independence with Postural Awareness while sitting and standing.   3. Patient demonstrates decreased pain level of 3/10 while performing daily activities.  4. Patient will improve Hamstring and pectoralis flexibility to mild restriction.     Long Term GOALS: 6 weeks. Pt agrees with goals set.  1. Patient demonstrates increased C/S AROM to improve tolerance to daily activities and turning head while driving.   2. Patient demonstrates increased core, hip and BLE strength by 1/2 grade or greater to improve tolerance to home chores.  3. Patient demonstrates increased periscapular muscle and Bilateral Upper Extremities strength by 1/2 grade or more to improve tolerance to work.  4. Patient demonstrates independence with body mechanics while working.  5. Patient demonstrates independence with HEP.   6. Patient will improve FOTO function score by 25% to show improvement in condition and functional mobility.     Plan     Continue PT as deemed per POC: spine mobility, core and trunk strengthening    Smiley Lima, PT

## 2024-11-13 ENCOUNTER — CLINICAL SUPPORT (OUTPATIENT)
Dept: REHABILITATION | Facility: HOSPITAL | Age: 34
End: 2024-11-13
Payer: COMMERCIAL

## 2024-11-13 DIAGNOSIS — R29.3 POSTURE IMBALANCE: Primary | ICD-10-CM

## 2024-11-13 DIAGNOSIS — R19.8 ABDOMINAL WEAKNESS: ICD-10-CM

## 2024-11-13 DIAGNOSIS — R68.89 IMPAIRED TOLERANCE OF ACTIVITY: ICD-10-CM

## 2024-11-13 PROCEDURE — 97112 NEUROMUSCULAR REEDUCATION: CPT | Mod: PN

## 2024-11-13 PROCEDURE — 97530 THERAPEUTIC ACTIVITIES: CPT | Mod: PN

## 2024-11-13 PROCEDURE — 97012 MECHANICAL TRACTION THERAPY: CPT | Mod: PN

## 2024-11-13 NOTE — PROGRESS NOTES
[facility]   Physical Therapy Treatment Note      Name: Alea Barrett  Clinic Number: [id number]    Therapy Diagnosis:   Encounter Diagnoses   Name Primary?    Posture imbalance Yes    Abdominal weakness     Impaired tolerance of activity        Physician: Estefania Benson NP    Visit Date: 11/13/2024    Physician Orders: PT Eval and Treat   Medical Diagnosis from Referral:   M54.2 (ICD-10-CM) - Cervicalgia   M54.16 (ICD-10-CM) - Lumbar radicular pain   Evaluation Date: 10/2/2024  Authorization Period Expiration: 12/31/2024  Plan of Care Expiration: 2/5/2025 or 20v post eval  Visit # (episodes)/ Visits authorized: 13 / eval +20 (POC 12 / 20 )  2nd FOTO visit 5 - 10/21/2024  3rd FOTO visit 10  Progress Note Due: 11/2/2024  PTA: 1/5     Time In: 500  Time Out: 600  Total Billable Time: 60 minutes     Precautions: Anxiety; Depression  Insurance: Payor: [employer] / Plan: [place] / Product Type: HMO /     Subjective     Pt reports: she is functioning at 60% normal function. Previously, was functioning at 45%.  Feels PT has helped her with her neck. She feels like her back has not been getting better that much b/c all the pain returns when it comes to work. Feels like she would have to take off work to get results. Barely sleeping, eating, work stress. Lives with cousin and kids. Seeing a counselor to help with stress and relaxation. 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 Discussed returning to [place] in 2025 for more imaging and referral to Healthy Back program at the [facility] if no improvement in this second round with traction.     She was compliant with home exercise program.  Response to previous treatment: did fine  Functional change: none reported    Pain: 2-5/10 neck; 3-7/10 back  Location: Occ ridge into skull and  Upper Trapezius and midline; Lumbar into sacrum, radiates into glutes and thighs (>Left)     Objective      Therapeutic Activities: Updated Measurements, 15 minutes  See Updated Measurements in treatment section, 11/13/2024     Treatment     Alea received the treatments listed below:      +Mechanical lumbar traction: 60# / 30# / 30 / 10 / 10 minute start    Alea received therapeutic exercises to develop strength, endurance, ROM, and flexibility for 0 minutes including:  NMRE utilized to activate appropriate mm to improve posture, core and lumbar stabilization and lumbopelvic stability: 30 minutes     innominate correction and add hip flexor stretch if able (has had spasms with psoas stretches)  MET for Left SLP: Left Hip Flexion / Right Hip Extension, 5x10 sec  Straight Leg Raise, 2  Supine Shoulder horiz abduction  Red Theraband Clamshells  Prone donkey kicks  Seated Tibial internal rotation with Long Arc Quad       STAND: NOT PERFORMED   Red Theraband Bilateral Shoulder External Rotation, 35f5ptr (hep)   Cable Column: Extension / Adduction / Tri Ext, 3#   Bicep curls, 3#  Green Theraband shoulder horizontal abduction  Blue tube shoulder External Rotation/Internal Rotation   Cable Column Core: Oblique paloff press 2 x 10 each, 7# / Retro walk x 10, 13# (17 next) / NEXT ADD chest press and Chops-lift  ADD lateral steps  Standing T/s rotation, 2x5 Bilateral - no spasm but reports left shoulder instability - HOLD    TABLE: NOT PERFORMED   Lumbar rotation, 10x Bilateral    TrA Ab brace, 57g3nyd  TrA Ab brace with Bent Knee Fall Out, 2x10 Bilateral - Yellow Loop  Bridge, 2x10 Bilateral (hep) - Yellow Loop  Yellow Loop Sidelying clamshells, 2x10 Bilateral (hep)  Mod oblique crunch, opp hand taps opp knee, 2x10   Morning mobility  Bilateral Sciatic Nerve glide, 20x Bilateral (hep)  Advanced Figure 4 piriformis stretch 3u94spw each  Prone quad stretch, 2h86mnl Bilateral    Prone donkey kicks, 27c6aen  Mirtha  "pose      +Left standing Quadratus Lumborum stretch    Manual tx: 0 minutes NOT PERFORMED   +Sidelying STM along bilateral lower lumbar paraspinals  +Sidelying rotational L4-5 mobs  NOT PERFORMED: Myofascial & TP Release to Left Quadratus Lumborum, glute and Iliotibial Band  NOT PERFORMED: Massage roller to Left glute and Iliotibial Band      NOT PERFORMED   Rahul pose x 30" to center and x 30" with hands toward R for greater L flank stretch  CAROL stretch, 6t98dqn, started feeling spasm coming on after 2  HOLD Adam stretch, 9v65xbe - had muscles spasms after relieved by rahul pose, 15 seconds next time HOLD    SEATED:  Seated Physioball: lumbar flexion / Alternate side to side, 10x Bilateral   Piriformis stretch, 8m68kmi Bilateral (hep)    NOT PERFORMED   T/s extension stretch, 88m2ymw  Hamstring stretch, 0t18beu Bilateral   Chin tucks, 10-19u6mjo  C/S AROM: Flex/Ext / Rotation, 10x ea  Shoulder shrugs, 10x  Trap stretch, 6v81hxv  Doorway stretch, W arms, 4g87evj  McK lumbar extension, 98j3lnz      Therapeutic activities to improve functional performance for 00 minutes including:    Morning mobility routine: lumbar and cervical region Range of Motion in all planes      NOT PERFORMED   Posture awareness with standing, turning, sitting to avoid being in excessive anterior or posterior pelvic tilts.   +Reiterated importance to core stabilization while lifting or carrying to reduce lumbar strain.      Add NEXT: pt prone to spasms and high sensitivity/fear avoidance  Self massage with Tennis ball  Cable Column     FUTURE:  Morning mobility routine  Prone T       Patient Education and Home Exercises       Education provided:   - daily HEP  - utilize heat in mornings; ice with flare-up; both 10-20 minute max / ice post tx if sore      Written Home Exercises Provided: Patient instructed to cont prior HEP. Exercises were reviewed and Alea was able to demonstrate them prior to the end of the session.  Alea demonstrated " good  understanding of the education provided. See EMR under Patient Instructions for exercises provided during therapy sessions    Assessment     Pt has made significant clinical gains with her neck and UE Range of Motion and strength. She continues to have variable pain levels in her back and weakness in her core, hip and Bilateral Lower Extremities. Pt is trying to do morning mobility to warm up in mornings but it is 2:00 am when she does them; she is trying. Pt responds to core stabilization better overall. Utilized mechanical lumbar traction to decompress spine; pt noted decrease of pain. Continue to progress as tolerated.      Alea Is progressing well towards her goals.   Pt prognosis is Good.     Pt will continue to benefit from skilled outpatient physical therapy to address the deficits listed in the problem list box on initial evaluation, provide pt/family education and to maximize pt's level of independence in the home and community environment.     Pt's spiritual, cultural and educational needs considered and pt agreeable to plan of care and goals.     Anticipated barriers to physical therapy: chronicity of condition    Goals:   Short Term GOALS: 3 weeks. Pt agrees with goals set. PROGRESS 11/13/2024  1. Patient demonstrates compliance with HEP.   2. Patient demonstrates independence with Postural Awareness while sitting and standing.   3. Patient demonstrates decreased pain level of 3/10 while performing daily activities.  4. Patient will improve Hamstring and pectoralis flexibility to mild restriction.     Long Term GOALS: 6 weeks. Pt agrees with goals set. PROGRESS 11/13/2024  1. Patient demonstrates increased C/S AROM to improve tolerance to daily activities and turning head while driving. MET 11/12/24  2. Patient demonstrates increased core, hip and BLE strength by 1/2 grade or greater to improve tolerance to home chores.  3. Patient demonstrates increased periscapular muscle and Bilateral Upper  Extremities strength by 1/2 grade or more to improve tolerance to work.  4. Patient demonstrates independence with body mechanics while working.  5. Patient demonstrates independence with HEP.   6. Patient will improve FOTO function score by 25% to show improvement in condition and functional mobility.     10 weeks:  Continue to progress and meet previous goals    Plan     Continue PT as deemed per POC: spine mobility, core and trunk strengthening    Plan of care Certification: 111/13/2024 - 2/02/2025     Outpatient Physical Therapy 2 times weekly for 4 weeks to include the following interventions: Cervical/Lumbar Traction, Electrical Stimulation ,, Gait Training, Manual Therapy, Moist Heat/ Ice, Neuromuscular Re-ed, Orthotic Management and Training, Patient Education, Self Care, Therapeutic Activities, Therapeutic Exercise, and Ultrasound.     Smiley Lima, PT

## 2024-11-14 NOTE — PLAN OF CARE
[facility]   Physical Therapy Treatment Note & Updated Physical Therapy POC     Name: Alea Barrett  Clinic Number: [id number]    Therapy Diagnosis:   Encounter Diagnoses   Name Primary?    Posture imbalance Yes    Abdominal weakness     Impaired tolerance of activity        Physician: Estefania Benson NP    Visit Date: 11/13/2024    Physician Orders: PT Eval and Treat   Medical Diagnosis from Referral:   M54.2 (ICD-10-CM) - Cervicalgia   M54.16 (ICD-10-CM) - Lumbar radicular pain   Evaluation Date: 10/2/2024  Authorization Period Expiration: 12/31/2024  Plan of Care Expiration: 2/5/2025 or 20v post eval  Visit # (episodes)/ Visits authorized: 13 / eval +20 (POC 12 / 20 )  2nd FOTO visit 5 - 10/21/2024  3rd FOTO visit 10  Progress Note Due: 11/2/2024  PTA: 1/5     Time In: 500  Time Out: 600  Total Billable Time: 60 minutes     Precautions: Anxiety; Depression  Insurance: Payor: [employer] / Plan[place] HMO / Product Type: HMO /     Subjective     Pt reports: she is functioning at 60% normal function. Previously, was functioning at 45%.  Feels PT has helped her with her neck. She feels like her back has not been getting better that much b/c all the pain returns when it comes to work. Feels like she would have to take off work to get results. Barely sleeping, eating, work stress. Lives with cousin and kids. Seeing a counselor to help with stress and relaxation. 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 Discussed returning to [place] in 2025 for more imaging and referral to Healthy [facility] program at the [facility] if no improvement in this second round with traction.     She was compliant with home exercise program.  Response to previous treatment: did fine  Functional change: none reported    Pain: 2-5/10 neck; 3-7/10 back  Location:  Occ ridge into skull and Upper Trapezius and midline; Lumbar into sacrum, radiates into glutes and thighs (>Left)     Objective      Therapeutic Activities: Updated Measurements, 15 minutes    Range of Motion/Strength:         CERVICAL AROM  In degrees Pain/Dysfunction with Movement   Flexion (45 norm) 50     Extension (75 norm) 50->65     Right side bending  (35 norm) 45     Left side bending  (35 norm) 45     Right rotation  (65 norm) 55->65     Left rotation  (65 norm) 40->65        No new lumbar AROM taken since she has above normal Range of Motion   Lumbar AROM: ROM-   Response to repeated movements   Flexion 12 cm from floor - stretch   Extension (15 norm) 20 degrees    Right side bending  (20 norm) 25 degrees   Left side bending  (20 norm) 25 degrees   Rotation Observation WNL, felt start of spasm->same      UE MMT:  5/5 norm Left Right   Shoulder Flexion 5/5 5/5   Shoulder Extension 4/5->5 4/5->5   Shoulder Abduction 5/5 5/5   Serratus Anterior  4+/5 4+/5   Shoulder IR 4+/5 4+/5   Shoulder ER  @ 0* Abduction 4+/5 4+/5   Elbow Flexion  4+/5 4+/5   Elbow Extension 4+/5 4+/5   Mid Traps 3+/5 3+/5   Low Traps 3/5 3/5      LE MMT:  5/5 norm Left Right   Hip Flexion 4/5->same 4/5->same   Hip Extension 5/5 5/5   Hip Abduction 4/5 4/5   Hip Adduction 5/5 5/5   Hip IR 5/5 5/5   Hip ER 4/5->5 4/5->5   Knee Flexion 5/5 5/5   Knee Extension 4/5->same 4/5->same   Ankle DF 5/5 5/5   Ankle PF 5/5 5/5   Ankle Inversion 5/5 5/5   Ankle Eversion 5/5 5/5         See Updated FOTO score in Media section of EPIC         Treatment     Alea received the treatments listed below:      +Mechanical lumbar traction: 60# / 30# / 30 / 10 / 10 minute start    Alea received therapeutic exercises to develop strength, endurance, ROM, and flexibility for 0 minutes including:  NMRE utilized to activate appropriate mm to improve posture, core and lumbar stabilization and lumbopelvic stability: 30 minutes     innominate correction and add hip  "flexor stretch if able (has had spasms with psoas stretches)  MET for Left SLP: Left Hip Flexion / Right Hip Extension, 5x10 sec  Straight Leg Raise, 2  Supine Shoulder horiz abduction  Red Theraband Clamshells  Prone donkey kicks  Seated Tibial internal rotation with Long Arc Quad       STAND: NOT PERFORMED   Red Theraband Bilateral Shoulder External Rotation, 89x7uae (hep)   Cable Column: Extension / Adduction / Tri Ext, 3#   Bicep curls, 3#  Green Theraband shoulder horizontal abduction  Blue tube shoulder External Rotation/Internal Rotation   Cable Column Core: Oblique paloff press 2 x 10 each, 7# / Retro walk x 10, 13# (17 next) / NEXT ADD chest press and Chops-lift  ADD lateral steps  Standing T/s rotation, 2x5 Bilateral - no spasm but reports left shoulder instability - HOLD    TABLE: NOT PERFORMED   Lumbar rotation, 10x Bilateral    TrA Ab brace, 68a9bup  TrA Ab brace with Bent Knee Fall Out, 2x10 Bilateral - Yellow Loop  Bridge, 2x10 Bilateral (hep) - Yellow Loop  Yellow Loop Sidelying clamshells, 2x10 Bilateral (hep)  Mod oblique crunch, opp hand taps opp knee, 2x10   Morning mobility  Bilateral Sciatic Nerve glide, 20x Bilateral (hep)  Advanced Figure 4 piriformis stretch 4b19eug each  Prone quad stretch, 7i47htp Bilateral    Prone donkey kicks, 26a4tqp  Mirtha pose      +Left standing Quadratus Lumborum stretch    Manual tx: 0 minutes NOT PERFORMED   +Sidelying STM along bilateral lower lumbar paraspinals  +Sidelying rotational L4-5 mobs  NOT PERFORMED: Myofascial & TP Release to Left Quadratus Lumborum, glute and Iliotibial Band  NOT PERFORMED: Massage roller to Left glute and Iliotibial Band      NOT PERFORMED   Mirtha pose x 30" to center and x 30" with hands toward R for greater L flank stretch  CAROL stretch, 9v81gon, started feeling spasm coming on after 2  HOLD Adam stretch, 2g18jqu - had muscles spasms after relieved by mirtha pose, 15 seconds next time HOLD    SEATED:  Seated Physioball: lumbar " flexion / Alternate side to side, 10x Bilateral   Piriformis stretch, 9q33bqa Bilateral (hep)    NOT PERFORMED   T/s extension stretch, 57j2zsx  Hamstring stretch, 1o07our Bilateral   Chin tucks, 10-09l0hhm  C/S AROM: Flex/Ext / Rotation, 10x ea  Shoulder shrugs, 10x  Trap stretch, 6i52lub  Doorway stretch, W arms, 4t57tnp  McK lumbar extension, 64h7lta      Therapeutic activities to improve functional performance for 00 minutes including:    Morning mobility routine: lumbar and cervical region Range of Motion in all planes      NOT PERFORMED   Posture awareness with standing, turning, sitting to avoid being in excessive anterior or posterior pelvic tilts.   +Reiterated importance to core stabilization while lifting or carrying to reduce lumbar strain.      Add NEXT: pt prone to spasms and high sensitivity/fear avoidance  Self massage with Tennis ball  Cable Column     FUTURE:  Morning mobility routine  Prone T       Patient Education and Home Exercises       Education provided:   - daily HEP  - utilize heat in mornings; ice with flare-up; both 10-20 minute max / ice post tx if sore      Written Home Exercises Provided: Patient instructed to cont prior HEP. Exercises were reviewed and Alea was able to demonstrate them prior to the end of the session.  Alea demonstrated good  understanding of the education provided. See EMR under Patient Instructions for exercises provided during therapy sessions    Assessment     Pt has made significant clinical gains with her neck and UE Range of Motion and strength. She continues to have variable pain levels in her back and weakness in her core, hip and Bilateral Lower Extremities. Pt is trying to do morning mobility to warm up in mornings but it is 2:00 am when she does them; she is trying. Pt responds to core stabilization better overall. Utilized mechanical lumbar traction to decompress spine; pt noted decrease of pain. Continue to progress as tolerated.      Alea Is  progressing well towards her goals.   Pt prognosis is Good.     Pt will continue to benefit from skilled outpatient physical therapy to address the deficits listed in the problem list box on initial evaluation, provide pt/family education and to maximize pt's level of independence in the home and community environment.     Pt's spiritual, cultural and educational needs considered and pt agreeable to plan of care and goals.     Anticipated barriers to physical therapy: chronicity of condition    Goals:   Short Term GOALS: 3 weeks. Pt agrees with goals set. PROGRESS 11/13/2024  1. Patient demonstrates compliance with HEP.   2. Patient demonstrates independence with Postural Awareness while sitting and standing.   3. Patient demonstrates decreased pain level of 3/10 while performing daily activities.  4. Patient will improve Hamstring and pectoralis flexibility to mild restriction.     Long Term GOALS: 6 weeks. Pt agrees with goals set. PROGRESS 11/13/2024  1. Patient demonstrates increased C/S AROM to improve tolerance to daily activities and turning head while driving. MET 11/12/24  2. Patient demonstrates increased core, hip and BLE strength by 1/2 grade or greater to improve tolerance to home chores.  3. Patient demonstrates increased periscapular muscle and Bilateral Upper Extremities strength by 1/2 grade or more to improve tolerance to work.  4. Patient demonstrates independence with body mechanics while working.  5. Patient demonstrates independence with HEP.   6. Patient will improve FOTO function score by 25% to show improvement in condition and functional mobility.     10 weeks:  Continue to progress and meet previous goals    Plan     Continue PT as deemed per POC: spine mobility, core and trunk strengthening    Plan of care Certification: 111/13/2024 - 2/02/2025     Outpatient Physical Therapy 2 times weekly for 4 weeks to include the following interventions: Cervical/Lumbar Traction, Electrical  Stimulation ,, Gait Training, Manual Therapy, Moist Heat/ Ice, Neuromuscular Re-ed, Orthotic Management and Training, Patient Education, Self Care, Therapeutic Activities, Therapeutic Exercise, and Ultrasound.     Smiley Lima, PT

## 2024-11-20 ENCOUNTER — CLINICAL SUPPORT (OUTPATIENT)
Dept: REHABILITATION | Facility: HOSPITAL | Age: 34
End: 2024-11-20
Payer: COMMERCIAL

## 2024-11-20 DIAGNOSIS — R19.8 ABDOMINAL WEAKNESS: ICD-10-CM

## 2024-11-20 DIAGNOSIS — R29.3 POSTURE IMBALANCE: Primary | ICD-10-CM

## 2024-11-20 DIAGNOSIS — R68.89 IMPAIRED TOLERANCE OF ACTIVITY: ICD-10-CM

## 2024-11-20 PROCEDURE — 97012 MECHANICAL TRACTION THERAPY: CPT | Mod: PN

## 2024-11-20 PROCEDURE — 97112 NEUROMUSCULAR REEDUCATION: CPT | Mod: PN

## 2024-11-20 PROCEDURE — 97110 THERAPEUTIC EXERCISES: CPT | Mod: PN

## 2024-11-20 NOTE — PROGRESS NOTES
[facility]   Physical Therapy Treatment Note      Name: Alea Barrett  Clinic Number: [id number]    Therapy Diagnosis:   Encounter Diagnoses   Name Primary?    Posture imbalance Yes    Abdominal weakness     Impaired tolerance of activity        Physician: Estefania Benson NP    Visit Date: 11/20/2024    Physician Orders: PT Eval and Treat   Medical Diagnosis from Referral:   M54.2 (ICD-10-CM) - Cervicalgia   M54.16 (ICD-10-CM) - Lumbar radicular pain   Evaluation Date: 10/2/2024  Authorization Period Expiration: 12/31/2024  Plan of Care Expiration: 2/5/2025 or 20v post eval  Visit # (episodes)/ Visits authorized: 14 / eval +20 (POC 13 / 20 )  2nd FOTO visit 5 - 10/21/2024  3rd FOTO visit 10  Progress Note Due: 11/2/2024  PTA: 1/5     Time In: 500  Time Out: 600  Total Billable Time: 60 minutes     Precautions: Anxiety; Depression  Insurance: Payor: [employer] / [place] / Product Type: HMO /     Subjective     Pt reports: traction was helpful. Just got off of work.     She was compliant with home exercise program.  Response to previous treatment: did fine  Functional change: none reported    Pain: 2-5/10 neck; 4/10 back  Location: Occ ridge into skull and Upper Trapezius and midline; Lumbar into sacrum, radiates into glutes and thighs (>Left)     Objective      None taken today     Treatment     Alea received the treatments listed below:      +Mechanical lumbar traction: 60# / 30# / 30 / 10 / 15 minute    Alea received therapeutic exercises to develop strength, endurance, ROM, and flexibility for 10 minutes including:  NMRE utilized to activate appropriate mm to improve posture, core and lumbar stabilization and lumbopelvic stability: 30 minutes     TABLE:   Lumbar rotation, 10x Bilateral  MET for Left SLP: Left Hip Flexion / Right Hip Extension, 5x10 sec - NOT PERFORMED due to symmetrical  TrA Ab brace, 85x9wdo  TrA Ab brace with Bent Knee Fall Out,  2x10 Bilateral - Yellow Loop  Bridge, 2x10 Bilateral (hep) - Yellow Loop  Red Theraband Sidelying clamshells, 2x10 Bilateral (hep)  Straight Leg Raise, 2x10 alternate Bilateral   Adam stretch, 1t20fsa  Supine Green Theraband Shoulder horiz abduction  Prone donkey kicks, 2x10, 3 sec  Seated Tibial internal rotation with Long Arc Quad - NOT PERFORMED     NOT PERFORMED   Mod oblique crunch, opp hand taps opp knee, 2x10   Morning mobility  Bilateral Sciatic Nerve glide, 20x Bilateral (hep)  Advanced Figure 4 piriformis stretch 6c19doo each  Prone quad stretch, 7a25yqc Bilateral    Mirtha pose      STAND: NOT PERFORMED   Red Theraband Bilateral Shoulder External Rotation, 84f0ipv (hep)   Cable Column: Extension / Adduction / Tri Ext, 3#   Bicep curls, 3#  Green Theraband shoulder horizontal abduction  Blue tube shoulder External Rotation/Internal Rotation   Cable Column Core: Oblique paloff press 2 x 10 each, 7# / Retro walk x 10, 13# (17 next) / NEXT ADD chest press and Chops-lift  ADD lateral steps  Standing T/s rotation, 2x5 Bilateral - no spasm but reports left shoulder instability - HOLD    +Left standing Quadratus Lumborum stretch      Manual tx: 0 minutes NOT PERFORMED   +Sidelying STM along bilateral lower lumbar paraspinals  +Sidelying rotational L4-5 mobs  NOT PERFORMED: Myofascial & TP Release to Left Quadratus Lumborum, glute and Iliotibial Band  NOT PERFORMED: Massage roller to Left glute and Iliotibial Band      SEATED: NOT PERFORMED   Seated Physioball: lumbar flexion / Alternate side to side, 10x Bilateral   Piriformis stretch, 2u71xgi Bilateral (hep)  T/s extension stretch, 73l6woc  Hamstring stretch, 5s76stf Bilateral   Chin tucks, 10-19m5evm  C/S AROM: Flex/Ext / Rotation, 10x ea  Shoulder shrugs, 10x  Trap stretch, 1g69bnx  Doorway stretch, W arms, 9t79xoo  McK lumbar extension, 47g2fmv      Therapeutic activities to improve functional performance for 00 minutes including:    Morning mobility  routine: lumbar and cervical region Range of Motion in all planes   Posture awareness with standing, turning, sitting to avoid being in excessive anterior or posterior pelvic tilts.   +Reiterated importance to core stabilization while lifting or carrying to reduce lumbar strain.      Add NEXT: pt prone to spasms and high sensitivity/fear avoidance  Self massage with Tennis ball  Cable Column   FUTURE:       Patient Education and Home Exercises       Education provided:   - daily HEP  - utilize heat in mornings; ice with flare-up; both 10-20 minute max / ice post tx if sore      Written Home Exercises Provided: Patient instructed to cont prior HEP. Exercises were reviewed and Alea was able to demonstrate them prior to the end of the session.  Alea demonstrated good  understanding of the education provided. See EMR under Patient Instructions for exercises provided during therapy sessions    Assessment     Pt continues to have variable pain levels in her back and weakness in her core, hip and Bilateral Lower Extremities. Pt is trying to do morning mobility to warm up in mornings but it is 2:00 am when she does them; she is trying. Utilized mechanical lumbar traction to decompress spine; pt noted decrease of pain. Reviewed core stab and hip strengthening. Continue to progress as tolerated.      Alea Is progressing well towards her goals.   Pt prognosis is Good.     Pt will continue to benefit from skilled outpatient physical therapy to address the deficits listed in the problem list box on initial evaluation, provide pt/family education and to maximize pt's level of independence in the home and community environment.     Pt's spiritual, cultural and educational needs considered and pt agreeable to plan of care and goals.     Anticipated barriers to physical therapy: chronicity of condition    Goals:   Short Term GOALS: 3 weeks. Pt agrees with goals set. PROGRESS 11/20/2024  1. Patient demonstrates compliance with  HEP.   2. Patient demonstrates independence with Postural Awareness while sitting and standing.   3. Patient demonstrates decreased pain level of 3/10 while performing daily activities.  4. Patient will improve Hamstring and pectoralis flexibility to mild restriction.     Long Term GOALS: 6 weeks. Pt agrees with goals set. PROGRESS 11/20/2024  1. Patient demonstrates increased C/S AROM to improve tolerance to daily activities and turning head while driving. MET 11/12/24  2. Patient demonstrates increased core, hip and BLE strength by 1/2 grade or greater to improve tolerance to home chores.  3. Patient demonstrates increased periscapular muscle and Bilateral Upper Extremities strength by 1/2 grade or more to improve tolerance to work.  4. Patient demonstrates independence with body mechanics while working.  5. Patient demonstrates independence with HEP.   6. Patient will improve FOTO function score by 25% to show improvement in condition and functional mobility.     10 weeks:  Continue to progress and meet previous goals    Plan     Continue PT as deemed per POC: spine mobility, core and trunk strengthening    Plan of care Certification: 111/13/2024 - 2/02/2025     Outpatient Physical Therapy 2 times weekly for 4 weeks to include the following interventions: Cervical/Lumbar Traction, Electrical Stimulation ,, Gait Training, Manual Therapy, Moist Heat/ Ice, Neuromuscular Re-ed, Orthotic Management and Training, Patient Education, Self Care, Therapeutic Activities, Therapeutic Exercise, and Ultrasound.     Smiley Lima, PT

## 2024-12-04 ENCOUNTER — CLINICAL SUPPORT (OUTPATIENT)
Dept: REHABILITATION | Facility: HOSPITAL | Age: 34
End: 2024-12-04
Payer: COMMERCIAL

## 2024-12-04 DIAGNOSIS — R68.89 IMPAIRED TOLERANCE OF ACTIVITY: ICD-10-CM

## 2024-12-04 DIAGNOSIS — R19.8 ABDOMINAL WEAKNESS: ICD-10-CM

## 2024-12-04 DIAGNOSIS — R29.3 POSTURE IMBALANCE: Primary | ICD-10-CM

## 2024-12-04 PROCEDURE — 97012 MECHANICAL TRACTION THERAPY: CPT | Mod: PN

## 2024-12-04 PROCEDURE — 97110 THERAPEUTIC EXERCISES: CPT | Mod: PN

## 2024-12-04 PROCEDURE — 97112 NEUROMUSCULAR REEDUCATION: CPT | Mod: PN

## 2024-12-04 NOTE — PROGRESS NOTES
[facility]   Physical Therapy Treatment Note      Name: Alea Barrett  Clinic Number: [id number]    Therapy Diagnosis:   Encounter Diagnoses   Name Primary?    Posture imbalance Yes    Abdominal weakness     Impaired tolerance of activity        Physician: Estefania Benson NP    Visit Date: 12/4/2024    Physician Orders: PT Eval and Treat   Medical Diagnosis from Referral:   M54.2 (ICD-10-CM) - Cervicalgia   M54.16 (ICD-10-CM) - Lumbar radicular pain   Evaluation Date: 10/2/2024  Authorization Period Expiration: 12/31/2024  Plan of Care Expiration: 2/5/2025 or 20v post eval  Visit # (episodes)/ Visits authorized: 15 / eval +20 (POC 14 / 20 )  2nd FOTO visit 5 - 10/21/2024  3rd FOTO visit 10  Progress Note Due: 11/2/2024  PTA: 1/5     Time In: 500  Time Out: 555  Total Billable Time: 55 minutes     Precautions: Anxiety; Depression  Insurance: Payor: [employer] / Plan: [place] HMO / Product Type: HMO /     Subjective     Pt reports: traction was helpful. Had to miss last week b/c of increase in work.     She was compliant with home exercise program.  Response to previous treatment: did fine  Functional change: none reported    Pain: 2-5/10 neck; 4/10 back  Location: Occ ridge into skull and Upper Trapezius and midline; Lumbar into sacrum, radiates into glutes and thighs (>Left)     Objective      None taken today     Treatment     Alea received the treatments listed below:      Mechanical lumbar traction: 65# / 35# / 30 / 10 / 15 minute    Alea received therapeutic exercises to develop strength, endurance, ROM, and flexibility for 10 minutes including:  NMRE utilized to activate appropriate mm to improve posture, core and lumbar stabilization and lumbopelvic stability: 30 minutes     TABLE:   Lumbar rotation, 10x Bilateral  MET for Left SLP: Left Hip Flexion / Right Hip Extension, 5x10 sec - NOT PERFORMED due to symmetrical  TrA Ab brace, [id number]  TrA Ab brace  with Bent Knee Fall Out, 2x10 Bilateral - Yellow Loop  Bridge, 2x10 Bilateral (hep) - Yellow Loop  Red Theraband Sidelying clamshells, 2x10 Bilateral (hep)  Straight Leg Raise, 2x10 alternate Bilateral   Adam stretch, 8e15ixl  Supine Green Theraband Shoulder horiz abduction  Prone donkey kicks, 2x10, 3 sec  Seated Tibial internal rotation with Long Arc Quad - NOT PERFORMED     NOT PERFORMED   Mod oblique crunch, opp hand taps opp knee, 2x10   Morning mobility  Bilateral Sciatic Nerve glide, 20x Bilateral (hep)  Advanced Figure 4 piriformis stretch 0s18jpo each  Prone quad stretch, 2e79aaw Bilateral    Mirtha pose      STAND:   Red Theraband Bilateral Shoulder External Rotation, 20y2ert (hep)   Cable Column: Extension / Adduction / Tri Ext, 3#, 20-30x  Cable Column Core: Oblique paloff press 2 x 10 each, 7# / Retro walk x 10, 17# (20 next) / +chest press, 2x10, 7# / +Chops-lift, 10x ea, 3#  Bicep curls, 5#, 20-30x  +Left standing Quadratus Lumborum stretch, 5x10 sec  Piriformis stretch, 4v26vtf Bilateral (hep)    Green Theraband shoulder horizontal abduction  Blue tube shoulder External Rotation/Internal Rotation   ADD lateral steps  Standing T/s rotation, 2x5 Bilateral - no spasm but reports left shoulder instability - HOLD      Manual tx: 0 minutes NOT PERFORMED   +Sidelying STM along bilateral lower lumbar paraspinals  +Sidelying rotational L4-5 mobs  NOT PERFORMED: Myofascial & TP Release to Left Quadratus Lumborum, glute and Iliotibial Band  NOT PERFORMED: Massage roller to Left glute and Iliotibial Band      SEATED: NOT PERFORMED   Seated Physioball: lumbar flexion / Alternate side to side, 10x Bilateral   T/s extension stretch, 77w7eul  Hamstring stretch, 7w53ytb Bilateral   Chin tucks, 10-80s8apl  C/S AROM: Flex/Ext / Rotation, 10x ea  Shoulder shrugs, 10x  Trap stretch, 7k02les  Doorway stretch, W arms, 5m00zzf  McK lumbar extension, 65g2afi      Therapeutic activities to improve functional performance for  00 minutes including:    Morning mobility routine: lumbar and cervical region Range of Motion in all planes   Posture awareness with standing, turning, sitting to avoid being in excessive anterior or posterior pelvic tilts.   +Reiterated importance to core stabilization while lifting or carrying to reduce lumbar strain.      Add NEXT: pt prone to spasms and high sensitivity/fear avoidance  Self massage with Tennis ball  Cable Column   FUTURE:       Patient Education and Home Exercises       Education provided:   - daily HEP  - utilize heat in mornings; ice with flare-up; both 10-20 minute max / ice post tx if sore      Written Home Exercises Provided: Patient instructed to cont prior HEP. Exercises were reviewed and Alea was able to demonstrate them prior to the end of the session.  Alea demonstrated good  understanding of the education provided. See EMR under Patient Instructions for exercises provided during therapy sessions    Assessment     Pt continues to have variable pain levels in her back and weakness in her core, hip and Bilateral Lower Extremities. Utilized mechanical lumbar traction with a higher force to decompress spine; pt noted decrease of pain. Focused on core strengthening with the Cable Column today. Continue to progress as tolerated.      Alea Is progressing well towards her goals.   Pt prognosis is Good.     Pt will continue to benefit from skilled outpatient physical therapy to address the deficits listed in the problem list box on initial evaluation, provide pt/family education and to maximize pt's level of independence in the home and community environment.     Pt's spiritual, cultural and educational needs considered and pt agreeable to plan of care and goals.     Anticipated barriers to physical therapy: chronicity of condition    Goals:   Short Term GOALS: 3 weeks. Pt agrees with goals set. PROGRESS 12/4/2024  1. Patient demonstrates compliance with HEP.   2. Patient demonstrates  independence with Postural Awareness while sitting and standing.   3. Patient demonstrates decreased pain level of 3/10 while performing daily activities.  4. Patient will improve Hamstring and pectoralis flexibility to mild restriction.     Long Term GOALS: 6 weeks. Pt agrees with goals set. PROGRESS 12/4/2024  1. Patient demonstrates increased C/S AROM to improve tolerance to daily activities and turning head while driving. MET 11/12/24  2. Patient demonstrates increased core, hip and BLE strength by 1/2 grade or greater to improve tolerance to home chores.  3. Patient demonstrates increased periscapular muscle and Bilateral Upper Extremities strength by 1/2 grade or more to improve tolerance to work.  4. Patient demonstrates independence with body mechanics while working.  5. Patient demonstrates independence with HEP.   6. Patient will improve FOTO function score by 25% to show improvement in condition and functional mobility.     10 weeks:  Continue to progress and meet previous goals    Plan     Continue PT as deemed per POC: spine mobility, core and trunk strengthening    Plan of care Certification: 111/13/2024 - 2/02/2025     Outpatient Physical Therapy 2 times weekly for 4 weeks to include the following interventions: Cervical/Lumbar Traction, Electrical Stimulation ,, Gait Training, Manual Therapy, Moist Heat/ Ice, Neuromuscular Re-ed, Orthotic Management and Training, Patient Education, Self Care, Therapeutic Activities, Therapeutic Exercise, and Ultrasound.     Smiley Lima, PT

## 2025-01-02 ENCOUNTER — OFFICE VISIT (OUTPATIENT)
Dept: FAMILY MEDICINE | Facility: CLINIC | Age: 35
End: 2025-01-02
Payer: COMMERCIAL

## 2025-01-02 ENCOUNTER — CLINICAL SUPPORT (OUTPATIENT)
Dept: REHABILITATION | Facility: HOSPITAL | Age: 35
End: 2025-01-02
Payer: COMMERCIAL

## 2025-01-02 VITALS
OXYGEN SATURATION: 99 % | BODY MASS INDEX: 30.38 KG/M2 | HEART RATE: 91 BPM | TEMPERATURE: 98 F | SYSTOLIC BLOOD PRESSURE: 129 MMHG | DIASTOLIC BLOOD PRESSURE: 82 MMHG | WEIGHT: 177 LBS

## 2025-01-02 DIAGNOSIS — R29.3 POSTURE IMBALANCE: Primary | ICD-10-CM

## 2025-01-02 DIAGNOSIS — R19.8 ABDOMINAL WEAKNESS: ICD-10-CM

## 2025-01-02 DIAGNOSIS — R68.89 IMPAIRED TOLERANCE OF ACTIVITY: ICD-10-CM

## 2025-01-02 DIAGNOSIS — M48.07 SPINAL STENOSIS, LUMBOSACRAL REGION: Primary | ICD-10-CM

## 2025-01-02 PROCEDURE — 1160F RVW MEDS BY RX/DR IN RCRD: CPT | Mod: CPTII,S$GLB,,

## 2025-01-02 PROCEDURE — 3074F SYST BP LT 130 MM HG: CPT | Mod: CPTII,S$GLB,,

## 2025-01-02 PROCEDURE — 1159F MED LIST DOCD IN RCRD: CPT | Mod: CPTII,S$GLB,,

## 2025-01-02 PROCEDURE — 97112 NEUROMUSCULAR REEDUCATION: CPT | Mod: PN

## 2025-01-02 PROCEDURE — 97110 THERAPEUTIC EXERCISES: CPT | Mod: PN

## 2025-01-02 PROCEDURE — 99214 OFFICE O/P EST MOD 30 MIN: CPT | Mod: S$GLB,,,

## 2025-01-02 PROCEDURE — 99999 PR PBB SHADOW E&M-EST. PATIENT-LVL IV: CPT | Mod: PBBFAC,,,

## 2025-01-02 PROCEDURE — 3008F BODY MASS INDEX DOCD: CPT | Mod: CPTII,S$GLB,,

## 2025-01-02 PROCEDURE — 3079F DIAST BP 80-89 MM HG: CPT | Mod: CPTII,S$GLB,,

## 2025-01-02 RX ORDER — GABAPENTIN 100 MG/1
100 CAPSULE ORAL 2 TIMES DAILY PRN
Qty: 60 CAPSULE | Refills: 1 | Status: SHIPPED | OUTPATIENT
Start: 2025-01-02

## 2025-01-02 NOTE — PROGRESS NOTES
[facility]   Physical Therapy Treatment Note      Name: Alea Barrett  Clinic Number: [id number]    Therapy Diagnosis:   Encounter Diagnoses   Name Primary?    Posture imbalance Yes    Abdominal weakness     Impaired tolerance of activity        Physician: Estefania Benson NP    Visit Date: 1/2/2025    Physician Orders: PT Eval and Treat   Medical Diagnosis from Referral:   M54.2 (ICD-10-CM) - Cervicalgia   M54.16 (ICD-10-CM) - Lumbar radicular pain   Evaluation Date: 10/2/2024  Authorization Period Expiration: 12/31/2024  Plan of Care Expiration: 2/5/2025 or 20v post eval  Visit # (episodes)/ Visits authorized: 16 / eval +20 (POC 15 / 20 )  2nd FOTO visit 5 - 10/21/2024  3rd FOTO visit 10 - 11/13/24  Progress Note Due: 11/2/2024  PTA: 1/5     Time In: 100  Time Out: 155  Total Billable Time: 55 minutes     Precautions: Anxiety; Depression  Insurance: Payor: [employer] / Plan: [place] / Product Type: HMO /     Subjective     Pt reports: she has been working longer hours. Going from midnight to 3pm. Has not been able to do PT for the last month in clinic or at home and feels she has gotten progressively worse. Has appt today with referring provider. Still taking muscles relaxer. Discussing with counselor about change of occupation.     She was compliant with home exercise program.  Response to previous treatment: did fine  Functional change: none reported    Pain: 2-5/10 neck; 6.5-7/10 back (across lumbosacral region)  Location: Occ ridge into skull and Upper Trapezius and midline; Lumbar into sacrum, radiates into glutes and thighs (>Left)     Objective      None taken today    Last Measurements taken 11/14/2024     Treatment     Alea received the treatments listed below:      Mechanical lumbar traction: 65# / 35# / 30 / 10 / 15 minute - NOT PERFORMED     Alea received therapeutic exercises to develop strength, endurance, ROM, and flexibility for 10 minutes  including:  NMRE utilized to activate appropriate mm to improve posture, core and lumbar stabilization and lumbopelvic stability: 45 minutes     TABLE:   Lumbar rotation, 10x Bilateral  +SKTC, 75e23rpl hold  MET for Left SLP: Left Hip Flexion / Right Hip Extension, 5x10 sec - NOT PERFORMED due to symmetrical  TrA Ab brace, 09f3oox  TrA Ab brace with Yellow Theraband Bent Knee Fall Out, 2x10 Bilateral   Yellow Theraband Bridge, 2x10 Bilateral (hep)   Red Theraband Sidelying clamshells, 2x10 Bilateral (hep) - yellow today  Straight Leg Raise, x10 alternate Bilateral   Bilateral Sciatic Nerve glide, 30x Bilateral (hep)  Advanced Figure 4 piriformis stretch 0l75dqq each  Prone quad stretch, 4k50mfl Bilateral - pillow under pelvis today    NOT PERFORMED   Mirtha pose  Adam stretch, 3j41blo  Supine Green Theraband Shoulder horiz abduction  Prone donkey kicks, 2x10, 3 sec  Seated Tibial internal rotation with Long Arc Quad - NOT PERFORMED     Standing hip glides: lateral, 20x    SEATED:   Seated Physioball: lumbar flexion / Alternate side to side, 10x Bilateral       NOT PERFORMED   T/s extension stretch, 60f5xgn  Hamstring stretch, 9l97otu Bilateral   Chin tucks, 10-26m6wcj  C/S AROM: Flex/Ext / Rotation, 10x ea  Shoulder shrugs, 10x  Trap stretch, 8j53smn  Doorway stretch, W arms, 7i17qdi  McK lumbar extension, 11f4gdj        Manual tx: 0 minutes NOT PERFORMED   +Sidelying STM along bilateral lower lumbar paraspinals  +Sidelying rotational L4-5 mobs  NOT PERFORMED: Myofascial & TP Release to Left Quadratus Lumborum, glute and Iliotibial Band  NOT PERFORMED: Massage roller to Left glute and Iliotibial Band        Therapeutic activities to improve functional performance for 00 minutes including:    Morning mobility routine: lumbar and cervical region Range of Motion in all planes   Posture awareness with standing, turning, sitting to avoid being in excessive anterior or posterior pelvic tilts.   +Reiterated importance to  core stabilization while lifting or carrying to reduce lumbar strain.    STAND:   Red Theraband Bilateral Shoulder External Rotation, 28i1kuw (hep)   Cable Column: Extension / Adduction / Tri Ext, 3#, 20-30x  Cable Column Core: Oblique paloff press 2 x 10 each, 7# / Retro walk x 10, 17# (20 next) / +chest press, 2x10, 7# / +Chops-lift, 10x ea, 3#  Bicep curls, 5#, 20-30x  +Left standing Quadratus Lumborum stretch, 5x10 sec  Piriformis stretch, 7t98xns Bilateral (hep)  Green Theraband shoulder horizontal abduction  Blue tube shoulder External Rotation/Internal Rotation   ADD lateral steps  Standing T/s rotation, 2x5 Bilateral - no spasm but reports left shoulder instability - HOLD        Add NEXT: pt prone to spasms and high sensitivity/fear avoidance  Self massage with Tennis ball  Cable Column   FUTURE:       Patient Education and Home Exercises       Education provided:   - daily HEP  - utilize heat in mornings; ice with flare-up; both 10-20 minute max / ice post tx if sore      Written Home Exercises Provided: Patient instructed to cont prior HEP. Exercises were reviewed and Alea was able to demonstrate them prior to the end of the session.  Alea demonstrated good  understanding of the education provided. See EMR under Patient Instructions for exercises provided during therapy sessions    Assessment     Pt continues to have variable pain levels in her back and weakness in her core, hip and Bilateral Lower Extremities. Focused on spine mobility, hip and thigh flexibility and core strengthening. Pt has not been here for a month and she has not been compliant with HEP; had to make modifications for reduced progress. Continue to progress as tolerated.      Alea WAS progressing well towards her goals. Reduced progression today due to non-compliance and participation.  Pt prognosis is Good.     Pt will continue to benefit from skilled outpatient physical therapy to address the deficits listed in the problem list  box on initial evaluation, provide pt/family education and to maximize pt's level of independence in the home and community environment.     Pt's spiritual, cultural and educational needs considered and pt agreeable to plan of care and goals.     Anticipated barriers to physical therapy: chronicity of condition    Goals:   Short Term GOALS: 3 weeks. Pt agrees with goals set. PROGRESS 1/2/2025  1. Patient demonstrates compliance with HEP.   2. Patient demonstrates independence with Postural Awareness while sitting and standing.   3. Patient demonstrates decreased pain level of 3/10 while performing daily activities.  4. Patient will improve Hamstring and pectoralis flexibility to mild restriction.     Long Term GOALS: 6 weeks. Pt agrees with goals set. PROGRESS 1/2/2025  1. Patient demonstrates increased C/S AROM to improve tolerance to daily activities and turning head while driving. MET 11/12/24  2. Patient demonstrates increased core, hip and BLE strength by 1/2 grade or greater to improve tolerance to home chores.  3. Patient demonstrates increased periscapular muscle and Bilateral Upper Extremities strength by 1/2 grade or more to improve tolerance to work.  4. Patient demonstrates independence with body mechanics while working.  5. Patient demonstrates independence with HEP.   6. Patient will improve FOTO function score by 25% to show improvement in condition and functional mobility.     10 weeks:  Continue to progress and meet previous goals    Plan     Continue PT as deemed per POC: spine mobility, core and trunk strengthening    Plan of care Certification: 111/13/2024 - 2/02/2025     Outpatient Physical Therapy 2 times weekly for 4 weeks to include the following interventions: Cervical/Lumbar Traction, Electrical Stimulation ,, Gait Training, Manual Therapy, Moist Heat/ Ice, Neuromuscular Re-ed, Orthotic Management and Training, Patient Education, Self Care, Therapeutic Activities, Therapeutic Exercise, and  Ultrasound.     Smiley Lima, PT

## 2025-01-03 ENCOUNTER — HOSPITAL ENCOUNTER (OUTPATIENT)
Dept: RADIOLOGY | Facility: HOSPITAL | Age: 35
Discharge: HOME | End: 2025-01-03
Payer: COMMERCIAL

## 2025-01-03 DIAGNOSIS — M48.07 SPINAL STENOSIS, LUMBOSACRAL REGION: ICD-10-CM

## 2025-01-03 PROCEDURE — 72158 MRI LUMBAR SPINE W/O & W/DYE: CPT | Mod: 26,,, | Performed by: RADIOLOGY

## 2025-01-03 PROCEDURE — 25500020 PHARM REV CODE 255

## 2025-01-03 PROCEDURE — A9585 GADOBUTROL INJECTION: HCPCS

## 2025-01-03 PROCEDURE — 72158 MRI LUMBAR SPINE W/O & W/DYE: CPT | Mod: TC

## 2025-01-03 RX ORDER — GADOBUTROL 604.72 MG/ML
INJECTION INTRAVENOUS
Status: COMPLETED
Start: 2025-01-03 | End: 2025-01-03

## 2025-01-03 RX ADMIN — GADOBUTROL 8 ML: 604.72 INJECTION INTRAVENOUS at 07:01

## 2025-01-03 NOTE — PROGRESS NOTES
SUBJECTIVE:      Patient ID: Alea Barrett is a 34 y.o. female.    Chief Complaint: Back Pain (Lower back pain that has not subsided since last visit. Pt says she followed up PT and it has helped for her neck but not for her back. No UTI symptoms.)    History of Present Illness    CHIEF COMPLAINT:  Patient presents for a follow-up visit regarding ongoing back pain, with particular emphasis on persistent lower back discomfort.    HPI:  Patient reports ongoing back pain with improvement in the neck area but persistent issues in the lower back. The pain radiates from the lower back, spreading like a band around the waist, particularly on the left side, and extends to the left buttocks. It is characterized as sharp rather than spasmodic, with recent episodes of severe pain causing difficulty in movement. A recent incident of bending over to  an object resulted in sharp pain causing significant discomfort and respiratory distress.    Patient has been taking Mobic for pain and Robaxin as a muscle relaxer, with the latter providing some relief depending on the pain severity. The effectiveness of these medications has diminished over time. Physical therapy provided minor improvement for the lower back, but the intensity became overwhelming, leading to a break in treatment.    Patient reports a sensation on the left side of the buttocks. Spasms have improved somewhat, but the pain persists. The ability to bend is intact, but severe pain episodes can significantly impair movement.    Patient denies incontinence of bowel and bladder, numbness or tingling down the legs, and constant leg pain.    ROS:  General: -fever, -chills, -fatigue, -weight gain, -weight loss  Eyes: -vision changes, -redness, -discharge  ENT: -ear pain, -nasal congestion, -sore throat  Cardiovascular: -chest pain, -palpitations, -lower extremity edema  Respiratory: -cough, -shortness of breath, -difficulty breathing  Gastrointestinal:  -abdominal pain, -nausea, -vomiting, -diarrhea, -constipation, -blood in stool  Genitourinary: -dysuria, -hematuria, -frequency  Musculoskeletal: -joint pain, -muscle pain, +back pain, -muscle spasms  Skin: -rash, -lesion  Neurological: -headache, -dizziness, -numbness, -tingling  Psychiatric: -anxiety, -depression, -sleep difficulty        Review of Systems   Constitutional:  Negative for fever.   Cardiovascular:  Negative for chest pain.   Gastrointestinal:  Negative for abdominal pain.   Genitourinary:  Negative for dysuria, hematuria and pelvic pain.   Musculoskeletal:  Positive for back pain.   Neurological:  Positive for weakness and headaches. Negative for numbness.       Past Medical History:   Diagnosis Date    Anxiety     Depression     Weight gain 05/22/2023     Current Outpatient Medications   Medication Sig Dispense Refill    ergocalciferol (ERGOCALCIFEROL) 50,000 unit Cap Take 1 capsule (50,000 Units total) by mouth every 7 days. 4 capsule 11    levonorgestreL (MIRENA) 21 mcg/24 hours (8 yrs) 52 mg IUD 1 each by Intrauterine route once.      meloxicam (MOBIC) 7.5 MG tablet Take 1 tablet (7.5 mg total) by mouth daily as needed for Pain. 30 tablet 0    methocarbamoL (ROBAXIN) 500 MG Tab Take 1 tablet (500 mg total) by mouth 4 (four) times daily as needed. 40 tablet 1    polyethylene glycol (MIRALAX) 17 gram PwPk Take 17 g by mouth daily as needed for Constipation. 30 each 1    gabapentin (NEURONTIN) 100 MG capsule Take 1 capsule (100 mg total) by mouth 2 (two) times daily as needed (Lumbar pain). 60 capsule 1     No current facility-administered medications for this visit.     Review of patient's allergies indicates:  No Known Allergies   Past Surgical History:   Procedure Laterality Date    ear tube      bilateral    EYE SURGERY      WRIST SURGERY  04/2019    Carpal Tunnel     Social History     Socioeconomic History    Marital status: Single    Number of children: 0   Tobacco Use    Smoking status:  Never    Smokeless tobacco: Never   Substance and Sexual Activity    Alcohol use: No    Drug use: No    Sexual activity: Yes     Partners: Male     Social Drivers of Health     Financial Resource Strain: Low Risk  (9/21/2024)    Overall Financial Resource Strain (CARDIA)     Difficulty of Paying Living Expenses: Not hard at all   Food Insecurity: No Food Insecurity (9/21/2024)    Hunger Vital Sign     Worried About Running Out of Food in the Last Year: Never true     Ran Out of Food in the Last Year: Never true   Transportation Needs: No Transportation Needs (5/22/2023)    PRAPARE - Transportation     Lack of Transportation (Medical): No     Lack of Transportation (Non-Medical): No   Physical Activity: Insufficiently Active (9/21/2024)    Exercise Vital Sign     Days of Exercise per Week: 1 day     Minutes of Exercise per Session: 30 min   Stress: Stress Concern Present (9/21/2024)    [employer] - Occupational Stress Questionnaire     Feeling of Stress : Very much   Housing Stability: Low Risk  (5/22/2023)    Housing Stability Vital Sign     Unable to Pay for Housing in the Last Year: No     Number of Places Lived in the Last Year: 1     Unstable Housing in the Last Year: No     Family History   Problem Relation Name Age of Onset    Hypertension Mother      Arthritis Mother      Hypertension Father      Arthritis Father      Hyperlipidemia Father      Hypertension Maternal Grandmother      Arthritis Maternal Grandmother      Pacemaker/defibrilator Maternal Grandmother      Hypertension Paternal Grandmother      Diabetes Paternal Grandmother      Arthritis Paternal Grandmother      Hyperlipidemia Paternal Grandmother      Hypertension Maternal Grandfather      Heart disease Maternal Grandfather      Cancer Paternal Grandfather            OBJECTIVE:      Vitals:    01/02/25 1547   BP: 129/82   BP Location: Right arm   Patient Position: Sitting   Pulse: 91   Temp: 98.4 °F (36.9 °C)    TempSrc: Oral   SpO2: 99%   Weight: 80.3 kg (177 lb)     Physical Exam  Vitals and nursing note reviewed.   Constitutional:       General: She is not in acute distress.     Appearance: Normal appearance. She is well-developed. She is obese. She is not ill-appearing.      Comments: BMI 32     HENT:      Head: Normocephalic and atraumatic.      Nose: Nose normal.      Mouth/Throat:      Pharynx: Uvula midline.   Eyes:      General: Lids are normal.      Conjunctiva/sclera: Conjunctivae normal.      Pupils: Pupils are equal, round, and reactive to light.      Right eye: Pupil is round and reactive.      Left eye: Pupil is round and reactive.   Neck:      Thyroid: No thyromegaly.      Vascular: No JVD.      Trachea: Trachea normal.   Cardiovascular:      Rate and Rhythm: Normal rate and regular rhythm.      Pulses: Normal pulses.      Heart sounds: Normal heart sounds.   Pulmonary:      Effort: Pulmonary effort is normal. No tachypnea or respiratory distress.      Breath sounds: Normal breath sounds.   Musculoskeletal:         General: Tenderness present.      Cervical back: Normal range of motion and neck supple.      Lumbar back: Tenderness and bony tenderness present. No swelling, edema, deformity, signs of trauma or lacerations. Positive right straight leg raise test and positive left straight leg raise test.        Back:       Right lower leg: No edema.      Left lower leg: No edema.   Lymphadenopathy:      Cervical: No cervical adenopathy.   Skin:     General: Skin is warm and dry.      Findings: No rash.   Neurological:      Mental Status: She is alert and oriented to person, place, and time.   Psychiatric:         Mood and Affect: Mood normal.         Speech: Speech normal.         Behavior: Behavior normal. Behavior is cooperative.         Thought Content: Thought content normal.            Assessment:       1. Spinal stenosis, lumbosacral region        Plan:       Assessment & Plan    Assessed patient's back  pain, noting improvement in neck but persistent lower back issues  Evaluated for potential nerve involvement due to sharp pain and radiating symptoms  Considered MRI to further investigate lumbar area before specialist appointment  Determined gabapentin trial appropriate for addressing sharp, potentially nerve-related pain    PLAN SUMMARY:  MRI of lumbar spine ordered to evaluate patient's condition  Initiated gabapentin 100mg twice daily for nerve pain, can increase to 3 times daily if needed  Continued Robaxin as needed for muscle spasms  Continued Mobic as needed for pain management  Referred to back and spine specialist (Dr. Garrett or Dr. Ovalle preferred) for further evaluation and possible injections  Follow up with back and spine specialist after MRI completion  Contact office if unable to schedule MRI before specialist appointment    LOWER BACK PAIN:  Ordered MRI of lumbar spine to further evaluate the patient's condition.  Referred the patient to a back and spine specialist, preferably Dr. Sarmiento or Dr. Ovalle at the neck and spine clinic.  Instructed the patient to follow up with the back and spine specialist after MRI completion.  Advised the patient to contact the office if unable to schedule MRI before the specialist appointment.  Continued Mobic as needed for pain management.  Continued Robaxin as needed for muscle spasms.  Continued ibuprofen as needed for inflammation.  Patient reports no improvement in lower back pain and denies incontinence of bowel and bladder.  Patient experiences pain in lower back, radiating out like a band around the waist, with sharp pain when bending over, affecting breathing.  Suspect possible nerve involvement due to sharp pain.  Patient reports numbness on the left side of the buttock.  Suspect possible nerve involvement due to sharp pain and numbness.  Referred the patient to a back and spine specialist for further evaluation and possible injections.  Patient denies  numbness or tingling down the legs.  Patient reports no pain in the leg, but experiences sharp pains occasionally.  Discussed MRI procedure and potential metal-related contraindications.    NERVE PAIN:  Prescribed gabapentin 100mg twice daily for nerve pain.  Explained to the patient that gabapentin is for nerve pain and may cause drowsiness.  Initiated gabapentin 100mg twice daily as needed for sharp pain, with option to increase to 3 times daily if needed.    NECK PAIN:  Patient reports improvement in neck pain.  Noted that the patient has undergone physical therapy, which helped with neck pain.        Spinal stenosis, lumbosacral region  -     MRI Lumbar Spine W WO Contrast; Future; Expected date: 01/02/2025  -     Ambulatory referral/consult to [facility] Sp[facility] Clinic; Future; Expected date: 01/09/2025  -     gabapentin (NEURONTIN) 100 MG capsule; Take 1 capsule (100 mg total) by mouth 2 (two) times daily as needed (Lumbar pain).  Dispense: 60 capsule; Refill: 1        No follow-ups on file.      1/3/2025 RONALDO Yee, SAJAN  This note was generated with the assistance of ambient listening technology. Verbal consent was obtained by the patient and accompanying visitor(s) for the recording of patient appointment to facilitate this note. I attest to having reviewed and edited the generated note for accuracy, though some syntax or spelling errors may persist. Please contact the author of this note for any clarification.

## 2025-01-13 ENCOUNTER — TELEPHONE (OUTPATIENT)
Dept: PAIN MEDICINE | Facility: CLINIC | Age: 35
End: 2025-01-13
Payer: COMMERCIAL

## 2025-01-13 ENCOUNTER — OFFICE VISIT (OUTPATIENT)
Dept: SPINE | Facility: CLINIC | Age: 35
End: 2025-01-13
Payer: COMMERCIAL

## 2025-01-13 VITALS — HEIGHT: 64 IN | BODY MASS INDEX: 30.22 KG/M2 | RESPIRATION RATE: 14 BRPM | WEIGHT: 177 LBS

## 2025-01-13 DIAGNOSIS — G89.29 CHRONIC BILATERAL LOW BACK PAIN, UNSPECIFIED WHETHER SCIATICA PRESENT: Primary | ICD-10-CM

## 2025-01-13 DIAGNOSIS — M48.07 SPINAL STENOSIS, LUMBOSACRAL REGION: ICD-10-CM

## 2025-01-13 DIAGNOSIS — M54.50 CHRONIC BILATERAL LOW BACK PAIN, UNSPECIFIED WHETHER SCIATICA PRESENT: Primary | ICD-10-CM

## 2025-01-13 DIAGNOSIS — M47.816 LUMBAR SPONDYLOSIS: Primary | ICD-10-CM

## 2025-01-13 PROCEDURE — 99999 PR PBB SHADOW E&M-EST. PATIENT-LVL III: CPT | Mod: PBBFAC,,, | Performed by: PHYSICAL MEDICINE & REHABILITATION

## 2025-01-13 PROCEDURE — 1159F MED LIST DOCD IN RCRD: CPT | Mod: CPTII,S$GLB,, | Performed by: PHYSICAL MEDICINE & REHABILITATION

## 2025-01-13 PROCEDURE — 3008F BODY MASS INDEX DOCD: CPT | Mod: CPTII,S$GLB,, | Performed by: PHYSICAL MEDICINE & REHABILITATION

## 2025-01-13 PROCEDURE — 99204 OFFICE O/P NEW MOD 45 MIN: CPT | Mod: S$GLB,,, | Performed by: PHYSICAL MEDICINE & REHABILITATION

## 2025-01-13 PROCEDURE — 1160F RVW MEDS BY RX/DR IN RCRD: CPT | Mod: CPTII,S$GLB,, | Performed by: PHYSICAL MEDICINE & REHABILITATION

## 2025-01-13 NOTE — TELEPHONE ENCOUNTER
----- Message from Cricket Garrett MD sent at 1/13/2025  2:50 PM CST -----  Please schedule for medial branch blocks and subsequent radiofrequency ablation as indicated the L4-5 and L5-S1 facet joints bilaterally

## 2025-01-13 NOTE — PROGRESS NOTES
SUBJECTIVE:    Patient ID: Alea Barrett is a 34 y.o. female.    Chief Complaint: Back Pain    This is a 34-year-old woman who sees Estefania Benson nurse practitioner for her primary care.  Denies any chronic major medical problems.  No cancer history.  Long history of low back pain.  Presents with complaints low back pain at the lumbosacral junction with very infrequent symptoms radiating into the left greater than right posterior legs to the knees.  She has no persistent leg discomfort.  No bowel or bladder dysfunction fever chills sweats or unexpected weight loss.  She completed a course of physical therapy which helped with neck pain she was having at the time but she is not satisfied with the quality of life with regards to low back pain.  Current pain level is 5/10 but at times as high as 7/10 and interferes with quality of life in terms of activities daily living recreation and occupation.  She is a [employer].      I personally reviewed an MRI of the lumbar spine done 01/03/2025 which is summarized below:      FINDINGS:  Morphology: Marrow signal is within normal limits.  Vertebral body heights are preserved.  Small chronic appearing Schmorl's nodes at L5-S1.     Alignment: Sagittal alignment is within normal limits.  Mild broad levocurvature.     Cord: Normal in contour, caliber, and signal intensity.  Conus position is within normal limits.  No abnormal enhancement.     Disc levels:     T12-L1: Within normal limits.     L1-L2: Within normal limits.     L2-L3: Within normal limits.     L3-L4: Mild bilateral facet arthrosis.  Otherwise within normal limits.  The spinal canal and foramina are patent.     L4-L5: Mild degenerative disc height loss and desiccation.  Shallow central disc extrusion with slight caudal migration and associated annular fissure as well as mild bilateral facet arthrosis combining to produce mild narrowing of the spinal canal and encroachment of the subarticular L5 nerve roots  without clear mass effect.  The foramina remain patent.     L5-S1: Moderate to severe degenerative disc height loss and desiccation and shallow broad-based central disc extrusion with slight caudal migration and subtle annular fissure mildly narrowing the spinal canal and encroachment of both subarticular S1 nerve roots without clear mass effect.  No significant foraminal narrowing.  Mild bilateral facet arthrosis.     Other: Small S2 level Tarlov cysts.  Visualized paraspinal soft tissues are otherwise within normal limits.     Impression:     1. Degenerative disc changes at L4-L5 and L5-S1 as described above with associated shallow central broad-based disc extrusions producing mild spinal canal/subarticular zone narrowing without clear mass effect on the traversing nerve roots.  No significant foraminal narrowing or acute process.          Past Medical History:   Diagnosis Date    Anxiety     Depression     Weight gain 05/22/2023     Social History     Socioeconomic History    Marital status: Single    Number of children: 0   Tobacco Use    Smoking status: Never    Smokeless tobacco: Never   Substance and Sexual Activity    Alcohol use: No    Drug use: No    Sexual activity: Yes     Partners: Male     Social Drivers of Health     Financial Resource Strain: Low Risk  (9/21/2024)    Overall Financial Resource Strain (CARDIA)     Difficulty of Paying Living Expenses: Not hard at all   Food Insecurity: No Food Insecurity (9/21/2024)    Hunger Vital Sign     Worried About Running Out of Food in the Last Year: Never true     Ran Out of Food in the Last Year: Never true   Transportation Needs: No Transportation Needs (5/22/2023)    PRAPARE - Transportation     Lack of Transportation (Medical): No     Lack of Transportation (Non-Medical): No   Physical Activity: Insufficiently Active (9/21/2024)    Exercise Vital Sign     Days of Exercise per Week: 1 day     Minutes of Exercise per Session: 30 min   Stress: Stress Concern  "Present (9/21/2024)    [employer] - Occupational Stress Questionnaire     Feeling of Stress : Very much   Housing Stability: Low Risk  (5/22/2023)    Housing Stability Vital Sign     Unable to Pay for Housing in the Last Year: No     Number of Places Lived in the Last Year: 1     Unstable Housing in the Last Year: No     Past Surgical History:   Procedure Laterality Date    ear tube      bilateral    EYE SURGERY      WRIST SURGERY  04/2019    Carpal Tunnel     Family History   Problem Relation Name Age of Onset    Hypertension Mother      Arthritis Mother      Hypertension Father      Arthritis Father      Hyperlipidemia Father      Hypertension Maternal Grandmother      Arthritis Maternal Grandmother      Pacemaker/defibrilator Maternal Grandmother      Hypertension Paternal Grandmother      Diabetes Paternal Grandmother      Arthritis Paternal Grandmother      Hyperlipidemia Paternal Grandmother      Hypertension Maternal Grandfather      Heart disease Maternal Grandfather      Cancer Paternal Grandfather       Vitals:    01/13/25 1428   Resp: 14   Weight: 80.3 kg (177 lb)   Height: 5' 4" (1.626 m)       Review of Systems   Constitutional:  Negative for chills, diaphoresis, fatigue, fever and unexpected weight change.   HENT:  Negative for trouble swallowing.    Eyes:  Negative for visual disturbance.   Respiratory:  Negative for shortness of breath.    Cardiovascular:  Negative for chest pain.   Gastrointestinal:  Negative for abdominal pain, constipation, nausea and vomiting.   Genitourinary:  Negative for difficulty urinating.   Musculoskeletal:  Negative for arthralgias, back pain, gait problem, joint swelling, myalgias, neck pain and neck stiffness.   Neurological:  Negative for dizziness, speech difficulty, weakness, light-headedness, numbness and headaches.          Objective:      Physical Exam  Neurological:      Mental Status: She is alert and oriented to person, place, and " time.      Comments: A healthy-appearing woman in no acute distress  Mild tenderness to palpation lumbar paraspinous musculature at the lumbosacral junction with no palpable masses  Forward flexion of the lumbar spine is to about 60° before she complains of pain at the lumbosacral junction.  Extension at 10° causes mild pain at the lumbosacral junction  She can heel and toe walk normally  Reflexes- +1-+2 reflexes at the following:   C5-Biceps   C6-Brachioradialis   C7-Triceps   L3/4-Patellar   S1-Achilles   Parminder sign is negative bilaterally  Strength testing- 5/5 strength in the following muscle groups:  C5-Elbow flexion  C6-Wrist extension  C7-Elbow extension  C8-Finger flexion  T1-Finger abduction  L2-Hip flexion  L3-Knee extension  L4-Ankle dorsiflexion  L5-Great toe extension  S1-Ankle plantar flexion       Straight leg raise bilaterally causes low back pain but no radiating leg pain.             Assessment:       1. Chronic bilateral low back pain, unspecified whether sciatica present    2. Spinal stenosis, lumbosacral region           Plan:     She has a nonfocal neurological examination and no historical red flags.  I reassured her there are no worrisome findings on her MRI.  I suspect she has low back pain on basis of degenerative disc disease and facet arthropathy.  Has pain with facet loading.  I am recommending medial branch blocks and subsequent radiofrequency ablation as indicated the L4-5 and L5-S1 facet joints bilaterally.  Follow up with me after the procedure.  Activity as tolerated      Chronic bilateral low back pain, unspecified whether sciatica present    Spinal stenosis, lumbosacral region  -     Ambulatory referral/consult to [facility] Spine Clinic

## 2025-01-14 ENCOUNTER — TELEPHONE (OUTPATIENT)
Dept: SPINE | Facility: CLINIC | Age: 35
End: 2025-01-14
Payer: COMMERCIAL

## 2025-02-03 ENCOUNTER — TELEPHONE (OUTPATIENT)
Dept: PAIN MEDICINE | Facility: CLINIC | Age: 35
End: 2025-02-03
Payer: COMMERCIAL

## 2025-02-03 NOTE — TELEPHONE ENCOUNTER
----- Message from Cricket Garrett MD sent at 2/3/2025  2:46 PM CST -----  Please cancel medial branch blocks bilaterally L4-5 and L5-S1 and schedule for interlaminar injection at L5-S1 instead.

## 2025-02-10 ENCOUNTER — HOSPITAL ENCOUNTER (OUTPATIENT)
Facility: HOSPITAL | Age: 35
Discharge: HOME | End: 2025-02-10
Attending: STUDENT IN AN ORGANIZED HEALTH CARE EDUCATION/TRAINING PROGRAM | Admitting: STUDENT IN AN ORGANIZED HEALTH CARE EDUCATION/TRAINING PROGRAM
Payer: COMMERCIAL

## 2025-02-10 DIAGNOSIS — M54.17 LUMBOSACRAL RADICULOPATHY: Primary | ICD-10-CM

## 2025-02-10 DIAGNOSIS — M54.16 LUMBAR RADICULITIS: ICD-10-CM

## 2025-02-10 LAB
B-HCG UR QL: NEGATIVE
CTP QC/QA: YES

## 2025-02-10 PROCEDURE — 62323 NJX INTERLAMINAR LMBR/SAC: CPT | Performed by: STUDENT IN AN ORGANIZED HEALTH CARE EDUCATION/TRAINING PROGRAM

## 2025-02-10 PROCEDURE — 81025 URINE PREGNANCY TEST: CPT | Performed by: STUDENT IN AN ORGANIZED HEALTH CARE EDUCATION/TRAINING PROGRAM

## 2025-02-10 PROCEDURE — A4216 STERILE WATER/SALINE, 10 ML: HCPCS | Performed by: STUDENT IN AN ORGANIZED HEALTH CARE EDUCATION/TRAINING PROGRAM

## 2025-02-10 PROCEDURE — 25000003 PHARM REV CODE 250: Performed by: STUDENT IN AN ORGANIZED HEALTH CARE EDUCATION/TRAINING PROGRAM

## 2025-02-10 PROCEDURE — 63600175 PHARM REV CODE 636 W HCPCS: Performed by: STUDENT IN AN ORGANIZED HEALTH CARE EDUCATION/TRAINING PROGRAM

## 2025-02-10 PROCEDURE — 25500020 PHARM REV CODE 255: Performed by: STUDENT IN AN ORGANIZED HEALTH CARE EDUCATION/TRAINING PROGRAM

## 2025-02-10 PROCEDURE — 62323 NJX INTERLAMINAR LMBR/SAC: CPT | Mod: ,,, | Performed by: STUDENT IN AN ORGANIZED HEALTH CARE EDUCATION/TRAINING PROGRAM

## 2025-02-10 RX ORDER — DEXAMETHASONE SODIUM PHOSPHATE 10 MG/ML
INJECTION INTRAMUSCULAR; INTRAVENOUS
Status: DISCONTINUED | OUTPATIENT
Start: 2025-02-10 | End: 2025-02-10 | Stop reason: HOSPADM

## 2025-02-10 RX ORDER — SODIUM CHLORIDE 9 MG/ML
INJECTION, SOLUTION INTRAMUSCULAR; INTRAVENOUS; SUBCUTANEOUS
Status: DISCONTINUED | OUTPATIENT
Start: 2025-02-10 | End: 2025-02-10 | Stop reason: HOSPADM

## 2025-02-10 RX ORDER — LIDOCAINE HYDROCHLORIDE 10 MG/ML
INJECTION, SOLUTION EPIDURAL; INFILTRATION; INTRACAUDAL; PERINEURAL
Status: DISCONTINUED | OUTPATIENT
Start: 2025-02-10 | End: 2025-02-10 | Stop reason: HOSPADM

## 2025-02-10 RX ORDER — MIDAZOLAM HYDROCHLORIDE 1 MG/ML
INJECTION INTRAMUSCULAR; INTRAVENOUS
Status: DISCONTINUED | OUTPATIENT
Start: 2025-02-10 | End: 2025-02-10 | Stop reason: HOSPADM

## 2025-02-10 RX ORDER — FENTANYL CITRATE 50 UG/ML
INJECTION, SOLUTION INTRAMUSCULAR; INTRAVENOUS
Status: DISCONTINUED | OUTPATIENT
Start: 2025-02-10 | End: 2025-02-10 | Stop reason: HOSPADM

## 2025-02-10 RX ORDER — LIDOCAINE HYDROCHLORIDE 10 MG/ML
1 INJECTION, SOLUTION EPIDURAL; INFILTRATION; INTRACAUDAL; PERINEURAL ONCE
Status: COMPLETED | OUTPATIENT
Start: 2025-02-10 | End: 2025-02-10

## 2025-02-10 RX ORDER — SODIUM CHLORIDE, SODIUM LACTATE, POTASSIUM CHLORIDE, CALCIUM CHLORIDE 600; 310; 30; 20 MG/100ML; MG/100ML; MG/100ML; MG/100ML
INJECTION, SOLUTION INTRAVENOUS CONTINUOUS
Status: DISCONTINUED | OUTPATIENT
Start: 2025-02-10 | End: 2025-02-10 | Stop reason: HOSPADM

## 2025-02-10 RX ADMIN — LIDOCAINE HYDROCHLORIDE 0.2 ML: 10 INJECTION, SOLUTION EPIDURAL; INFILTRATION; INTRACAUDAL at 08:02

## 2025-02-10 NOTE — DISCHARGE SUMMARY
[facility] - Periop Services  Discharge Note  Short Stay    Procedure(s):  LESI      OUTCOME: Patient tolerated treatment/procedure well without complication and is now ready for discharge.    DISPOSITION: Home or Self Care    FINAL DIAGNOSIS:  <principal problem not specified>    FOLLOWUP: In clinic    DISCHARGE INSTRUCTIONS:    Discharge Procedure Orders   Notify your health care provider if you experience any of the following:  temperature >100.4     Notify your health care provider if you experience any of the following:  severe uncontrolled pain     Notify your health care provider if you experience any of the following:  redness, tenderness, or signs of infection (pain, swelling, redness, odor or green/yellow discharge around incision site)     Activity as tolerated        TIME SPENT ON DISCHARGE: 20 minutes

## 2025-02-10 NOTE — LETTER
February 10, 2025         [address] [place] [address] [address] [id number]       Patient: Alea Barrett   YOB: 1990  Date of Visit: 02/10/2025    To Whom It May Concern:    Matias Barrett  was at [facility] on 02/10/2025. She may return to work/school on 02/12/25 with no restrictions. If you have any questions or concerns, or if I can be of further assistance, please do not hesitate to contact me.    Sincerely,    VIDHYA Ang RN

## 2025-02-10 NOTE — OP NOTE
Patient: Alea Barrett                                                    MRN: [id number]  : 1990                                              Date of procedure: 2/10/2025    Pre Procedure Diagnosis: Lumbar, lumbosacral radiculopathy    Post Procedure Diagnosis: Same    Procedure: Lumbar Epidural Steroid Injection Under Fluoroscopy at L5-S1    Attending: Loly Dover MD    Local Anesthetic Injected: Lidocaine 1% 3 ml    Sedation Medications: See RN note    Estimated Blood Loss: None    Complication: None        Procedure:  After informed consent was obtained, patient was taken to the fluoroscopy suite and placed in a prone position.  The skin was prepped and draped in the usual sterile fashion using chlorhexidine. The skin and subcutaneous tissue overlying the Lumbar vertebral level was infiltrated with 3mLs of 1% Lidocaine using a 25 gauge 1.5 inch needle.  The 20-gauge Tuoehy needle was advanced with the aid of fluoroscopy using the water drop loss of resistance technique at the L5-S1 interspinous space using an intralaminer approach.  Proper placement into the epidural space was confirmed by the loss of resistance.  There was no CSF, heme or paresthesias.  After negative aspiration, 0.5mL of contrast was injected.  The contrast confirmed the needle placement by spread delineating the epidural space in multiple views.  Then after negative aspiration, an injectate consisting of 6mLs of 0.5mL 10mg/mL of Dexamethasone, 0.5mL of preservative free lidocaine and 5mL of preservative free normal saline was injected.  Patient tolerated the procedure well and all needles were removed intact.  There were no complications.    Patient was observed in recovery and discharged home under supervision with discharge instructions in stable condition.

## 2025-02-10 NOTE — H&P
CC: back pain    HPI: The patient is a 35 y.o. female with a history of back pain here for ILESI L5-S1. There are no major changes in history and physical from 1/13/25 by Dr. Garrett.    Past Medical History:   Diagnosis Date    Anxiety     Depression     Weight gain 05/22/2023       Past Surgical History:   Procedure Laterality Date    ear tube      bilateral    EYE SURGERY      WRIST SURGERY  04/2019    Carpal Tunnel       Family History   Problem Relation Name Age of Onset    Hypertension Mother      Arthritis Mother      Hypertension Father      Arthritis Father      Hyperlipidemia Father      Hypertension Maternal Grandmother      Arthritis Maternal Grandmother      Pacemaker/defibrilator Maternal Grandmother      Hypertension Paternal Grandmother      Diabetes Paternal Grandmother      Arthritis Paternal Grandmother      Hyperlipidemia Paternal Grandmother      Hypertension Maternal Grandfather      Heart disease Maternal Grandfather      Cancer Paternal Grandfather         Social History     Socioeconomic History    Marital status: Single    Number of children: 0   Tobacco Use    Smoking status: Never    Smokeless tobacco: Never   Substance and Sexual Activity    Alcohol use: No    Drug use: No    Sexual activity: Yes     Partners: Male     Social Drivers of Health     Financial Resource Strain: Low Risk  (9/21/2024)    Overall Financial Resource Strain (CARDIA)     Difficulty of Paying Living Expenses: Not hard at all   Food Insecurity: No Food Insecurity (9/21/2024)    Hunger Vital Sign     Worried About Running Out of Food in the Last Year: Never true     Ran Out of Food in the Last Year: Never true   Transportation Needs: No Transportation Needs (5/22/2023)    PRAPARE - Transportation     Lack of Transportation (Medical): No     Lack of Transportation (Non-Medical): No   Physical Activity: Insufficiently Active (9/21/2024)    Exercise Vital Sign     Days of Exercise per Week: 1 day     Minutes of Exercise  Detail Level: Zone "per Session: 30 min   Stress: Stress Concern Present (9/21/2024)    [employer] - Occupational Stress Questionnaire     Feeling of Stress : Very much   Housing Stability: Low Risk  (5/22/2023)    Housing Stability Vital Sign     Unable to Pay for Housing in the Last Year: No     Number of Places Lived in the Last Year: 1     Unstable Housing in the Last Year: No       Current Facility-Administered Medications   Medication Dose Route Frequency Provider Last Rate Last Admin    lactated ringers infusion   Intravenous Continuous Loly Dover MD        LIDOcaine (PF) 10 mg/ml (1%) injection 10 mg  1 mL Intradermal Once Loly Dover MD           Review of patient's allergies indicates:  No Known Allergies    Vitals:    02/04/25 1055   Weight: 80.3 kg (177 lb 0.5 oz)   Height: 5' 4" (1.626 m)       REVIEW OF SYSTEMS:     GENERAL: No weight loss, malaise or fevers.  HEENT:  No recent changes in vision or hearing  NECK: Negative for lumps, no difficulty with swallowing.  RESPIRATORY: Negative for cough, wheezing or shortness of breath, patient denies any recent URI.  CARDIOVASCULAR: Negative for chest pain, leg swelling or palpitations.  GI: Negative for abdominal discomfort, blood in stools or black stools or change in bowel habits.  MUSCULOSKELETAL: See HPI.  SKIN: Negative for lesions, rash, and itching.  PSYCH: No suicidal or homicidal ideations, no current mood disturbances.  HEMATOLOGY/LYMPHOLOGY: Negative for prolonged bleeding, bruising easily or swollen nodes. Patient is not currently taking any anti-coagulants  ENDO: No history of diabetes or thyroid dysfunction  NEURO: No history of syncope, paralysis, seizures or tremors.All other reviewed and negative other than HPI.    Physical exam:  Gen: A and O x3, pleasant, well-groomed  Skin: No rashes or obvious lesions  HEENT: PERRLA, no obvious deformities on ears or in canals. No thyroid masses, trachea midline, no palpable lymph nodes in " neck, axilla.  CVS: Regular rate and rhythm, normal S1 and S2, no murmurs.  Resp: Clear to auscultation bilaterally.  Abdomen: Soft, NT/ND, normal bowel sounds present.  Musculoskeletal/Neuro: Moving all extremities    Assessment:  Lumbosacral radiculopathy  -     Place in Outpatient; Standing  -     Vital signs; Standing  -     Verify informed consent; Standing  -     Notify physician ; Standing  -     Notify physician ; Standing  -     Notify physician (specify); Standing  -     Diet NPO; Standing    Lumbar radiculitis    Other orders  -     POCT urine pregnancy; Standing  -     LIDOcaine (PF) 10 mg/ml (1%) injection 10 mg  -     lactated ringers infusion  -     IP VTE LOW RISK PATIENT; Standing          PLAN: ILESI L5-S1       This patient has been cleared for surgery in an ambulatory surgical facility    ASA 3,  Mallampatti Score 3  No history of anesthetic complications  Plan for RN IV sedation

## 2025-02-11 VITALS
HEIGHT: 64 IN | BODY MASS INDEX: 30.22 KG/M2 | HEART RATE: 73 BPM | OXYGEN SATURATION: 100 % | SYSTOLIC BLOOD PRESSURE: 129 MMHG | TEMPERATURE: 98 F | WEIGHT: 177 LBS | DIASTOLIC BLOOD PRESSURE: 86 MMHG | RESPIRATION RATE: 18 BRPM

## 2025-03-10 ENCOUNTER — TELEPHONE (OUTPATIENT)
Dept: SPINE | Facility: CLINIC | Age: 35
End: 2025-03-10

## 2025-03-10 ENCOUNTER — OFFICE VISIT (OUTPATIENT)
Dept: SPINE | Facility: CLINIC | Age: 35
End: 2025-03-10
Payer: COMMERCIAL

## 2025-03-10 VITALS — HEIGHT: 64 IN | WEIGHT: 177 LBS | BODY MASS INDEX: 30.22 KG/M2

## 2025-03-10 DIAGNOSIS — G89.29 CHRONIC BILATERAL LOW BACK PAIN, UNSPECIFIED WHETHER SCIATICA PRESENT: Primary | ICD-10-CM

## 2025-03-10 DIAGNOSIS — M54.50 CHRONIC BILATERAL LOW BACK PAIN, UNSPECIFIED WHETHER SCIATICA PRESENT: Primary | ICD-10-CM

## 2025-03-10 DIAGNOSIS — M54.6 PAIN IN THORACIC SPINE: ICD-10-CM

## 2025-03-10 PROCEDURE — 99999 PR PBB SHADOW E&M-EST. PATIENT-LVL III: CPT | Mod: PBBFAC,,, | Performed by: PHYSICAL MEDICINE & REHABILITATION

## 2025-03-10 PROCEDURE — 3008F BODY MASS INDEX DOCD: CPT | Mod: CPTII,S$GLB,, | Performed by: PHYSICAL MEDICINE & REHABILITATION

## 2025-03-10 PROCEDURE — 1159F MED LIST DOCD IN RCRD: CPT | Mod: CPTII,S$GLB,, | Performed by: PHYSICAL MEDICINE & REHABILITATION

## 2025-03-10 PROCEDURE — 99213 OFFICE O/P EST LOW 20 MIN: CPT | Mod: S$GLB,,, | Performed by: PHYSICAL MEDICINE & REHABILITATION

## 2025-03-10 PROCEDURE — 1160F RVW MEDS BY RX/DR IN RCRD: CPT | Mod: CPTII,S$GLB,, | Performed by: PHYSICAL MEDICINE & REHABILITATION

## 2025-03-10 NOTE — PROGRESS NOTES
"  SUBJECTIVE:    Patient ID: Alea Barrett is a 35 y.o. female.    Chief Complaint: Follow-up    She is here for follow-up status post interlaminar injection L5-S1 on 02/10/2025 with Dr. Dover.  No improvement whatsoever from that procedure.  She presents with ongoing complaints of low back pain at the lumbosacral junction.  She is not having any radicular leg symptoms so that is an improvement.  She is also complaining pain in the midthoracic region.  Worse with activity.  No radicular symptoms.  Current pain level is 5/10 but at times as high as 6/10 and interferes with quality of life in terms of activities of daily living recreation and social activities.          Past Medical History:   Diagnosis Date    Anxiety     Depression     Weight gain 05/22/2023     Social History[1]  Past Surgical History:   Procedure Laterality Date    ear tube      bilateral    EYE SURGERY      INJECTION OF ANESTHETIC AGENT AROUND MEDIAL BRANCH NERVES INNERVATING LUMBAR FACET JOINT  2/10/2025    Procedure: LESI;  Surgeon: Loly Dover MD;  Location: [facility] PAIN MANA[facility]EMENT;  Service: Pain Management;;    WRIST SURGERY  04/2019    Carpal Tunnel     Family History   Problem Relation Name Age of Onset    Hypertension Mother      Arthritis Mother      Hypertension Father      Arthritis Father      Hyperlipidemia Father      Hypertension Maternal Grandmother      Arthritis Maternal Grandmother      Pacemaker/defibrilator Maternal Grandmother      Hypertension Paternal Grandmother      Diabetes Paternal Grandmother      Arthritis Paternal Grandmother      Hyperlipidemia Paternal Grandmother      Hypertension Maternal Grandfather      Heart disease Maternal Grandfather      Cancer Paternal Grandfather       Vitals:    03/10/25 1334   Weight: 80.3 kg (177 lb 0.5 oz)   Height: 5' 4" (1.626 m)       Review of Systems   Constitutional:  Negative for chills, diaphoresis, fatigue, fever and unexpected weight change.   HENT:  Negative for " trouble swallowing.    Eyes:  Negative for visual disturbance.   Respiratory:  Negative for shortness of breath.    Cardiovascular:  Negative for chest pain.   Gastrointestinal:  Negative for abdominal pain, constipation, nausea and vomiting.   Genitourinary:  Negative for difficulty urinating.   Musculoskeletal:  Negative for arthralgias, back pain, gait problem, joint swelling, myalgias, neck pain and neck stiffness.   Neurological:  Negative for dizziness, speech difficulty, weakness, light-headedness, numbness and headaches.          Objective:      Physical Exam  Neurological:      Mental Status: She is alert and oriented to person, place, and time.             Assessment:       1. Chronic bilateral low back pain, unspecified whether sciatica present    2. Pain in thoracic spine           Plan:     Minimal improvement status post interlaminar injection at L5-S1.  The majority of her pain I feel is facetogenic therefore as previously recommended I recommend proceeding with medial branch blocks and subsequent radiofrequency ablation as indicated of the L4-5 and L5-S1 facet joints bilaterally.  We will get an MRI of the thoracic spine she may be a candidate for epidural steroid injections there.  Follow up with me after the procedure      Chronic bilateral low back pain, unspecified whether sciatica present    Pain in thoracic spine  -     MRI Thoracic Spine Without Contrast; Future; Expected date: 03/10/2025               [1]   Social History  Socioeconomic History    Marital status: Single    Number of children: 0   Tobacco Use    Smoking status: Never    Smokeless tobacco: Never   Substance and Sexual Activity    Alcohol use: No    Drug use: No    Sexual activity: Yes     Partners: Male     Social Drivers of Health     Financial Resource Strain: Low Risk  (9/21/2024)    Overall Financial Resource Strain (CARDIA)     Difficulty of Paying Living Expenses: Not hard at all   Food Insecurity: No Food Insecurity  (9/21/2024)    Hunger Vital Sign     Worried About Running Out of Food in the Last Year: Never true     Ran Out of Food in the Last Year: Never true   Transportation Needs: No Transportation Needs (5/22/2023)    PRAPARE - Transportation     Lack of Transportation (Medical): No     Lack of Transportation (Non-Medical): No   Physical Activity: Insufficiently Active (9/21/2024)    Exercise Vital Sign     Days of Exercise per Week: 1 day     Minutes of Exercise per Session: 30 min   Stress: Stress Concern Present (9/21/2024)    [employer] - Occupational Stress Questionnaire     Feeling of Stress : Very much   Housing Stability: Low Risk  (5/22/2023)    Housing Stability Vital Sign     Unable to Pay for Housing in the Last Year: No     Number of Places Lived in the Last Year: 1     Unstable Housing in the Last Year: No

## 2025-03-10 NOTE — TELEPHONE ENCOUNTER
----- Message from Cricket Garrett MD sent at 3/10/2025  1:47 PM CDT -----  Please schedule for medial branch blocks and subsequent radiofrequency ablation as indicated of the L4-5 and L5-S1 facet joints bilaterally.  Dr. Dover please

## 2025-03-13 ENCOUNTER — TELEPHONE (OUTPATIENT)
Dept: PAIN MEDICINE | Facility: CLINIC | Age: 35
End: 2025-03-13
Payer: COMMERCIAL

## 2025-03-13 NOTE — TELEPHONE ENCOUNTER
----- Message from Nazario Bradshaw sent at 3/12/2025  8:33 AM CDT -----  Regarding: FW: Missing code    ----- Message -----  From: Yina Stuart LPN  Sent: 3/12/2025   8:26 AM CDT  To: [facility] Staff  Subject: Missing code                                     Good morning, I am missing a code on this auth.  Per the treatment plan there should be two codes. Thank you, Yina Stuart [facility] Pre Service

## 2025-03-15 ENCOUNTER — HOSPITAL ENCOUNTER (OUTPATIENT)
Dept: RADIOLOGY | Facility: HOSPITAL | Age: 35
Discharge: HOME | End: 2025-03-15
Attending: PHYSICAL MEDICINE & REHABILITATION
Payer: COMMERCIAL

## 2025-03-15 DIAGNOSIS — M54.6 PAIN IN THORACIC SPINE: ICD-10-CM

## 2025-03-15 PROCEDURE — 72146 MRI CHEST SPINE W/O DYE: CPT | Mod: 26,,, | Performed by: RADIOLOGY

## 2025-03-15 PROCEDURE — 72146 MRI CHEST SPINE W/O DYE: CPT | Mod: TC

## 2025-03-24 ENCOUNTER — HOSPITAL ENCOUNTER (OUTPATIENT)
Facility: HOSPITAL | Age: 35
Discharge: HOME | End: 2025-03-24
Attending: STUDENT IN AN ORGANIZED HEALTH CARE EDUCATION/TRAINING PROGRAM | Admitting: STUDENT IN AN ORGANIZED HEALTH CARE EDUCATION/TRAINING PROGRAM
Payer: COMMERCIAL

## 2025-03-24 VITALS
HEART RATE: 72 BPM | DIASTOLIC BLOOD PRESSURE: 79 MMHG | OXYGEN SATURATION: 100 % | TEMPERATURE: 98 F | SYSTOLIC BLOOD PRESSURE: 123 MMHG | RESPIRATION RATE: 18 BRPM

## 2025-03-24 DIAGNOSIS — M54.50 CHRONIC BILATERAL LOW BACK PAIN, UNSPECIFIED WHETHER SCIATICA PRESENT: ICD-10-CM

## 2025-03-24 DIAGNOSIS — M47.817 SPONDYLOSIS OF LUMBOSACRAL REGION WITHOUT MYELOPATHY OR RADICULOPATHY: Primary | ICD-10-CM

## 2025-03-24 DIAGNOSIS — M47.896 OTHER SPONDYLOSIS, LUMBAR REGION: ICD-10-CM

## 2025-03-24 DIAGNOSIS — G89.29 CHRONIC BILATERAL LOW BACK PAIN, UNSPECIFIED WHETHER SCIATICA PRESENT: ICD-10-CM

## 2025-03-24 LAB
B-HCG UR QL: NEGATIVE
CTP QC/QA: YES

## 2025-03-24 PROCEDURE — 81025 URINE PREGNANCY TEST: CPT | Performed by: STUDENT IN AN ORGANIZED HEALTH CARE EDUCATION/TRAINING PROGRAM

## 2025-03-24 PROCEDURE — 64493 INJ PARAVERT F JNT L/S 1 LEV: CPT | Mod: 50 | Performed by: STUDENT IN AN ORGANIZED HEALTH CARE EDUCATION/TRAINING PROGRAM

## 2025-03-24 PROCEDURE — 63600175 PHARM REV CODE 636 W HCPCS: Performed by: STUDENT IN AN ORGANIZED HEALTH CARE EDUCATION/TRAINING PROGRAM

## 2025-03-24 PROCEDURE — 64493 INJ PARAVERT F JNT L/S 1 LEV: CPT | Mod: 50,,, | Performed by: STUDENT IN AN ORGANIZED HEALTH CARE EDUCATION/TRAINING PROGRAM

## 2025-03-24 PROCEDURE — 64494 INJ PARAVERT F JNT L/S 2 LEV: CPT | Mod: 50,,, | Performed by: STUDENT IN AN ORGANIZED HEALTH CARE EDUCATION/TRAINING PROGRAM

## 2025-03-24 PROCEDURE — 99152 MOD SED SAME PHYS/QHP 5/>YRS: CPT | Performed by: STUDENT IN AN ORGANIZED HEALTH CARE EDUCATION/TRAINING PROGRAM

## 2025-03-24 PROCEDURE — 64494 INJ PARAVERT F JNT L/S 2 LEV: CPT | Mod: 50 | Performed by: STUDENT IN AN ORGANIZED HEALTH CARE EDUCATION/TRAINING PROGRAM

## 2025-03-24 RX ORDER — LIDOCAINE HYDROCHLORIDE 10 MG/ML
1 INJECTION, SOLUTION EPIDURAL; INFILTRATION; INTRACAUDAL; PERINEURAL ONCE
Status: DISCONTINUED | OUTPATIENT
Start: 2025-03-24 | End: 2025-03-24 | Stop reason: HOSPADM

## 2025-03-24 RX ORDER — BUPIVACAINE HYDROCHLORIDE 2.5 MG/ML
INJECTION, SOLUTION EPIDURAL; INFILTRATION; INTRACAUDAL; PERINEURAL
Status: DISCONTINUED | OUTPATIENT
Start: 2025-03-24 | End: 2025-03-24 | Stop reason: HOSPADM

## 2025-03-24 RX ORDER — LIDOCAINE HYDROCHLORIDE 10 MG/ML
INJECTION, SOLUTION EPIDURAL; INFILTRATION; INTRACAUDAL; PERINEURAL
Status: DISCONTINUED | OUTPATIENT
Start: 2025-03-24 | End: 2025-03-24 | Stop reason: HOSPADM

## 2025-03-24 RX ORDER — MIDAZOLAM HYDROCHLORIDE 1 MG/ML
INJECTION INTRAMUSCULAR; INTRAVENOUS
Status: DISCONTINUED | OUTPATIENT
Start: 2025-03-24 | End: 2025-03-24 | Stop reason: HOSPADM

## 2025-03-24 RX ORDER — SODIUM CHLORIDE, SODIUM LACTATE, POTASSIUM CHLORIDE, CALCIUM CHLORIDE 600; 310; 30; 20 MG/100ML; MG/100ML; MG/100ML; MG/100ML
INJECTION, SOLUTION INTRAVENOUS CONTINUOUS
Status: DISCONTINUED | OUTPATIENT
Start: 2025-03-24 | End: 2025-03-24 | Stop reason: HOSPADM

## 2025-03-24 RX ORDER — NAPROXEN SODIUM 220 MG
220 TABLET ORAL
COMMUNITY

## 2025-03-24 NOTE — OP NOTE
Patient: Alea Barrett                                                    MRN: [id number]  : 1990                                              Date of procedure: 3/24/2025        Pre Procedure Diagnosis: Back pain, Lumbar spondylosis without myelopathy/ lumbosacral region    Post Procedure Diagnosis: Same    Procedure: Bilateral Lumbar Medial Branch Nerve Block for L4-5 and L5-S1 joints under Fluoroscopy     Attending: Loly Dover MD    Local Anesthetic Injected: 1% Lidocaine 10 ml    Anxiolysis Medications: Versed    Estimated Blood Loss: None    Complication: None        Procedure:  After informed consent was obtained, patient was taken to the fluoroscopy suite and placed in a prone position.  The skin was prepped and draped in the usual sterile fashion using chlorhexidine.  Anatomical landmarks were identified with the aid of fluoroscopy in PA and Oblique views, and the skin and subcutaneous tissue were infiltrated with a total of 10mL of 1% Lidocaine via a 25-gauge 1.5inch needle at each of the intended entry sites.  Subsequently, three 22-gauge 3.5inch needles were advanced with the aid of fluoroscopy such that their tips were located at the respective positions of the superior medial border of the transverse processes with the posterior elements at each level.  Needle placement was confirmed with the aid of fluoroscopy.  After negative aspiration, 0.5mL of 0.25% Bupivicaine was injected at each level.  This was repeated on the other side. There were no complications or paresthesias.   Patient tolerated the procedure well and all needles were removed intact.    Patient was observed in recovery and discharged home with written instruction under supervision in stable condition.

## 2025-03-24 NOTE — H&P
CC: back pain    HPI: The patient is a 35 y.o. female with a history of back pain here for B MBB L4-5 and L5-S1. There are no major changes in history and physical from 3/10/25 by Dr. Garrett.    Past Medical History:   Diagnosis Date    Anxiety     Depression     Weight gain 05/22/2023       Past Surgical History:   Procedure Laterality Date    ear tube      bilateral    EYE SURGERY      INJECTION OF ANESTHETIC AGENT AROUND MEDIAL BRANCH NERVES INNERVATING LUMBAR FACET JOINT  2/10/2025    Procedure: LESI;  Surgeon: Loly Dover MD;  Location: [facility];  Service: Pain Management;;    WRIST SURGERY  04/2019    Carpal Tunnel       Family History   Problem Relation Name Age of Onset    Hypertension Mother      Arthritis Mother      Hypertension Father      Arthritis Father      Hyperlipidemia Father      Hypertension Maternal Grandmother      Arthritis Maternal Grandmother      Pacemaker/defibrilator Maternal Grandmother      Hypertension Paternal Grandmother      Diabetes Paternal Grandmother      Arthritis Paternal Grandmother      Hyperlipidemia Paternal Grandmother      Hypertension Maternal Grandfather      Heart disease Maternal Grandfather      Cancer Paternal Grandfather         Social History     Socioeconomic History    Marital status: Single    Number of children: 0   Tobacco Use    Smoking status: Never    Smokeless tobacco: Never   Substance and Sexual Activity    Alcohol use: No    Drug use: No    Sexual activity: Yes     Partners: Male     Social Drivers of Health     Financial Resource Strain: Low Risk  (9/21/2024)    Overall Financial Resource Strain (CARDIA)     Difficulty of Paying Living Expenses: Not hard at all   Food Insecurity: No Food Insecurity (9/21/2024)    Hunger Vital Sign     Worried About Running Out of Food in the Last Year: Never true     Ran Out of Food in the Last Year: Never true   Transportation Needs: No Transportation Needs (5/22/2023)    PRAPARE - Transportation      Lack of Transportation (Medical): No     Lack of Transportation (Non-Medical): No   Physical Activity: Insufficiently Active (9/21/2024)    Exercise Vital Sign     Days of Exercise per Week: 1 day     Minutes of Exercise per Session: 30 min   Stress: Stress Concern Present (9/21/2024)    [facility] [employer] - Occupational Stress Questionnaire     Feeling of Stress : Very much   Housing Stability: Low Risk  (5/22/2023)    Housing Stability Vital Sign     Unable to Pay for Housing in the Last Year: No     Number of Places Lived in the Last Year: 1     Unstable Housing in the Last Year: No       Current Medications[1]    Review of patient's allergies indicates:  No Known Allergies    Vitals:    03/24/25 0844   BP: 119/74   Pulse: 74   Resp: 16   Temp: 97.9 °F (36.6 °C)   TempSrc: Skin   SpO2: 99%       REVIEW OF SYSTEMS:     GENERAL: No weight loss, malaise or fevers.  HEENT:  No recent changes in vision or hearing  NECK: Negative for lumps, no difficulty with swallowing.  RESPIRATORY: Negative for cough, wheezing or shortness of breath, patient denies any recent URI.  CARDIOVASCULAR: Negative for chest pain, leg swelling or palpitations.  GI: Negative for abdominal discomfort, blood in stools or black stools or change in bowel habits.  MUSCULOSKELETAL: See HPI.  SKIN: Negative for lesions, rash, and itching.  PSYCH: No suicidal or homicidal ideations, no current mood disturbances.  HEMATOLOGY/LYMPHOLOGY: Negative for prolonged bleeding, bruising easily or swollen nodes. Patient is not currently taking any anti-coagulants  ENDO: No history of diabetes or thyroid dysfunction  NEURO: No history of syncope, paralysis, seizures or tremors.All other reviewed and negative other than HPI.    Physical exam:  Gen: A and O x3, pleasant, well-groomed  Skin: No rashes or obvious lesions  HEENT: PERRLA, no obvious deformities on ears or in canals. No thyroid masses, trachea midline, no palpable lymph nodes  in neck, axilla.  CVS: Regular rate and rhythm, normal S1 and S2, no murmurs.  Resp: Clear to auscultation bilaterally.  Abdomen: Soft, NT/ND, normal bowel sounds present.  Musculoskeletal/Neuro: Moving all extremities    Assessment:  Spondylosis of lumbosacral region without myelopathy or radiculopathy  -     Place in Outpatient; Standing  -     Vital signs; Standing  -     Verify informed consent; Standing  -     Notify physician ; Standing  -     Notify physician ; Standing  -     Notify physician (specify); Standing  -     Diet NPO; Standing    Other spondylosis, lumbar region    Chronic bilateral low back pain, unspecified whether sciatica present  -     Pain Management; Standing    Other orders  -     POCT urine pregnancy; Standing  -     LIDOcaine (PF) 10 mg/ml (1%) injection 10 mg  -     lactated ringers infusion  -     IP VTE LOW RISK PATIENT; Standing  -     BUPivacaine (PF) 0.25% (2.5 mg/ml) injection  -     LIDOcaine (PF) 10 mg/ml (1%) injection          PLAN: B MBB L4-5 and L5-S1       This patient has been cleared for surgery in an ambulatory surgical facility    ASA 3,  Mallampatti Score 3  No history of anesthetic complications  Plan for RN IV sedation        [1]   Current Facility-Administered Medications   Medication Dose Route Frequency Provider Last Rate Last Admin    BUPivacaine (PF) 0.25% (2.5 mg/ml) injection    PRN Loly Dover MD   10 mL at 03/24/25 0829    lactated ringers infusion   Intravenous Continuous Loly Dover MD        LIDOcaine (PF) 10 mg/ml (1%) injection 10 mg  1 mL Intradermal Once Loly Dover MD        LIDOcaine (PF) 10 mg/ml (1%) injection    PRN Loly Dover MD   10 mL at 03/24/25 0829

## 2025-03-24 NOTE — DISCHARGE SUMMARY
[facility] - Periop Services  Discharge Note  Short Stay    Procedure(s) (LRB):  BLOCK, NERVE, FACET JOINT, LUMBAR, MEDIAL BRANCH L4/5 and L5/S1 (Bilateral)      OUTCOME: Patient tolerated treatment/procedure well without complication and is now ready for discharge.    DISPOSITION: Home or Self Care    FINAL DIAGNOSIS:  <principal problem not specified>    FOLLOWUP: In clinic    DISCHARGE INSTRUCTIONS:    Discharge Procedure Orders   Notify your health care provider if you experience any of the following:  temperature >100.4     Notify your health care provider if you experience any of the following:  severe uncontrolled pain     Notify your health care provider if you experience any of the following:  redness, tenderness, or signs of infection (pain, swelling, redness, odor or green/yellow discharge around incision site)     Activity as tolerated        TIME SPENT ON DISCHARGE: 20 minutes

## 2025-03-24 NOTE — PLAN OF CARE
Reviewed AVS with pt and mother. Verbalized understanding. Denies further questions , needs or complaints. Able to hover BLE above bed for 15 seconds. Ambulates well at bs.To exit via w/c. Home via private vehicle with mother driving and under her care.

## 2025-03-25 ENCOUNTER — TELEPHONE (OUTPATIENT)
Dept: PAIN MEDICINE | Facility: CLINIC | Age: 35
End: 2025-03-25
Payer: COMMERCIAL

## 2025-04-07 ENCOUNTER — OFFICE VISIT (OUTPATIENT)
Dept: SPINE | Facility: CLINIC | Age: 35
End: 2025-04-07
Payer: COMMERCIAL

## 2025-04-07 ENCOUNTER — HOSPITAL ENCOUNTER (OUTPATIENT)
Dept: RADIOLOGY | Facility: HOSPITAL | Age: 35
Discharge: HOME | End: 2025-04-07
Attending: PHYSICAL MEDICINE & REHABILITATION
Payer: COMMERCIAL

## 2025-04-07 ENCOUNTER — PATIENT MESSAGE (OUTPATIENT)
Dept: SPINE | Facility: CLINIC | Age: 35
End: 2025-04-07

## 2025-04-07 VITALS — BODY MASS INDEX: 30.22 KG/M2 | WEIGHT: 177 LBS | HEIGHT: 64 IN

## 2025-04-07 DIAGNOSIS — G89.29 CHRONIC BILATERAL LOW BACK PAIN, UNSPECIFIED WHETHER SCIATICA PRESENT: ICD-10-CM

## 2025-04-07 DIAGNOSIS — M54.6 PAIN IN THORACIC SPINE: ICD-10-CM

## 2025-04-07 DIAGNOSIS — M54.50 CHRONIC BILATERAL LOW BACK PAIN, UNSPECIFIED WHETHER SCIATICA PRESENT: ICD-10-CM

## 2025-04-07 DIAGNOSIS — M54.6 PAIN IN THORACIC SPINE: Primary | ICD-10-CM

## 2025-04-07 PROCEDURE — 72070 X-RAY EXAM THORAC SPINE 2VWS: CPT | Mod: TC

## 2025-04-07 PROCEDURE — 3008F BODY MASS INDEX DOCD: CPT | Mod: CPTII,S$GLB,, | Performed by: PHYSICAL MEDICINE & REHABILITATION

## 2025-04-07 PROCEDURE — 72070 X-RAY EXAM THORAC SPINE 2VWS: CPT | Mod: 26,,, | Performed by: RADIOLOGY

## 2025-04-07 PROCEDURE — 99213 OFFICE O/P EST LOW 20 MIN: CPT | Mod: S$GLB,,, | Performed by: PHYSICAL MEDICINE & REHABILITATION

## 2025-04-07 PROCEDURE — 99999 PR PBB SHADOW E&M-EST. PATIENT-LVL IV: CPT | Mod: PBBFAC,,, | Performed by: PHYSICAL MEDICINE & REHABILITATION

## 2025-04-07 PROCEDURE — 1159F MED LIST DOCD IN RCRD: CPT | Mod: CPTII,S$GLB,, | Performed by: PHYSICAL MEDICINE & REHABILITATION

## 2025-04-07 PROCEDURE — 1160F RVW MEDS BY RX/DR IN RCRD: CPT | Mod: CPTII,S$GLB,, | Performed by: PHYSICAL MEDICINE & REHABILITATION

## 2025-04-07 NOTE — PROGRESS NOTES
SUBJECTIVE:    Patient ID: Alea Barrett is a 35 y.o. female.    Chief Complaint: Follow-up    She is here for follow-up status post medial branch blocks bilaterally L4-5 and L5-S1 on 03/24/2025 with Dr. Dover.  Only about 30% improvement from that procedure so a 2nd block was not performed.  Ongoing complaints of low back pain at the lumbosacral junction.  I note that she also did not respond to interlaminar injections at L5-S1.  Ongoing complaints of midthoracic pain at the bra line.  We took this opportunity to review her thoracic MRI done 03/15/2025.  The study is summarized below:      FINDINGS:  Somewhat motion limited exam.     Morphology: Vertebral body heights are preserved marrow signal is within normal limits.  There are a few scattered small chronic appearing Schmorl's nodes incidentally noted.     Alignment: Within normal limits.     Cord: The thoracic cord is normal in contour, caliber, and signal intensity throughout its length.     Degenerative changes: Mild to moderate degenerative disc height loss with desiccation notable from T6-T7 to T9-T10 with shallow disc bulging also seen slightly asymmetric to the left at T5-T6, asymmetric to the right at T7-T8 and T8-T9 but no lateralizing disc herniation or any significant spinal canal narrowing.  No cord compression.  The foramina are patent throughout.     Other: Limited imaging of the paraspinal soft tissues, chest, and abdomen demonstrates no focal abnormality.     Impression:     1. Motion limited exam demonstrating minor degenerative changes of the discs without evidence of an acute process or any significant spinal canal or foraminal narrowing.  No cord impingement.            Past Medical History:   Diagnosis Date    Anxiety     Depression     Weight gain 05/22/2023     Social History[1]  Past Surgical History:   Procedure Laterality Date    ear tube      bilateral    EYE SURGERY      INJECTION OF ANESTHETIC AGENT AROUND MEDIAL BRANCH  "NERVES INNERVATING LUMBAR FACET JOINT  2/10/2025    Procedure: LESI;  Surgeon: Loly Dover MD;  Location: [facility];  Service: Pain Management;;    INJECTION OF ANESTHETIC AGENT AROUND MEDIAL BRANCH NERVES INNERVATING LUMBAR FACET JOINT Bilateral 3/24/2025    Procedure: BLOCK, NERVE, FACET JOINT, LUMBAR, MEDIAL BRANCH;  Surgeon: Loly Dover MD;  Location: [facility];  Service: Pain Management;  Laterality: Bilateral;    WRIST SURGERY  04/2019    Carpal Tunnel     Family History   Problem Relation Name Age of Onset    Hypertension Mother      Arthritis Mother      Hypertension Father      Arthritis Father      Hyperlipidemia Father      Hypertension Maternal Grandmother      Arthritis Maternal Grandmother      Pacemaker/defibrilator Maternal Grandmother      Hypertension Paternal Grandmother      Diabetes Paternal Grandmother      Arthritis Paternal Grandmother      Hyperlipidemia Paternal Grandmother      Hypertension Maternal Grandfather      Heart disease Maternal Grandfather      Cancer Paternal Grandfather       Vitals:    04/07/25 1032   Weight: 80.3 kg (177 lb 0.5 oz)   Height: 5' 4" (1.626 m)       Review of Systems   Constitutional:  Negative for chills, diaphoresis, fatigue, fever and unexpected weight change.   HENT:  Negative for trouble swallowing.    Eyes:  Negative for visual disturbance.   Respiratory:  Negative for shortness of breath.    Cardiovascular:  Negative for chest pain.   Gastrointestinal:  Negative for abdominal pain, constipation, nausea and vomiting.   Genitourinary:  Negative for difficulty urinating.   Musculoskeletal:  Negative for arthralgias, back pain, gait problem, joint swelling, myalgias, neck pain and neck stiffness.   Neurological:  Negative for dizziness, speech difficulty, weakness, light-headedness, numbness and headaches.          Objective:      Physical Exam  Neurological:      Mental Status: She is alert and oriented to person, place, and " time.             Assessment:       1. Pain in thoracic spine    2. Chronic bilateral low back pain, unspecified whether sciatica present           Plan:     No improvement status post medial branch blocks bilaterally L4-5 and L5-S1.  No improvement status post interlaminar injection L5-S1.  I have no additional recommendations.  I will refer her to pain management and Neurosurgery for 2nd opinions.  We will get some x-rays of the thoracic spine with a marker in preparation for thoracic epidural steroid injections.  She can follow up with me after the procedure.      Pain in thoracic spine  -     X-Ray Thoracic Spine AP Lateral; Future; Expected date: 04/07/2025  -     Ambulatory referral/consult to Pain Clinic; Future; Expected date: 04/14/2025  -     Ambulatory referral/consult to Neurosurgery; Future; Expected date: 04/14/2025    Chronic bilateral low back pain, unspecified whether sciatica present               [1]   Social History  Socioeconomic History    Marital status: Single    Number of children: 0   Tobacco Use    Smoking status: Never    Smokeless tobacco: Never   Substance and Sexual Activity    Alcohol use: No    Drug use: No    Sexual activity: Yes     Partners: Male     Social Drivers of Health     Financial Resource Strain: Low Risk  (9/21/2024)    Overall Financial Resource Strain (CARDIA)     Difficulty of Paying Living Expenses: Not hard at all   Food Insecurity: No Food Insecurity (9/21/2024)    Hunger Vital Sign     Worried About Running Out of Food in the Last Year: Never true     Ran Out of Food in the Last Year: Never true   Transportation Needs: No Transportation Needs (5/22/2023)    PRAPARE - Transportation     Lack of Transportation (Medical): No     Lack of Transportation (Non-Medical): No   Physical Activity: Insufficiently Active (9/21/2024)    Exercise Vital Sign     Days of Exercise per Week: 1 day     Minutes of Exercise per Session: 30 min   Stress: Stress Concern Present  (9/21/2024)    [employer] - Occupational Stress Questionnaire     Feeling of Stress : Very much   Housing Stability: Low Risk  (5/22/2023)    Housing Stability Vital Sign     Unable to Pay for Housing in the Last Year: No     Number of Places Lived in the Last Year: 1     Unstable Housing in the Last Year: No

## 2025-04-09 DIAGNOSIS — M54.14 THORACIC RADICULOPATHY: Primary | ICD-10-CM

## 2025-06-12 ENCOUNTER — TELEPHONE (OUTPATIENT)
Dept: PAIN MEDICINE | Facility: CLINIC | Age: 35
End: 2025-06-12
Payer: COMMERCIAL

## 2025-06-12 NOTE — TELEPHONE ENCOUNTER
Spoke with pt and answered questions about her cancelled auth the new  Drs office just needs to go to into the referral auth approval and change info to their NPI their Dr Their location. Pt verbalized understanding.

## (undated) DEVICE — NDL SAFETY 25G X 1.5 ECLIPSE

## (undated) DEVICE — GLOVE SENSICARE PI ALOE 6

## (undated) DEVICE — SYS LABEL CORRECT MED

## (undated) DEVICE — TOWEL OR DISP STRL BLUE 4/PK